# Patient Record
Sex: FEMALE | Race: WHITE | NOT HISPANIC OR LATINO | Employment: OTHER | URBAN - METROPOLITAN AREA
[De-identification: names, ages, dates, MRNs, and addresses within clinical notes are randomized per-mention and may not be internally consistent; named-entity substitution may affect disease eponyms.]

---

## 2017-08-08 ENCOUNTER — OFFICE VISIT (OUTPATIENT)
Dept: INTERNAL MEDICINE | Facility: CLINIC | Age: 72
End: 2017-08-08

## 2017-08-08 VITALS
HEIGHT: 66 IN | SYSTOLIC BLOOD PRESSURE: 169 MMHG | WEIGHT: 154 LBS | DIASTOLIC BLOOD PRESSURE: 76 MMHG | HEART RATE: 85 BPM | BODY MASS INDEX: 24.75 KG/M2 | OXYGEN SATURATION: 97 % | TEMPERATURE: 98.3 F

## 2017-08-08 DIAGNOSIS — K63.5 MULTIPLE POLYPS OF SIGMOID COLON: ICD-10-CM

## 2017-08-08 DIAGNOSIS — Z00.00 MEDICARE ANNUAL WELLNESS VISIT, SUBSEQUENT: Primary | ICD-10-CM

## 2017-08-08 DIAGNOSIS — I10 ESSENTIAL HYPERTENSION: ICD-10-CM

## 2017-08-08 DIAGNOSIS — Z12.31 VISIT FOR SCREENING MAMMOGRAM: ICD-10-CM

## 2017-08-08 PROCEDURE — 99203 OFFICE O/P NEW LOW 30 MIN: CPT | Performed by: INTERNAL MEDICINE

## 2017-08-08 PROCEDURE — G0439 PPPS, SUBSEQ VISIT: HCPCS | Performed by: INTERNAL MEDICINE

## 2017-08-08 RX ORDER — LATANOPROST 50 UG/ML
SOLUTION/ DROPS OPHTHALMIC
Refills: 6 | COMMUNITY
Start: 2017-07-31

## 2017-08-08 RX ORDER — DORZOLAMIDE HYDROCHLORIDE AND TIMOLOL MALEATE 20; 5 MG/ML; MG/ML
SOLUTION/ DROPS OPHTHALMIC
Refills: 6 | COMMUNITY
Start: 2017-07-31

## 2017-08-08 RX ORDER — LOSARTAN POTASSIUM 100 MG/1
TABLET ORAL
Refills: 2 | COMMUNITY
Start: 2017-06-19 | End: 2017-09-19 | Stop reason: SDUPTHER

## 2017-08-08 NOTE — PROGRESS NOTES
QUICK REFERENCE INFORMATION:  The ABCs of the Annual Wellness Visit    Subsequent Medicare Wellness Visit    HEALTH RISK ASSESSMENT    1945    Recent Hospitalizations:  No hospitalization(s) within the last year..        Current Medical Providers:  Patient Care Team:  Tomy Guido MD as PCP - General (Internal Medicine)        Smoking Status:  History   Smoking Status   • Former Smoker   Smokeless Tobacco   • Not on file       Alcohol Consumption:  History   Alcohol Use   • Yes       Depression Screen:   PHQ-9 Depression Screening 8/8/2017   Little interest or pleasure in doing things 0   Feeling down, depressed, or hopeless 0   PHQ-9 Total Score 0       Health Habits and Functional and Cognitive Screening:  No flowsheet data found.           Does the patient have evidence of cognitive impairment? No    Aspirin use counseling: Start ASA 81 mg daily       Recent Lab Results:  CMP:     Lipid Panel:     HbA1c:       Visual Acuity:  No exam data present    Age-appropriate Screening Schedule:  Refer to the list below for future screening recommendations based on patient's age, sex and/or medical conditions. Orders for these recommended tests are listed in the plan section. The patient has been provided with a written plan.    Health Maintenance   Topic Date Due   • TDAP/TD VACCINES (1 - Tdap) [birth year]/29/1964   • ZOSTER VACCINE  08/08/2017   • INFLUENZA VACCINE  09/01/2017   • MAMMOGRAM  04/13/2018   • PNEUMOCOCCAL VACCINES (65+ LOW/MEDIUM RISK) (2 of 2 - PPSV23) 08/08/2018   • COLONOSCOPY  09/09/2026        Subjective   History of Present Illness    Kamilla Hawthorne is a 71 y.o. female who presents for an Subsequent Wellness Visit.    The following portions of the patient's history were reviewed and updated as appropriate: allergies, current medications, past family history, past medical history, past social history, past surgical history and problem list.    No outpatient prescriptions prior to visit.     No  "facility-administered medications prior to visit.        Patient Active Problem List   Diagnosis   • Medicare annual wellness visit, subsequent   • Essential hypertension   • Multiple polyps of sigmoid colon       Advance Care Planning:  has an advance directive - a copy has been provided and is in file    Identification of Risk Factors:  Risk factors include: none.    Review of Systems    Compared to one year ago, the patient feels her physical health is the same.  Compared to one year ago, the patient feels her mental health is the same.    Objective     Physical Exam    Vitals:    08/08/17 1318   BP: 169/76   BP Location: Left arm   Patient Position: Sitting   Cuff Size: Adult   Pulse: 85   Temp: 98.3 °F (36.8 °C)   TempSrc: Tympanic   SpO2: 97%   Weight: 154 lb (69.9 kg)   Height: 66\" (167.6 cm)       Body mass index is 24.86 kg/(m^2).  Discussed the patient's BMI with her. The BMI is in the acceptable range.    Assessment/Plan   Patient Self-Management and Personalized Health Advice  The patient has been provided with information about: none and preventive services including:   · no new recs.    Visit Diagnoses:    ICD-10-CM ICD-9-CM   1. Medicare annual wellness visit, subsequent Z00.00 V70.0   2. Essential hypertension I10 401.9       No orders of the defined types were placed in this encounter.      Outpatient Encounter Prescriptions as of 8/8/2017   Medication Sig Dispense Refill   • dorzolamide-timolol (COSOPT) 22.3-6.8 MG/ML ophthalmic solution INSTILL 1 DROP BY OPHTHALMIC ROUTE 2 TIMES EVERY DAY INTO BOTH EYES  6   • latanoprost (XALATAN) 0.005 % ophthalmic solution INSTILL 1 DROP BY OPHTHALMIC ROUTE EVERY DAY BOTH EYES AT BEDTIME  6   • losartan (COZAAR) 100 MG tablet TAKE 1 TABLET BY MOUTH DAILY  2     No facility-administered encounter medications on file as of 8/8/2017.        Reviewed use of high risk medication in the elderly: yes  Reviewed for potential of harmful drug interactions in the elderly: " yes    Follow Up:  No Follow-up on file.     An After Visit Summary and PPPS with all of these plans were given to the patient.

## 2017-08-08 NOTE — PATIENT INSTRUCTIONS
Medicare Wellness  Personal Prevention Plan of Service     Date of Office Visit:  2017  Encounter Provider:  Tomy Guido MD  Place of Service:  [facility]  Patient Name: Kamilla Hawthorne  :  1945    As part of the Medicare Wellness portion of your visit today, we are providing you with this personalized preventive plan of services (PPPS). This plan is based upon recommendations of the [place] Preventive Services Task Force (USPSTF) and the Advisory Committee on Immunization Practices (ACIP).    This lists the preventive care services that should be considered, and provides dates of when you are due. Items listed as completed are up-to-date and do not require any further intervention.    Health Maintenance   Topic Date Due   • TDAP/TD VACCINES (1 - Tdap) 1964   • ZOSTER VACCINE  2017   • INFLUENZA VACCINE  2017   • MAMMOGRAM  2018   • PNEUMOCOCCAL VACCINES (65+ LOW/MEDIUM RISK) (2 of 2 - PPSV23) 2018   • COLONOSCOPY  2026   • HEPATITIS C SCREENING  Addressed       No orders of the defined types were placed in this encounter.      Return in about 1 year (around 2018) for Annual physical.

## 2017-08-09 LAB
ALBUMIN SERPL-MCNC: 4.2 G/DL (ref 3.5–5.2)
ALBUMIN/GLOB SERPL: 1.4 G/DL
ALP SERPL-CCNC: 69 U/L (ref 39–117)
ALT SERPL-CCNC: 16 U/L (ref 1–33)
APPEARANCE UR: CLEAR
AST SERPL-CCNC: 20 U/L (ref 1–32)
BACTERIA #/AREA URNS HPF: NORMAL /HPF
BASOPHILS # BLD AUTO: 0.01 10*3/MM3 (ref 0–0.2)
BASOPHILS NFR BLD AUTO: 0.2 % (ref 0–1.5)
BILIRUB SERPL-MCNC: 1.1 MG/DL (ref 0.1–1.2)
BILIRUB UR QL STRIP: NEGATIVE
BUN SERPL-MCNC: 16 MG/DL (ref 8–23)
BUN/CREAT SERPL: 27.6 (ref 7–25)
CALCIUM SERPL-MCNC: 9.8 MG/DL (ref 8.6–10.5)
CASTS URNS MICRO: NORMAL
CHLORIDE SERPL-SCNC: 102 MMOL/L (ref 98–107)
CHOLEST SERPL-MCNC: 247 MG/DL (ref 0–200)
CO2 SERPL-SCNC: 28.3 MMOL/L (ref 22–29)
COLOR UR: YELLOW
CREAT SERPL-MCNC: 0.58 MG/DL (ref 0.57–1)
EOSINOPHIL # BLD AUTO: 0.06 10*3/MM3 (ref 0–0.7)
EOSINOPHIL NFR BLD AUTO: 1.2 % (ref 0.3–6.2)
EPI CELLS #/AREA URNS HPF: NORMAL /HPF
ERYTHROCYTE [DISTWIDTH] IN BLOOD BY AUTOMATED COUNT: 14 % (ref 11.7–13)
GLOBULIN SER CALC-MCNC: 3.1 GM/DL
GLUCOSE SERPL-MCNC: 99 MG/DL (ref 65–99)
GLUCOSE UR QL: NEGATIVE
HCT VFR BLD AUTO: 46.3 % (ref 35.6–45.5)
HDLC SERPL-MCNC: 100 MG/DL (ref 40–60)
HGB BLD-MCNC: 15 G/DL (ref 11.9–15.5)
HGB UR QL STRIP: NEGATIVE
IMM GRANULOCYTES # BLD: 0 10*3/MM3 (ref 0–0.03)
IMM GRANULOCYTES NFR BLD: 0 % (ref 0–0.5)
KETONES UR QL STRIP: NEGATIVE
LDLC SERPL CALC-MCNC: 130 MG/DL (ref 0–100)
LDLC/HDLC SERPL: 1.3 {RATIO}
LEUKOCYTE ESTERASE UR QL STRIP: (no result)
LYMPHOCYTES # BLD AUTO: 2.16 10*3/MM3 (ref 0.9–4.8)
LYMPHOCYTES NFR BLD AUTO: 44.4 % (ref 19.6–45.3)
MCH RBC QN AUTO: 33.3 PG (ref 26.9–32)
MCHC RBC AUTO-ENTMCNC: 32.4 G/DL (ref 32.4–36.3)
MCV RBC AUTO: 102.7 FL (ref 80.5–98.2)
MONOCYTES # BLD AUTO: 0.38 10*3/MM3 (ref 0.2–1.2)
MONOCYTES NFR BLD AUTO: 7.8 % (ref 5–12)
NEUTROPHILS # BLD AUTO: 2.25 10*3/MM3 (ref 1.9–8.1)
NEUTROPHILS NFR BLD AUTO: 46.4 % (ref 42.7–76)
NITRITE UR QL STRIP: NEGATIVE
PH UR STRIP: 7 [PH] (ref 5–8)
PLATELET # BLD AUTO: 216 10*3/MM3 (ref 140–500)
POTASSIUM SERPL-SCNC: 4.5 MMOL/L (ref 3.5–5.2)
PROT SERPL-MCNC: 7.3 G/DL (ref 6–8.5)
PROT UR QL STRIP: NEGATIVE
RBC # BLD AUTO: 4.51 10*6/MM3 (ref 3.9–5.2)
RBC #/AREA URNS HPF: NORMAL /HPF
SODIUM SERPL-SCNC: 142 MMOL/L (ref 136–145)
SP GR UR: 1.01 (ref 1–1.03)
T4 FREE SERPL-MCNC: 1 NG/DL (ref 0.93–1.7)
TRIGL SERPL-MCNC: 84 MG/DL (ref 0–150)
TSH SERPL DL<=0.005 MIU/L-ACNC: 1.53 MIU/ML (ref 0.27–4.2)
UROBILINOGEN UR STRIP-MCNC: (no result) MG/DL
VLDLC SERPL CALC-MCNC: 16.8 MG/DL (ref 5–40)
WBC # BLD AUTO: 4.86 10*3/MM3 (ref 4.5–10.7)
WBC #/AREA URNS HPF: NORMAL /HPF

## 2017-08-20 NOTE — PROGRESS NOTES
Subjective   Kamilla Hawthorne is a 71 y.o. female.   She is here today for Medicare annual visit subsequent along with hypertension multiple sigmoid colon polyps visit for screening mammogram and she has no new complaints  History of Present Illness   She is here today for Medicare annual visit subsequent along with hypertension multiple sigmoid colon polyps and visit for screening mammogram and she has no new complaints  The following portions of the patient's history were reviewed and updated as appropriate: allergies, current medications, past family history, past medical history, past social history, past surgical history and problem list.    Review of Systems   All other systems reviewed and are negative.      Objective   Physical Exam   Constitutional: She is oriented to person, place, and time. Vital signs are normal. She appears well-developed and well-nourished. She is active.   HENT:   Head: Normocephalic and atraumatic.   Right Ear: Hearing, tympanic membrane, external ear and ear canal normal.   Left Ear: Hearing, tympanic membrane, external ear and ear canal normal.   Nose: Nose normal.   Mouth/Throat: Uvula is midline, oropharynx is clear and moist and mucous membranes are normal.   Eyes: Conjunctivae, EOM and lids are normal. Pupils are equal, round, and reactive to light. Right eye exhibits no discharge. Left eye exhibits no discharge.   Neck: Trachea normal, normal range of motion, full passive range of motion without pain and phonation normal. Neck supple. Carotid bruit is not present. No edema present. No thyroid mass and no thyromegaly present.   Cardiovascular: Normal rate, regular rhythm, normal heart sounds, intact distal pulses and normal pulses.  Exam reveals no gallop and no friction rub.    No murmur heard.  Pulmonary/Chest: Effort normal and breath sounds normal. No respiratory distress. She has no wheezes. She has no rales. Right breast exhibits no inverted nipple, no mass, no nipple  discharge, no skin change and no tenderness. Left breast exhibits no inverted nipple, no mass, no nipple discharge, no skin change and no tenderness. Breasts are symmetrical. There is no breast swelling.   Abdominal: Soft. Normal appearance, normal aorta and bowel sounds are normal. She exhibits no distension, no abdominal bruit and no mass. There is no hepatosplenomegaly. There is no tenderness. There is no rebound, no guarding and no CVA tenderness. No hernia. Hernia confirmed negative in the right inguinal area and confirmed negative in the left inguinal area.   Genitourinary: No breast tenderness, discharge or bleeding.   Musculoskeletal: Normal range of motion. She exhibits no edema or tenderness.       Vascular Status -  Her exam exhibits right foot vasculature normal. Her exam exhibits no right foot edema. Her exam exhibits left foot vasculature normal. Her exam exhibits no left foot edema.   Skin Integrity  -  Her right foot skin is intact.     Kamilla 's left foot skin is intact. .  Lymphadenopathy:     She has no cervical adenopathy.     She has no axillary adenopathy.        Right: No inguinal and no supraclavicular adenopathy present.        Left: No inguinal and no supraclavicular adenopathy present.   Neurological: She is alert and oriented to person, place, and time. She has normal strength. No cranial nerve deficit or sensory deficit. She exhibits normal muscle tone. She displays a negative Romberg sign. Coordination normal.   Skin: Skin is warm, dry and intact. No cyanosis. Nails show no clubbing.   Psychiatric: She has a normal mood and affect. Her speech is normal and behavior is normal. Judgment and thought content normal. Cognition and memory are normal.   Nursing note and vitals reviewed.      Assessment/Plan   Diagnoses and all orders for this visit:    Medicare annual wellness visit, subsequent  -     Lipid Panel With LDL / HDL Ratio  -     CBC & Differential  -     Comprehensive Metabolic  Panel  -     T4, Free  -     TSH  -     Urinalysis With Microscopic  -     Mammo Screening Bilateral With CAD; Future    Essential hypertension  -     Lipid Panel With LDL / HDL Ratio  -     CBC & Differential  -     Comprehensive Metabolic Panel  -     T4, Free  -     TSH  -     Urinalysis With Microscopic  -     Mammo Screening Bilateral With CAD; Future    Multiple polyps of sigmoid colon  -     Lipid Panel With LDL / HDL Ratio  -     CBC & Differential  -     Comprehensive Metabolic Panel  -     T4, Free  -     TSH  -     Urinalysis With Microscopic  -     Mammo Screening Bilateral With CAD; Future    Visit for screening mammogram  -     Mammo Screening Bilateral With CAD; Future    Other orders  -     Microscopic Examination      Medicare annual wellness visit subsequent follow recommendation  Hypertension well-controlled on current medication normally  Multiple polyps of sigmoid colon noted  This for screening mammogram schedule

## 2017-08-28 ENCOUNTER — HOSPITAL ENCOUNTER (OUTPATIENT)
Dept: MAMMOGRAPHY | Facility: HOSPITAL | Age: 72
Discharge: HOME | End: 2017-08-28
Attending: INTERNAL MEDICINE | Admitting: INTERNAL MEDICINE

## 2017-08-28 DIAGNOSIS — Z00.00 MEDICARE ANNUAL WELLNESS VISIT, SUBSEQUENT: ICD-10-CM

## 2017-08-28 DIAGNOSIS — Z12.31 VISIT FOR SCREENING MAMMOGRAM: ICD-10-CM

## 2017-08-28 DIAGNOSIS — I10 ESSENTIAL HYPERTENSION: ICD-10-CM

## 2017-08-28 DIAGNOSIS — K63.5 MULTIPLE POLYPS OF SIGMOID COLON: ICD-10-CM

## 2017-08-28 PROCEDURE — G0202 SCR MAMMO BI INCL CAD: HCPCS

## 2017-09-07 ENCOUNTER — TELEPHONE (OUTPATIENT)
Dept: INTERNAL MEDICINE | Facility: CLINIC | Age: 72
End: 2017-09-07

## 2017-09-07 ENCOUNTER — TRANSCRIBE ORDERS (OUTPATIENT)
Dept: ADMINISTRATIVE | Facility: HOSPITAL | Age: 72
End: 2017-09-07

## 2017-09-07 DIAGNOSIS — R92.8 ABNORMAL MAMMOGRAM: Primary | ICD-10-CM

## 2017-09-14 ENCOUNTER — HOSPITAL ENCOUNTER (OUTPATIENT)
Dept: MAMMOGRAPHY | Facility: HOSPITAL | Age: 72
Discharge: HOME | End: 2017-09-14
Attending: INTERNAL MEDICINE | Admitting: INTERNAL MEDICINE

## 2017-09-14 DIAGNOSIS — R92.8 ABNORMAL MAMMOGRAM: ICD-10-CM

## 2017-09-14 PROCEDURE — G0206 DX MAMMO INCL CAD UNI: HCPCS

## 2017-09-19 RX ORDER — LOSARTAN POTASSIUM 100 MG/1
TABLET ORAL
Qty: 90 TABLET | Refills: 2 | Status: SHIPPED | OUTPATIENT
Start: 2017-09-19 | End: 2018-08-08 | Stop reason: SDUPTHER

## 2018-08-08 RX ORDER — LOSARTAN POTASSIUM 100 MG/1
100 TABLET ORAL DAILY
Qty: 90 TABLET | Refills: 2 | Status: SHIPPED | OUTPATIENT
Start: 2018-08-08 | End: 2018-09-14

## 2018-09-14 ENCOUNTER — OFFICE VISIT (OUTPATIENT)
Dept: INTERNAL MEDICINE | Facility: CLINIC | Age: 73
End: 2018-09-14

## 2018-09-14 VITALS
OXYGEN SATURATION: 98 % | BODY MASS INDEX: 23.78 KG/M2 | TEMPERATURE: 98.1 F | DIASTOLIC BLOOD PRESSURE: 89 MMHG | WEIGHT: 148 LBS | HEIGHT: 66 IN | SYSTOLIC BLOOD PRESSURE: 159 MMHG | RESPIRATION RATE: 16 BRPM | HEART RATE: 60 BPM

## 2018-09-14 DIAGNOSIS — I10 ESSENTIAL HYPERTENSION: Primary | ICD-10-CM

## 2018-09-14 PROCEDURE — 90732 PPSV23 VACC 2 YRS+ SUBQ/IM: CPT | Performed by: INTERNAL MEDICINE

## 2018-09-14 PROCEDURE — 99212 OFFICE O/P EST SF 10 MIN: CPT | Performed by: INTERNAL MEDICINE

## 2018-09-14 PROCEDURE — G0009 ADMIN PNEUMOCOCCAL VACCINE: HCPCS | Performed by: INTERNAL MEDICINE

## 2018-09-14 RX ORDER — LOSARTAN POTASSIUM AND HYDROCHLOROTHIAZIDE 25; 100 MG/1; MG/1
1 TABLET ORAL DAILY
Qty: 90 TABLET | Refills: 3 | Status: SHIPPED | OUTPATIENT
Start: 2018-09-14 | End: 2019-05-09

## 2018-09-15 LAB
ALBUMIN SERPL-MCNC: 4.7 G/DL (ref 3.5–5.2)
ALBUMIN/GLOB SERPL: 2 G/DL
ALP SERPL-CCNC: 77 U/L (ref 39–117)
ALT SERPL-CCNC: 15 U/L (ref 1–33)
APPEARANCE UR: CLEAR
AST SERPL-CCNC: 13 U/L (ref 1–32)
BACTERIA #/AREA URNS HPF: NORMAL /HPF
BASOPHILS # BLD AUTO: 0.04 10*3/MM3 (ref 0–0.2)
BASOPHILS NFR BLD AUTO: 0.9 % (ref 0–1.5)
BILIRUB SERPL-MCNC: 0.9 MG/DL (ref 0.1–1.2)
BILIRUB UR QL STRIP: NEGATIVE
BUN SERPL-MCNC: 17 MG/DL (ref 8–23)
BUN/CREAT SERPL: 32.1 (ref 7–25)
CALCIUM SERPL-MCNC: 9.4 MG/DL (ref 8.6–10.5)
CASTS URNS MICRO: NORMAL
CHLORIDE SERPL-SCNC: 106 MMOL/L (ref 98–107)
CHOLEST SERPL-MCNC: 225 MG/DL (ref 0–200)
CO2 SERPL-SCNC: 22.8 MMOL/L (ref 22–29)
COLOR UR: YELLOW
CREAT SERPL-MCNC: 0.53 MG/DL (ref 0.57–1)
EOSINOPHIL # BLD AUTO: 0.07 10*3/MM3 (ref 0–0.7)
EOSINOPHIL NFR BLD AUTO: 1.7 % (ref 0.3–6.2)
EPI CELLS #/AREA URNS HPF: NORMAL /HPF
ERYTHROCYTE [DISTWIDTH] IN BLOOD BY AUTOMATED COUNT: 14.1 % (ref 11.7–13)
GLOBULIN SER CALC-MCNC: 2.3 GM/DL
GLUCOSE SERPL-MCNC: 75 MG/DL (ref 65–99)
GLUCOSE UR QL: NEGATIVE
HCT VFR BLD AUTO: 46.5 % (ref 35.6–45.5)
HDLC SERPL-MCNC: 95 MG/DL (ref 40–60)
HGB BLD-MCNC: 15.1 G/DL (ref 11.9–15.5)
HGB UR QL STRIP: NEGATIVE
IMM GRANULOCYTES # BLD: 0 10*3/MM3 (ref 0–0.03)
IMM GRANULOCYTES NFR BLD: 0 % (ref 0–0.5)
KETONES UR QL STRIP: NEGATIVE
LDLC SERPL CALC-MCNC: 118 MG/DL (ref 0–100)
LDLC/HDLC SERPL: 1.25 {RATIO}
LEUKOCYTE ESTERASE UR QL STRIP: NEGATIVE
LYMPHOCYTES # BLD AUTO: 1.74 10*3/MM3 (ref 0.9–4.8)
LYMPHOCYTES NFR BLD AUTO: 41 % (ref 19.6–45.3)
MCH RBC QN AUTO: 32.3 PG (ref 26.9–32)
MCHC RBC AUTO-ENTMCNC: 32.5 G/DL (ref 32.4–36.3)
MCV RBC AUTO: 99.4 FL (ref 80.5–98.2)
MONOCYTES # BLD AUTO: 0.32 10*3/MM3 (ref 0.2–1.2)
MONOCYTES NFR BLD AUTO: 7.5 % (ref 5–12)
NEUTROPHILS # BLD AUTO: 2.07 10*3/MM3 (ref 1.9–8.1)
NEUTROPHILS NFR BLD AUTO: 48.9 % (ref 42.7–76)
NITRITE UR QL STRIP: NEGATIVE
PH UR STRIP: 6.5 [PH] (ref 5–8)
PLATELET # BLD AUTO: 239 10*3/MM3 (ref 140–500)
POTASSIUM SERPL-SCNC: 4.5 MMOL/L (ref 3.5–5.2)
PROT SERPL-MCNC: 7 G/DL (ref 6–8.5)
PROT UR QL STRIP: NEGATIVE
RBC # BLD AUTO: 4.68 10*6/MM3 (ref 3.9–5.2)
RBC #/AREA URNS HPF: NORMAL /HPF
SODIUM SERPL-SCNC: 145 MMOL/L (ref 136–145)
SP GR UR: 1.02 (ref 1–1.03)
T4 FREE SERPL-MCNC: 1.11 NG/DL (ref 0.93–1.7)
TRIGL SERPL-MCNC: 58 MG/DL (ref 0–150)
TSH SERPL DL<=0.005 MIU/L-ACNC: 1.28 MIU/ML (ref 0.27–4.2)
UROBILINOGEN UR STRIP-MCNC: (no result) MG/DL
VLDLC SERPL CALC-MCNC: 11.6 MG/DL (ref 5–40)
WBC # BLD AUTO: 4.24 10*3/MM3 (ref 4.5–10.7)
WBC #/AREA URNS HPF: NORMAL /HPF

## 2018-10-02 NOTE — PROGRESS NOTES
Subjective   Kamilla Hawthorne is a 72 y.o. female.   She is here today for hypertension and she has no complaints  History of Present Illness   She is here today for hypertension which has been fairly stable but we will make changes to medication today  The following portions of the patient's history were reviewed and updated as appropriate: allergies, current medications, past family history, past medical history, past social history, past surgical history and problem list.    Review of Systems   All other systems reviewed and are negative.      Objective   Physical Exam   Constitutional: She is oriented to person, place, and time. She appears well-developed and well-nourished. She is cooperative.   HENT:   Head: Normocephalic and atraumatic.   Right Ear: Hearing, tympanic membrane, external ear and ear canal normal.   Left Ear: Hearing, tympanic membrane, external ear and ear canal normal.   Nose: Nose normal.   Mouth/Throat: Uvula is midline, oropharynx is clear and moist and mucous membranes are normal.   Eyes: Pupils are equal, round, and reactive to light. Conjunctivae, EOM and lids are normal.   Neck: Phonation normal. Neck supple. Carotid bruit is not present.   Cardiovascular: Normal rate, regular rhythm and normal heart sounds.  Exam reveals no gallop and no friction rub.    No murmur heard.  Pulmonary/Chest: Effort normal and breath sounds normal. No respiratory distress.   Abdominal: Soft. Bowel sounds are normal. She exhibits no distension and no mass. There is no hepatosplenomegaly. There is no tenderness. There is no rebound and no guarding. No hernia.   Musculoskeletal: She exhibits no edema.   Neurological: She is alert and oriented to person, place, and time. Coordination and gait normal.   Skin: Skin is warm and dry.   Psychiatric: She has a normal mood and affect. Her speech is normal and behavior is normal. Judgment and thought content normal.   Nursing note and vitals  reviewed.      Assessment/Plan   Diagnoses and all orders for this visit:    Essential hypertension  -     Lipid Panel With LDL / HDL Ratio  -     CBC & Differential  -     Comprehensive Metabolic Panel  -     T4, Free  -     TSH  -     Urinalysis With Microscopic - Urine, Clean Catch    Other orders  -     losartan-hydrochlorothiazide (HYZAAR) 100-25 MG per tablet; Take 1 tablet by mouth Daily.  -     Pneumococcal Polysaccharide Vaccine 23-Valent Greater Than or Equal To 1yo Subcutaneous / IM  -     Microscopic Examination      Hypertension well-controlled on current medication we will check labs based on that as well

## 2018-10-28 ENCOUNTER — PREP FOR SURGERY (OUTPATIENT)
Dept: OTHER | Facility: HOSPITAL | Age: 73
End: 2018-10-28

## 2018-10-28 DIAGNOSIS — K63.5 COLON POLYP: Primary | ICD-10-CM

## 2018-10-28 DIAGNOSIS — Z80.0 FH: COLON CANCER: ICD-10-CM

## 2018-11-09 PROBLEM — Z80.0 FH: COLON CANCER: Status: ACTIVE | Noted: 2018-11-09

## 2018-11-09 PROBLEM — K63.5 COLON POLYP: Status: ACTIVE | Noted: 2018-11-09

## 2018-12-29 ENCOUNTER — OFFICE VISIT (OUTPATIENT)
Dept: RETAIL CLINIC | Facility: CLINIC | Age: 73
End: 2018-12-29

## 2018-12-29 VITALS
TEMPERATURE: 98.5 F | DIASTOLIC BLOOD PRESSURE: 82 MMHG | SYSTOLIC BLOOD PRESSURE: 158 MMHG | HEART RATE: 65 BPM | RESPIRATION RATE: 16 BRPM | OXYGEN SATURATION: 98 %

## 2018-12-29 DIAGNOSIS — H10.022 PINK EYE DISEASE OF LEFT EYE: ICD-10-CM

## 2018-12-29 DIAGNOSIS — H65.01 RIGHT ACUTE SEROUS OTITIS MEDIA, RECURRENCE NOT SPECIFIED: Primary | ICD-10-CM

## 2018-12-29 PROCEDURE — 99213 OFFICE O/P EST LOW 20 MIN: CPT | Performed by: NURSE PRACTITIONER

## 2018-12-29 RX ORDER — CEFDINIR 300 MG/1
300 CAPSULE ORAL 2 TIMES DAILY
Qty: 20 CAPSULE | Refills: 0 | Status: SHIPPED | OUTPATIENT
Start: 2018-12-29 | End: 2019-01-08

## 2018-12-29 NOTE — PROGRESS NOTES
"[facility]    CC:   Chief Complaint   Patient presents with   • URI     HPI   [place] then cold symptoms since then. Now, bilateral ear pain with ears stopped up, cannot hear  50 years ago ear tubes  Taking coridin hbp  Left eye x 1 day with redness, used 's eye drops (recent infection), used grandson's eye drops  No fever/chills  Occasional sore throat  \"Head going to explode\"    Review of Systems: Please see the above history of present illness for pertinent positives and negatives. The remainder of the patient's systems have been reviewed and are negative.     Past Medical History:   Diagnosis Date   • Colon polyp    • Glaucoma    • Hypertension        History reviewed. No pertinent surgical history.    Social History     Tobacco Use   • Smoking status: Former Smoker     Types: Cigarettes   • Smokeless tobacco: Never Used   Substance Use Topics   • Alcohol use: Yes   • Drug use: Not on file     Current Outpatient Medications:   •  dorzolamide-timolol (COSOPT) 22.3-6.8 MG/ML ophthalmic solution, INSTILL 1 DROP BY OPHTHALMIC ROUTE 2 TIMES EVERY DAY INTO BOTH EYES, Disp: , Rfl: 6  •  latanoprost (XALATAN) 0.005 % ophthalmic solution, INSTILL 1 DROP BY OPHTHALMIC ROUTE EVERY DAY BOTH EYES AT BEDTIME, Disp: , Rfl: 6  •  losartan-hydrochlorothiazide (HYZAAR) 100-25 MG per tablet, Take 1 tablet by mouth Daily., Disp: 90 tablet, Rfl: 3    Allergies   Allergen Reactions   • Penicillins      OBJECTIVE:    /82   Pulse 65   Temp 98.5 °F (36.9 °C) (Oral)   Resp 16   LMP  (LMP Unknown)   SpO2 98%    Right eye 20/40  Left eye 20/40  Both eyes 20/30    Lab Results (last 24 hours)     ** No results found for the last 24 hours. **          General Appearance:    Alert, cooperative, no distress, appears stated age   Head:    Normocephalic, without obvious abnormality, atraumatic   Eyes:    PERRL, left conjunctival erythema with purulent drainage/corneas clear, EOM's intact, fundi     " benign, both eyes   Ears:    Right TM bulging erythematous/edematous with possible perforation noted, fluid noted in canal.    Nose:   Nares normal, septum midline, mucosa normal, no drainage     or sinus tenderness   Throat:   Lips, mucosa, and tongue normal; teeth and gums normal  Tonsils without erythema or exudates.   Neck:   Supple, symmetrical, trachea midline, no adenopathy;            Lungs:     Clear to auscultation bilaterally, respirations unlabored   Chest Wall:    No tenderness or deformity    Heart:    Regular rate and rhythm, S1 and S2 normal, no murmur, rub    or gallop                                       ASSESSMENT/PLAN    1. Right acute serous otitis media, recurrence not specified    - cefdinir (OMNICEF) 300 MG capsule; Take 1 capsule by mouth 2 (Two) Times a Day for 10 days.  Dispense: 20 capsule; Refill: 0    2. Pink eye disease of left eye    - tobramycin in sterile water (preservative free) injection-balanced salts ophthalmic solution; Apply 1-2 drops to eye(s) as directed by provider Every 4 (Four) Hours for 7 days.  Dispense: 5 mL; Refill: 0    Kamilla Hawthorne had no medications administered during this visit.      AVS and information sheet given to patient, discussed in detail, questions answered

## 2019-01-03 ENCOUNTER — OFFICE VISIT (OUTPATIENT)
Dept: INTERNAL MEDICINE | Facility: CLINIC | Age: 74
End: 2019-01-03

## 2019-01-03 VITALS
DIASTOLIC BLOOD PRESSURE: 76 MMHG | OXYGEN SATURATION: 98 % | HEIGHT: 66 IN | HEART RATE: 74 BPM | TEMPERATURE: 97.9 F | SYSTOLIC BLOOD PRESSURE: 156 MMHG | WEIGHT: 151 LBS | RESPIRATION RATE: 17 BRPM | BODY MASS INDEX: 24.27 KG/M2

## 2019-01-03 DIAGNOSIS — H10.022 PINK EYE, LEFT: ICD-10-CM

## 2019-01-03 DIAGNOSIS — R05.9 COUGH: ICD-10-CM

## 2019-01-03 DIAGNOSIS — I10 ESSENTIAL HYPERTENSION: ICD-10-CM

## 2019-01-03 DIAGNOSIS — H92.01 RIGHT EAR PAIN: ICD-10-CM

## 2019-01-03 DIAGNOSIS — J06.9 UPPER RESPIRATORY TRACT INFECTION, UNSPECIFIED TYPE: Primary | ICD-10-CM

## 2019-01-03 DIAGNOSIS — H65.111 SUBACUTE ALLERGIC OTITIS MEDIA OF RIGHT EAR, RECURRENCE NOT SPECIFIED: ICD-10-CM

## 2019-01-03 PROBLEM — H66.90 SUBACUTE OTITIS MEDIA: Status: ACTIVE | Noted: 2019-01-03

## 2019-01-03 PROCEDURE — 96373 THER/PROPH/DIAG INJ IA: CPT | Performed by: INTERNAL MEDICINE

## 2019-01-03 PROCEDURE — 99214 OFFICE O/P EST MOD 30 MIN: CPT | Performed by: INTERNAL MEDICINE

## 2019-01-03 RX ORDER — TRIAMCINOLONE ACETONIDE 40 MG/ML
40 INJECTION, SUSPENSION INTRA-ARTICULAR; INTRAMUSCULAR ONCE
Status: COMPLETED | OUTPATIENT
Start: 2019-01-03 | End: 2019-01-03

## 2019-01-03 RX ORDER — METHYLPREDNISOLONE ACETATE 80 MG/ML
80 INJECTION, SUSPENSION INTRA-ARTICULAR; INTRALESIONAL; INTRAMUSCULAR; SOFT TISSUE ONCE
Status: COMPLETED | OUTPATIENT
Start: 2019-01-03 | End: 2019-01-03

## 2019-01-03 RX ORDER — DOXYCYCLINE HYCLATE 100 MG
100 TABLET ORAL 2 TIMES DAILY
Qty: 20 TABLET | Refills: 0 | Status: SHIPPED | OUTPATIENT
Start: 2019-01-03 | End: 2019-11-25

## 2019-01-03 RX ORDER — TOBRAMYCIN 3 MG/ML
SOLUTION/ DROPS OPHTHALMIC
Refills: 8 | COMMUNITY
Start: 2018-12-30 | End: 2019-11-25

## 2019-01-03 RX ADMIN — METHYLPREDNISOLONE ACETATE 80 MG: 80 INJECTION, SUSPENSION INTRA-ARTICULAR; INTRALESIONAL; INTRAMUSCULAR; SOFT TISSUE at 09:47

## 2019-01-03 RX ADMIN — TRIAMCINOLONE ACETONIDE 40 MG: 40 INJECTION, SUSPENSION INTRA-ARTICULAR; INTRAMUSCULAR at 09:48

## 2019-01-20 NOTE — PROGRESS NOTES
Subjective   Kamilla Hawthorne is a 73 y.o. female.   She is here today for upper respiratory tract infection along with right ear pain hypertension pink eye on The left cough and subacute allergic otitis media of the right ear  History of Present Illness   She is here today for upper respiratory tract infection which is not stable and getting worse along with pinkeye on the left cough which is getting worse chronic allergic otitis media of the right ear which is getting worse hypertension which is stable on current medication  The following portions of the patient's history were reviewed and updated as appropriate: allergies, current medications, past family history, past medical history, past social history, past surgical history and problem list.    Review of Systems   Constitutional: Negative for fatigue.   HENT: Positive for congestion and ear pain.    Eyes: Positive for discharge (left eye).   Respiratory: Positive for cough. Negative for shortness of breath.    Cardiovascular: Negative for chest pain.   Genitourinary: Negative for difficulty urinating.   Neurological: Negative for weakness.   All other systems reviewed and are negative.      Objective   Physical Exam   Constitutional: She is oriented to person, place, and time. She appears well-developed and well-nourished. She is cooperative.   HENT:   Head: Normocephalic and atraumatic.   Right Ear: Hearing, external ear and ear canal normal. Tympanic membrane is erythematous.   Left Ear: Hearing, tympanic membrane, external ear and ear canal normal.   Nose: Nose normal.   Mouth/Throat: Uvula is midline, oropharynx is clear and moist and mucous membranes are normal.   Eyes: Conjunctivae, EOM and lids are normal. Pupils are equal, round, and reactive to light. Left eye exhibits discharge.   Neck: Phonation normal. Neck supple. Carotid bruit is not present.   Cardiovascular: Normal rate, regular rhythm and normal heart sounds. Exam reveals no gallop and no  friction rub.   No murmur heard.  Pulmonary/Chest: Effort normal and breath sounds normal. No respiratory distress.   Abdominal: Soft. Bowel sounds are normal. She exhibits no distension and no mass. There is no hepatosplenomegaly. There is no tenderness. There is no rebound and no guarding. No hernia.   Musculoskeletal: She exhibits no edema.   Neurological: She is alert and oriented to person, place, and time. Coordination and gait normal.   Skin: Skin is warm and dry.   Psychiatric: She has a normal mood and affect. Her speech is normal and behavior is normal. Judgment and thought content normal.   Nursing note and vitals reviewed.      Assessment/Plan   Diagnoses and all orders for this visit:    Upper respiratory tract infection, unspecified type  -     methylPREDNISolone acetate (DEPO-medrol) injection 80 mg; Inject 1 mL into the appropriate muscle as directed by prescriber 1 (One) Time.  -     triamcinolone acetonide (KENALOG-40) injection 40 mg; Inject 1 mL into the appropriate muscle as directed by prescriber 1 (One) Time.    Right ear pain    Essential hypertension    Pink eye, left    Cough  -     methylPREDNISolone acetate (DEPO-medrol) injection 80 mg; Inject 1 mL into the appropriate muscle as directed by prescriber 1 (One) Time.  -     triamcinolone acetonide (KENALOG-40) injection 40 mg; Inject 1 mL into the appropriate muscle as directed by prescriber 1 (One) Time.    Subacute allergic otitis media of right ear, recurrence not specified    Other orders  -     doxycycline (VIBRAMYICN) 100 MG tablet; Take 1 tablet by mouth 2 (Two) Times a Day.      Upper respiratory tract infection not stable and we will get steroid Dosepaks injections  Right ear pain we will treat with doxycycline  Hypertension well-controlled on current medication  Cough not stable and we will provide medication for this  Subacute allergic otitis of the right ear provide doxycycline for that  Pinkeye, left we will utilize  doxycycline

## 2019-02-04 ENCOUNTER — ANESTHESIA (OUTPATIENT)
Dept: GASTROENTEROLOGY | Facility: HOSPITAL | Age: 74
End: 2019-02-04

## 2019-02-04 ENCOUNTER — HOSPITAL ENCOUNTER (OUTPATIENT)
Facility: HOSPITAL | Age: 74
Setting detail: HOSPITAL OUTPATIENT SURGERY
Discharge: HOME | End: 2019-02-04
Attending: INTERNAL MEDICINE | Admitting: INTERNAL MEDICINE

## 2019-02-04 ENCOUNTER — ANESTHESIA EVENT (OUTPATIENT)
Dept: GASTROENTEROLOGY | Facility: HOSPITAL | Age: 74
End: 2019-02-04

## 2019-02-04 VITALS
DIASTOLIC BLOOD PRESSURE: 66 MMHG | SYSTOLIC BLOOD PRESSURE: 126 MMHG | OXYGEN SATURATION: 100 % | BODY MASS INDEX: 23.63 KG/M2 | HEIGHT: 66 IN | TEMPERATURE: 97.7 F | HEART RATE: 81 BPM | RESPIRATION RATE: 16 BRPM | WEIGHT: 147 LBS

## 2019-02-04 DIAGNOSIS — Z80.0 FH: COLON CANCER: ICD-10-CM

## 2019-02-04 DIAGNOSIS — K63.5 COLON POLYP: ICD-10-CM

## 2019-02-04 PROCEDURE — 45380 COLONOSCOPY AND BIOPSY: CPT | Performed by: INTERNAL MEDICINE

## 2019-02-04 PROCEDURE — 88305 TISSUE EXAM BY PATHOLOGIST: CPT | Performed by: INTERNAL MEDICINE

## 2019-02-04 PROCEDURE — 25010000002 PROPOFOL 10 MG/ML EMULSION: Performed by: ANESTHESIOLOGY

## 2019-02-04 RX ORDER — SODIUM CHLORIDE 0.9 % (FLUSH) 0.9 %
3 SYRINGE (ML) INJECTION AS NEEDED
Status: DISCONTINUED | OUTPATIENT
Start: 2019-02-04 | End: 2019-02-04 | Stop reason: HOSPADM

## 2019-02-04 RX ORDER — FLUMAZENIL 0.1 MG/ML
0.2 INJECTION INTRAVENOUS AS NEEDED
Status: DISCONTINUED | OUTPATIENT
Start: 2019-02-04 | End: 2019-02-04 | Stop reason: HOSPADM

## 2019-02-04 RX ORDER — LIDOCAINE HYDROCHLORIDE 20 MG/ML
INJECTION, SOLUTION INFILTRATION; PERINEURAL AS NEEDED
Status: DISCONTINUED | OUTPATIENT
Start: 2019-02-04 | End: 2019-02-04 | Stop reason: SURG

## 2019-02-04 RX ORDER — MELATONIN
1000 DAILY
COMMUNITY

## 2019-02-04 RX ORDER — LIDOCAINE HYDROCHLORIDE 10 MG/ML
0.5 INJECTION, SOLUTION INFILTRATION; PERINEURAL ONCE AS NEEDED
Status: DISCONTINUED | OUTPATIENT
Start: 2019-02-04 | End: 2019-02-04 | Stop reason: HOSPADM

## 2019-02-04 RX ORDER — PROMETHAZINE HYDROCHLORIDE 25 MG/1
25 SUPPOSITORY RECTAL ONCE AS NEEDED
Status: DISCONTINUED | OUTPATIENT
Start: 2019-02-04 | End: 2019-02-04 | Stop reason: HOSPADM

## 2019-02-04 RX ORDER — DIPHENHYDRAMINE HCL 25 MG
25 CAPSULE ORAL EVERY 6 HOURS PRN
Status: DISCONTINUED | OUTPATIENT
Start: 2019-02-04 | End: 2019-02-04 | Stop reason: HOSPADM

## 2019-02-04 RX ORDER — EPHEDRINE SULFATE 50 MG/ML
5 INJECTION, SOLUTION INTRAVENOUS ONCE AS NEEDED
Status: DISCONTINUED | OUTPATIENT
Start: 2019-02-04 | End: 2019-02-04 | Stop reason: HOSPADM

## 2019-02-04 RX ORDER — DIPHENHYDRAMINE HCL 25 MG
25 CAPSULE ORAL
Status: DISCONTINUED | OUTPATIENT
Start: 2019-02-04 | End: 2019-02-04 | Stop reason: HOSPADM

## 2019-02-04 RX ORDER — FENTANYL CITRATE 50 UG/ML
50 INJECTION, SOLUTION INTRAMUSCULAR; INTRAVENOUS
Status: DISCONTINUED | OUTPATIENT
Start: 2019-02-04 | End: 2019-02-04 | Stop reason: HOSPADM

## 2019-02-04 RX ORDER — PROMETHAZINE HYDROCHLORIDE 25 MG/ML
12.5 INJECTION, SOLUTION INTRAMUSCULAR; INTRAVENOUS ONCE AS NEEDED
Status: DISCONTINUED | OUTPATIENT
Start: 2019-02-04 | End: 2019-02-04 | Stop reason: HOSPADM

## 2019-02-04 RX ORDER — ACETAMINOPHEN 650 MG/1
650 SUPPOSITORY RECTAL ONCE AS NEEDED
Status: DISCONTINUED | OUTPATIENT
Start: 2019-02-04 | End: 2019-02-04 | Stop reason: HOSPADM

## 2019-02-04 RX ORDER — PROMETHAZINE HYDROCHLORIDE 25 MG/1
25 TABLET ORAL ONCE AS NEEDED
Status: DISCONTINUED | OUTPATIENT
Start: 2019-02-04 | End: 2019-02-04 | Stop reason: HOSPADM

## 2019-02-04 RX ORDER — NALOXONE HCL 0.4 MG/ML
0.2 VIAL (ML) INJECTION AS NEEDED
Status: DISCONTINUED | OUTPATIENT
Start: 2019-02-04 | End: 2019-02-04 | Stop reason: HOSPADM

## 2019-02-04 RX ORDER — HYDROCODONE BITARTRATE AND ACETAMINOPHEN 7.5; 325 MG/1; MG/1
1 TABLET ORAL ONCE AS NEEDED
Status: DISCONTINUED | OUTPATIENT
Start: 2019-02-04 | End: 2019-02-04 | Stop reason: HOSPADM

## 2019-02-04 RX ORDER — SODIUM CHLORIDE, SODIUM LACTATE, POTASSIUM CHLORIDE, CALCIUM CHLORIDE 600; 310; 30; 20 MG/100ML; MG/100ML; MG/100ML; MG/100ML
1000 INJECTION, SOLUTION INTRAVENOUS CONTINUOUS
Status: DISCONTINUED | OUTPATIENT
Start: 2019-02-04 | End: 2019-02-04 | Stop reason: HOSPADM

## 2019-02-04 RX ORDER — ONDANSETRON 2 MG/ML
4 INJECTION INTRAMUSCULAR; INTRAVENOUS ONCE AS NEEDED
Status: DISCONTINUED | OUTPATIENT
Start: 2019-02-04 | End: 2019-02-04 | Stop reason: HOSPADM

## 2019-02-04 RX ORDER — ASCORBIC ACID 500 MG
500 TABLET ORAL DAILY
COMMUNITY

## 2019-02-04 RX ORDER — HYDROMORPHONE HCL 110MG/55ML
0.5 PATIENT CONTROLLED ANALGESIA SYRINGE INTRAVENOUS
Status: DISCONTINUED | OUTPATIENT
Start: 2019-02-04 | End: 2019-02-04 | Stop reason: HOSPADM

## 2019-02-04 RX ORDER — PROPOFOL 10 MG/ML
VIAL (ML) INTRAVENOUS CONTINUOUS PRN
Status: DISCONTINUED | OUTPATIENT
Start: 2019-02-04 | End: 2019-02-04 | Stop reason: SURG

## 2019-02-04 RX ORDER — PROPOFOL 10 MG/ML
VIAL (ML) INTRAVENOUS AS NEEDED
Status: DISCONTINUED | OUTPATIENT
Start: 2019-02-04 | End: 2019-02-04 | Stop reason: SURG

## 2019-02-04 RX ORDER — ACETAMINOPHEN 325 MG/1
650 TABLET ORAL ONCE AS NEEDED
Status: DISCONTINUED | OUTPATIENT
Start: 2019-02-04 | End: 2019-02-04 | Stop reason: HOSPADM

## 2019-02-04 RX ORDER — OXYCODONE AND ACETAMINOPHEN 7.5; 325 MG/1; MG/1
1 TABLET ORAL ONCE AS NEEDED
Status: DISCONTINUED | OUTPATIENT
Start: 2019-02-04 | End: 2019-02-04 | Stop reason: HOSPADM

## 2019-02-04 RX ADMIN — PROPOFOL 100 MG: 10 INJECTION, EMULSION INTRAVENOUS at 13:56

## 2019-02-04 RX ADMIN — PROPOFOL 100 MCG/KG/MIN: 10 INJECTION, EMULSION INTRAVENOUS at 13:56

## 2019-02-04 RX ADMIN — SODIUM CHLORIDE, POTASSIUM CHLORIDE, SODIUM LACTATE AND CALCIUM CHLORIDE 1000 ML: 600; 310; 30; 20 INJECTION, SOLUTION INTRAVENOUS at 13:24

## 2019-02-04 RX ADMIN — LIDOCAINE HYDROCHLORIDE 100 MG: 20 INJECTION, SOLUTION INFILTRATION; PERINEURAL at 13:53

## 2019-02-04 NOTE — ANESTHESIA PREPROCEDURE EVALUATION
Anesthesia Evaluation     Patient summary reviewed and Nursing notes reviewed   history of anesthetic complications:  NPO Solid Status: > 8 hours  NPO Liquid Status: > 2 hours           Airway   Mallampati: II  TM distance: >3 FB  Neck ROM: full  Dental - normal exam     Pulmonary - normal exam   (+) recent URI,   Cardiovascular - normal exam  Exercise tolerance: good (4-7 METS)    (+) hypertension,       Neuro/Psych- negative ROS  GI/Hepatic/Renal/Endo - negative ROS     Musculoskeletal (-) negative ROS    Abdominal  - normal exam    Bowel sounds: normal.   Substance History - negative use     OB/GYN negative ob/gyn ROS         Other                        Anesthesia Plan    ASA 2     MAC     intravenous induction   Anesthetic plan, all risks, benefits, and alternatives have been provided, discussed and informed consent has been obtained with: patient.

## 2019-02-04 NOTE — H&P
"[facility]  Pre Procedure History & Physical    Chief Complaint:   72 yo female with a h/o colon polyps. Her grandfather and nephew had colon cancer. She last had a colonoscopy in [place] about 2 yrs ago.     Subjective     HPI:   73 y.o. female with a h/o colon polyps. Her grandfather and nephew had colon cancer. She last had a colonoscopy in [place] about 2 yrs ago.         Past Medical History:   Past Medical History:   Diagnosis Date   • Colon polyp    • Glaucoma    • Hypertension        Family History:  Family History   Problem Relation Age of Onset   • Emphysema Mother    • Stroke Father        Social History:   reports that she has quit smoking. Her smoking use included cigarettes. she has never used smokeless tobacco. She reports that she drinks alcohol.    Medications:   Medications Prior to Admission   Medication Sig Dispense Refill Last Dose   • cholecalciferol (VITAMIN D3) 1000 units tablet Take 1,000 Units by mouth Daily.   2/2/2019   • dorzolamide-timolol (COSOPT) 22.3-6.8 MG/ML ophthalmic solution INSTILL 1 DROP BY OPHTHALMIC ROUTE 2 TIMES EVERY DAY INTO BOTH EYES  6 2/3/2019 at Unknown time   • latanoprost (XALATAN) 0.005 % ophthalmic solution INSTILL 1 DROP BY OPHTHALMIC ROUTE EVERY DAY BOTH EYES AT BEDTIME  6 2/3/2019 at Unknown time   • losartan-hydrochlorothiazide (HYZAAR) 100-25 MG per tablet Take 1 tablet by mouth Daily. 90 tablet 3 2/4/2019 at Unknown time   • vitamin C (ASCORBIC ACID) 500 MG tablet Take 500 mg by mouth Daily.   2/2/2019   • doxycycline (VIBRAMYICN) 100 MG tablet Take 1 tablet by mouth 2 (Two) Times a Day. 20 tablet 0    • tobramycin 0.3 % solution ophthalmic solution INSTILL 1-2 DROPS EVERY 4 HOURS FOR 7 DAYS  8 Unknown at Unknown time       Allergies:  Penicillins    ROS:    Pertinent items are noted in HPI     Objective     Blood pressure 168/94, pulse 59, temperature 97.7 °F (36.5 °C), temperature source Oral, resp. rate 16, height 167.6 cm (66\"), " weight 66.7 kg (147 lb), SpO2 99 %, not currently breastfeeding.    Physical Exam   Constitutional: Pt is oriented to person, place, and time and well-developed, well-nourished, and in no distress.   HENT:   Mouth/Throat: Oropharynx is clear and moist.   Neck: Normal range of motion. Neck supple.   Cardiovascular: Normal rate, regular rhythm and normal heart sounds.    Pulmonary/Chest: Effort normal and breath sounds normal. No respiratory distress. No  wheezes.   Abdominal: Soft. Bowel sounds are normal.   Skin: Skin is warm and dry.   Psychiatric: Mood, memory, affect and judgment normal.     Assessment/Plan     Diagnosis:  73 y.o. female with a h/o colon polyps. Her grandfather and nephew had colon cancer. She last had a colonoscopy in [place] about 2 yrs ago.     Anticipated Surgical Procedure:  Colonoscopy    The risks, benefits, and alternatives of this procedure have been discussed with the patient or the responsible party- the patient understands and agrees to proceed.

## 2019-02-04 NOTE — ANESTHESIA POSTPROCEDURE EVALUATION
Patient: Kamilla Hawthorne    Procedure Summary     Date:  02/04/19 Room / Location:   [facility]    Anesthesia Start:  1352 Anesthesia Stop:  1425    Procedure:  COLONOSCOPY into cecum and TI with cold biopsy polypectomies (N/A ) Diagnosis:       Colon polyp      FH: colon cancer      (Colon polyp [K63.5])      (FH: colon cancer [Z80.0])    Surgeon:  Suhail Rothman MD Provider:  Olya Hayden MD    Anesthesia Type:  MAC ASA Status:  2          Anesthesia Type: MAC  Last vitals  BP   168/94 (02/04/19 1313)   Temp   36.5 °C (97.7 °F) (02/04/19 1313)   Pulse   59 (02/04/19 1313)   Resp   16 (02/04/19 1313)     SpO2   99 % (02/04/19 1313)     Post Anesthesia Care and Evaluation    Patient location during evaluation: PACU  Patient participation: complete - patient participated  Level of consciousness: sleepy but conscious  Pain management: adequate  Airway patency: patent  Anesthetic complications: No anesthetic complications    Cardiovascular status: acceptable  Respiratory status: acceptable  Hydration status: acceptable

## 2019-02-04 NOTE — DISCHARGE INSTRUCTIONS
Dr. Rothman   [phone]    Colonoscopy, Adult, Care After    This sheet gives you information about how to care for yourself after your procedure. Your doctor may also give you more specific instructions. If you have problems or questions, call your doctor.  Follow these instructions at home:  General instructions    · For the first 24 hours after the procedure:  ? Do not drive or use machinery.  ? Do not sign important documents.  ? Do not drink alcohol.  ? Do your daily activities more slowly than normal.  ? Eat foods that are soft and easy to digest.  ? Rest often.  · Take over-the-counter or prescription medicines only as told by your doctor.  · It is up to you to get the results of your procedure. Ask your doctor, or the department performing the procedure, when your results will be ready.  To help cramping and bloating:  · Try walking around.  · Put heat on your belly (abdomen) as told by your doctor. Use a heat source that your doctor recommends, such as a moist heat pack or a heating pad.  ? Put a towel between your skin and the heat source.  ? Leave the heat on for 20-30 minutes.  ? Remove the heat if your skin turns bright red. This is especially important if you cannot feel pain, heat, or cold. You can get burned.  Eating and drinking  · Drink enough fluid to keep your pee (urine) clear or pale yellow.  · Return to your normal diet as told by your doctor. Avoid heavy or fried foods that are hard to digest.  · Avoid drinking alcohol for as long as told by your doctor.  Contact a doctor if:  · You have blood in your poop (stool) 2-3 days after the procedure.  Get help right away if:  · You have more than a small amount of blood in your poop.  · You see large clumps of tissue (blood clots) in your poop.  · Your belly is swollen.  · You feel sick to your stomach (nauseous).  · You throw up (vomit).  · You have a fever.  · You have belly pain that gets worse, and medicine does not help your pain.  This  information is not intended to replace advice given to you by your health care provider. Make sure you discuss any questions you have with your health care provider.  Document Released: 01/20/2012 Document Revised: 09/11/2017 Document Reviewed: 09/11/2017  [facility] Interactive Patient Education © 2017 [facility] [employer]      Hemorrhoids    Hemorrhoids are swollen veins in and around the rectum or anus. Hemorrhoids can cause pain, itching, or bleeding. Most of the time, they do not cause serious problems. They usually get better with diet changes, lifestyle changes, and other home treatments.  Follow these instructions at home:  Eating and drinking  · Eat foods that have fiber, such as whole grains, beans, nuts, fruits, and vegetables. Ask your doctor about taking products that have added fiber (fiber supplements).  · Drink enough fluid to keep your pee (urine) clear or pale yellow.  For Pain and Swelling  · Take a warm-water bath (sitz bath) for 20 minutes to ease pain. Do this 3-4 times a day.  · If directed, put ice on the painful area. It may be helpful to use ice between your warm baths.  ? Put ice in a plastic bag.  ? Place a towel between your skin and the bag.  ? Leave the ice on for 20 minutes, 2-3 times a day.  General instructions  · Take over-the-counter and prescription medicines only as told by your doctor.  ? Medicated creams and medicines that are inserted into the anus (suppositories) may be used or applied as told.  · Exercise often.  · Go to the bathroom when you have the urge to poop (to have a bowel movement). Do not wait.  · Avoid pushing too hard (straining) when you poop.  · Keep the butt area dry and clean. Use wet toilet paper or moist paper towels.  · Do not sit on the toilet for a long time.  Contact a doctor if:  · You have any of these:  ? Pain and swelling that do not get better with treatment or medicine.  ? Bleeding that will not stop.  ? Trouble pooping or you cannot poop.  ? Pain or  swelling outside the area of the hemorrhoids.  This information is not intended to replace advice given to you by your health care provider. Make sure you discuss any questions you have with your health care provider.  Document Released: 09/26/2009 Document Revised: 05/25/2017 Document Reviewed: 08/31/2016  [employer] Interactive Patient Education © 2018 [employer]      Colon Polyps    Polyps are tissue growths inside the body. Polyps can grow in many places, including the large intestine (colon). A polyp may be a round bump or a mushroom-shaped growth. You could have one polyp or several.  Most colon polyps are noncancerous (benign). However, some colon polyps can become cancerous over time.  What are the causes?  The exact cause of colon polyps is not known.  What increases the risk?  This condition is more likely to develop in people who:  · Have a family history of colon cancer or colon polyps.  · Are older than 50 or older than 45 if they are .  · Have inflammatory bowel disease, such as ulcerative colitis or Crohn disease.  · Are overweight.  · Smoke cigarettes.  · Do not get enough exercise.  · Drink too much alcohol.  · Eat a diet that is:  ? High in fat and red meat.  ? Low in fiber.  · Had childhood cancer that was treated with abdominal radiation.    What are the signs or symptoms?  Most polyps do not cause symptoms. If you have symptoms, they may include:  · Blood coming from your rectum when having a bowel movement.  · Blood in your stool. The stool may look dark red or black.  · A change in bowel habits, such as constipation or diarrhea.    How is this diagnosed?  This condition is diagnosed with a colonoscopy. This is a procedure that uses a lighted, flexible scope to look at the inside of your colon.  How is this treated?  Treatment for this condition involves removing any polyps that are found. Those polyps will then be tested for cancer. If cancer is found, your health care  provider will talk to you about options for colon cancer treatment.  Follow these instructions at home:  Diet  · Eat plenty of fiber, such as fruits, vegetables, and whole grains.  · Eat foods that are high in calcium and vitamin D, such as milk, cheese, yogurt, eggs, liver, fish, and broccoli.  · Limit foods high in fat, red meats, and processed meats, such as hot dogs, sausage, blake, and lunch meats.  · Maintain a healthy weight, or lose weight if recommended by your health care provider.  General instructions  · Do not smoke cigarettes.  · Do not drink alcohol excessively.  · Keep all follow-up visits as told by your health care provider. This is important. This includes keeping regularly scheduled colonoscopies. Talk to your health care provider about when you need a colonoscopy.  · Exercise every day or as told by your health care provider.  Contact a health care provider if:  · You have new or worsening bleeding during a bowel movement.  · You have new or increased blood in your stool.  · You have a change in bowel habits.  · You unexpectedly lose weight.  This information is not intended to replace advice given to you by your health care provider. Make sure you discuss any questions you have with your health care provider.  Document Released: 09/13/2005 Document Revised: 05/25/2017 Document Reviewed: 11/07/2016  [employer] Interactive Patient Education © 2018 [employer]

## 2019-02-05 LAB
CYTO UR: NORMAL
LAB AP CASE REPORT: NORMAL
PATH REPORT.FINAL DX SPEC: NORMAL
PATH REPORT.GROSS SPEC: NORMAL

## 2019-02-18 ENCOUNTER — TELEPHONE (OUTPATIENT)
Dept: GASTROENTEROLOGY | Facility: CLINIC | Age: 74
End: 2019-02-18

## 2019-02-18 NOTE — TELEPHONE ENCOUNTER
----- Message from Suhail Mckee sent at 2/18/2019 10:29 AM EST -----  Regarding: C/S results  Contact: [phone]  Please call with results.

## 2019-02-18 NOTE — TELEPHONE ENCOUNTER
Called pt and advised pt that Dr Lopezine it out of the office until Wed but will send message to him for results .  Pt verb understanding.

## 2019-02-20 ENCOUNTER — TELEPHONE (OUTPATIENT)
Dept: GASTROENTEROLOGY | Facility: CLINIC | Age: 74
End: 2019-02-20

## 2019-02-20 NOTE — TELEPHONE ENCOUNTER
----- Message from Suhail Rothman MD sent at 2/20/2019  7:30 AM EST -----  MT/SA - Tell her that the colon polyps that were removed were not cancerous but many were precancerous. One polyp had some inflammation associated with it. I am not sure why that is (resolving infection in the colon, medication side effect, etc?). I would recommend a repeat colonoscopy in 2 years. If she wants to discuss in more detail then she could f/u with me in the office.  Dr. BIJAN SANDERS. Thx. kjh

## 2019-02-20 NOTE — TELEPHONE ENCOUNTER
Called pt and advised per Dr Rothman that the colon polyp that were removed were not cancerous but many were precancerous .  One polyp had some inflammation associated with it.  He is not sure why that is(resolving infection in the colon , medication side effect?).  He recommends a repeat c/s in 2 yrs and if she wants to discuss in more detail then she can f/u in office. Pt verb understanding and states she does not need a f/u.      C/s placed in recall for 02/04/2021.

## 2019-05-07 ENCOUNTER — TELEPHONE (OUTPATIENT)
Dept: INTERNAL MEDICINE | Facility: CLINIC | Age: 74
End: 2019-05-07

## 2019-05-09 RX ORDER — CANDESARTAN CILEXETIL AND HYDROCHLOROTHIAZIDE 32; 25 MG/1; MG/1
1 TABLET ORAL DAILY
Qty: 90 EACH | Refills: 1 | Status: SHIPPED | OUTPATIENT
Start: 2019-05-09 | End: 2019-08-23

## 2019-08-23 ENCOUNTER — OFFICE VISIT (OUTPATIENT)
Dept: INTERNAL MEDICINE | Facility: CLINIC | Age: 74
End: 2019-08-23

## 2019-08-23 VITALS
BODY MASS INDEX: 24.3 KG/M2 | HEART RATE: 62 BPM | TEMPERATURE: 98.6 F | HEIGHT: 66 IN | RESPIRATION RATE: 16 BRPM | DIASTOLIC BLOOD PRESSURE: 80 MMHG | WEIGHT: 151.2 LBS | OXYGEN SATURATION: 97 % | SYSTOLIC BLOOD PRESSURE: 148 MMHG

## 2019-08-23 DIAGNOSIS — Z85.820 HISTORY OF MELANOMA: ICD-10-CM

## 2019-08-23 DIAGNOSIS — Z12.31 SCREENING MAMMOGRAM, ENCOUNTER FOR: ICD-10-CM

## 2019-08-23 DIAGNOSIS — I10 ESSENTIAL HYPERTENSION: ICD-10-CM

## 2019-08-23 DIAGNOSIS — Z76.89 ENCOUNTER TO ESTABLISH CARE: Primary | ICD-10-CM

## 2019-08-23 DIAGNOSIS — Z00.00 HEALTHCARE MAINTENANCE: ICD-10-CM

## 2019-08-23 PROCEDURE — 99214 OFFICE O/P EST MOD 30 MIN: CPT | Performed by: NURSE PRACTITIONER

## 2019-08-23 RX ORDER — LOSARTAN POTASSIUM AND HYDROCHLOROTHIAZIDE 25; 100 MG/1; MG/1
1 TABLET ORAL DAILY
COMMUNITY
End: 2019-09-10 | Stop reason: SDUPTHER

## 2019-08-23 NOTE — PROGRESS NOTES
Subjective   Kamilla Hawthorne is a 73 y.o. female.   CC:   Patient presents for transfer of care.  This is a 73-year-old female former patient of Dr. Guido.  This patient is new to me.    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  She follows with dermatology, Dr. Lozano.  This was removed in July 2019.  The site is healing well and she has had no further complications with healing.  She will follow-up in September 2019 with dermatology.    She has a history of vitamin D deficiency takes 1000 units of vitamin D3 daily.  She reports excellent compliance and toleration with this medication.    She also has a history of hypertension.  Blood pressure is elevated today at 148/80 but she states that she and her  watch it at home routinely and it is consistently less than 130/80.  She takes candesartan-HCTZ 32-25 mg daily and reports excellent compliance and toleration with this medication.  She denies headache, visual changes, shortness of breath, chest discomfort.    She reports that she is due for screening mammogram and requests that this be ordered today.  She denies development of any other new issues today.         The following portions of the patient's history were reviewed and updated as appropriate: allergies, current medications, past family history, past medical history, past social history, past surgical history and problem list.    Review of Systems   Constitutional: Negative for activity change, chills, fatigue, fever, unexpected weight gain and unexpected weight loss.   HENT: Negative for congestion, hearing loss, postnasal drip, sinus pressure, sneezing, sore throat, swollen glands and tinnitus.    Eyes: Negative for photophobia, pain and visual disturbance.   Respiratory: Negative for cough, chest tightness, shortness of breath and wheezing.    Cardiovascular: Negative for chest pain, palpitations and leg swelling.   Gastrointestinal: Negative for abdominal distention, abdominal  "pain, constipation, diarrhea, nausea and vomiting.   Endocrine: Negative for polydipsia, polyphagia and polyuria.   Genitourinary: Negative for dysuria, frequency, hematuria and urgency.   Neurological: Negative for dizziness, weakness, numbness and headache.   All other systems reviewed and are negative.      Objective    /80 Comment: manual recheck  Pulse 62   Temp 98.6 °F (37 °C) (Oral)   Resp 16   Ht 167.6 cm (66\")   Wt 68.6 kg (151 lb 3.2 oz)   LMP  (LMP Unknown)   SpO2 97%   BMI 24.40 kg/m²     Physical Exam   Constitutional: She is oriented to person, place, and time. She appears well-developed and well-nourished. No distress.   HENT:   Head: Normocephalic and atraumatic.   Right Ear: External ear normal.   Left Ear: External ear normal.   Nose: Nose normal.   Mouth/Throat: Oropharynx is clear and moist.   Eyes: EOM are normal. Pupils are equal, round, and reactive to light.   Neck: Normal range of motion. Neck supple.   No carotid bruits auscultated   Cardiovascular: Normal rate, regular rhythm, normal heart sounds and intact distal pulses. Exam reveals no gallop and no friction rub.   No murmur heard.  Posterior tib and pedal pulses 2+ and equal bilaterally.  No peripheral edema.   Pulmonary/Chest: Effort normal and breath sounds normal. No stridor. No respiratory distress. She has no wheezes. She has no rales. She exhibits no tenderness.   Lungs are CTA bilaterally   Abdominal: Soft. Bowel sounds are normal. She exhibits no distension. There is no tenderness.   Musculoskeletal: Normal range of motion.   Neurological: She is alert and oriented to person, place, and time.   Skin: Skin is warm and dry. Capillary refill takes less than 2 seconds. She is not diaphoretic.   Surgical incision to right posterior calf.  Well approximated, no surrounding erythema or drainage.   Psychiatric: She has a normal mood and affect. Her behavior is normal. Judgment and thought content normal.   Nursing note and " vitals reviewed.    Current outpatient and discharge medications have been reconciled for the patient.  Reviewed by: BELLA Quiñones      Assessment/Plan   Diagnoses and all orders for this visit:    Encounter to establish care    Essential hypertension    History of melanoma    Healthcare maintenance  -     Mammo Screening Bilateral With CAD; Future    Screening mammogram, encounter for  -     Mammo Screening Bilateral With CAD; Future    Other orders  -     Cancel: CBC & Differential  -     Cancel: Comprehensive metabolic panel  -     Cancel: Lipid panel  -     Cancel: Urinalysis With Microscopic - Urine, Clean Catch    -Establish care: Reviewed patient's current medications and active problem list.  She is due for screening mammogram which I have ordered.    -Hypertension: Mildly elevated today at 140/80.  She will continue to monitor routinely at home and let me know if blood pressures rise consistently above 130/80.  Continue candesartan-HCTZ 32-25 mg daily.    -History of melanoma: Following routinely with dermatology, Dr. Blake Vizcarra.  The site looks great.  She will follow-up in September per previously scheduled appointment with dermatology.    -She has not had labs in September 2018.  She does not wish to get them today as her  is waiting for her.  We will see her back in 6 months, and educated her on the importance that we get labs at that time to recheck cholesterol, metabolic panel and blood count.    -She may pursue the Shingrix vaccine at her pharmacy.    -We will see her back in 6 months for routine health maintenance, fasting labs and follow-up on chronic conditions.  Follow-up PRN in the meantime.

## 2019-08-27 ENCOUNTER — TRANSCRIBE ORDERS (OUTPATIENT)
Dept: ADMINISTRATIVE | Facility: HOSPITAL | Age: 74
End: 2019-08-27

## 2019-08-27 DIAGNOSIS — Z12.39 SCREENING BREAST EXAMINATION: Primary | ICD-10-CM

## 2019-09-10 RX ORDER — LOSARTAN POTASSIUM AND HYDROCHLOROTHIAZIDE 25; 100 MG/1; MG/1
TABLET ORAL
Qty: 90 TABLET | Refills: 3 | Status: SHIPPED | OUTPATIENT
Start: 2019-09-10 | End: 2020-08-26 | Stop reason: SDUPTHER

## 2019-09-16 ENCOUNTER — HOSPITAL ENCOUNTER (OUTPATIENT)
Dept: MAMMOGRAPHY | Facility: HOSPITAL | Age: 74
Discharge: HOME | End: 2019-09-16
Admitting: NURSE PRACTITIONER

## 2019-09-16 DIAGNOSIS — Z12.39 SCREENING BREAST EXAMINATION: ICD-10-CM

## 2019-09-16 PROCEDURE — 77063 BREAST TOMOSYNTHESIS BI: CPT

## 2019-09-16 PROCEDURE — 77067 SCR MAMMO BI INCL CAD: CPT

## 2019-09-16 NOTE — PROGRESS NOTES
Please notify patient that her mammogram is showing no evidence of malignancy or significant change in either breast.  We will follow-up with routine mammography.

## 2019-11-25 ENCOUNTER — OFFICE VISIT (OUTPATIENT)
Dept: RETAIL CLINIC | Facility: CLINIC | Age: 74
End: 2019-11-25

## 2019-11-25 VITALS
HEART RATE: 81 BPM | SYSTOLIC BLOOD PRESSURE: 142 MMHG | DIASTOLIC BLOOD PRESSURE: 84 MMHG | TEMPERATURE: 98.1 F | OXYGEN SATURATION: 97 %

## 2019-11-25 DIAGNOSIS — H10.9 CONJUNCTIVITIS OF BOTH EYES, UNSPECIFIED CONJUNCTIVITIS TYPE: Primary | ICD-10-CM

## 2019-11-25 DIAGNOSIS — J40 BRONCHITIS: ICD-10-CM

## 2019-11-25 PROCEDURE — 99213 OFFICE O/P EST LOW 20 MIN: CPT | Performed by: NURSE PRACTITIONER

## 2019-11-25 RX ORDER — GUAIFENESIN 600 MG/1
600 TABLET, EXTENDED RELEASE ORAL 2 TIMES DAILY
Qty: 28 TABLET | Refills: 0 | Status: SHIPPED | OUTPATIENT
Start: 2019-11-25 | End: 2019-12-09

## 2019-11-25 RX ORDER — DEXTROMETHORPHAN HYDROBROMIDE AND PROMETHAZINE HYDROCHLORIDE 15; 6.25 MG/5ML; MG/5ML
5 SYRUP ORAL NIGHTLY PRN
Qty: 118 ML | Refills: 0 | Status: SHIPPED | OUTPATIENT
Start: 2019-11-25 | End: 2020-02-26

## 2019-11-25 RX ORDER — POLYMYXIN B SULFATE AND TRIMETHOPRIM 1; 10000 MG/ML; [USP'U]/ML
SOLUTION OPHTHALMIC
Qty: 1 EACH | Refills: 0 | Status: SHIPPED | OUTPATIENT
Start: 2019-11-25 | End: 2020-02-26

## 2019-11-25 RX ORDER — PREDNISONE 20 MG/1
20 TABLET ORAL 2 TIMES DAILY
Qty: 10 TABLET | Refills: 0 | Status: SHIPPED | OUTPATIENT
Start: 2019-11-25 | End: 2020-02-26

## 2019-11-25 NOTE — PROGRESS NOTES
Subjective:     Kamilla Hawthorne is a 73 y.o.     Conjunctivitis    The current episode started yesterday. Associated symptoms include congestion, rhinorrhea, cough, eye discharge (bilateral) and eye redness (bilateral). Pertinent negatives include no fever, no eye itching, no wheezing and no eye pain. Ear pain: starting to feel plugged. Sore throat: resolved.   Cough   Episode onset: started 6 days ago. The cough is productive of sputum. Associated symptoms include ear congestion, eye redness (bilateral), postnasal drip and rhinorrhea. Pertinent negatives include no fever, nasal congestion, shortness of breath or wheezing. Ear pain: starting to feel plugged. Sore throat: resolved. Treatments tried: airborne, coricidin D, claritin, cough medication. The treatment provided no relief.         The following portions of the patient's history were reviewed and updated as appropriate: allergies, current medications, past family history, past medical history, past social history, past surgical history and problem list.      Review of Systems   Constitutional: Negative for fever.   HENT: Positive for congestion, postnasal drip and rhinorrhea. Negative for sinus pressure and sinus pain. Ear pain: starting to feel plugged. Sore throat: resolved.    Eyes: Positive for discharge (bilateral) and redness (bilateral). Negative for pain and itching.   Respiratory: Positive for cough. Negative for shortness of breath and wheezing.    Cardiovascular:        Hx:hypertension         Objective:      Physical Exam   Constitutional: She appears well-developed and well-nourished.   HENT:   Head: Normocephalic and atraumatic.   Right Ear: Tympanic membrane and ear canal normal.   Left Ear: Tympanic membrane and ear canal normal.   Mouth/Throat: No oropharyngeal exudate or posterior oropharyngeal erythema.   Post nasal drainage noted   Eyes: Right eye exhibits exudate. Left eye exhibits exudate. Right conjunctiva is injected. Left conjunctiva  is injected.   Right eye uncorrected 20/40  Left eye uncorrected 20/70   Cardiovascular: Normal rate, regular rhythm, S1 normal, S2 normal and normal heart sounds.   Pulmonary/Chest: She has rhonchi in the right upper field and the left upper field.   Intermittent cough at visit   Vitals reviewed.          Diagnoses and all orders for this visit:    Conjunctivitis of both eyes, unspecified conjunctivitis type    Bronchitis    Other orders  -     trimethoprim-polymyxin b (POLYTRIM) 27158-3.1 UNIT/ML-% ophthalmic solution; 1 drop in affected eye every two hours while awake for conjunctivitis  -     predniSONE (DELTASONE) 20 MG tablet; Take 1 tablet by mouth 2 (Two) Times a Day.  -     guaiFENesin (MUCINEX) 600 MG 12 hr tablet; Take 1 tablet by mouth 2 (Two) Times a Day for 14 days.  -     promethazine-dextromethorphan (PROMETHAZINE-DM) 6.25-15 MG/5ML syrup; Take 5 mL by mouth At Night As Needed for Cough.

## 2019-11-25 NOTE — PATIENT INSTRUCTIONS
Bacterial Conjunctivitis, Adult  Bacterial conjunctivitis is an infection of the clear membrane that covers the white part of your eye and the inner surface of your eyelid (conjunctiva). When the blood vessels in your conjunctiva become inflamed, your eye becomes red or pink, and it will probably feel itchy. Bacterial conjunctivitis spreads very easily from person to person (is contagious). It also spreads easily from one eye to the other eye.  What are the causes?  This condition is caused by bacteria. You may get the infection if you come into close contact with:  · A person who is infected with the bacteria.  · Items that are contaminated with the bacteria, such as a face towel, contact lens solution, or eye makeup.  What increases the risk?  You are more likely to develop this condition if you:  · Are exposed to other people who have the infection.  · Wear contact lenses.  · Have a sinus infection.  · Have had a recent eye injury or surgery.  · Have a weak body defense system (immune system).  · Have a medical condition that causes dry eyes.  What are the signs or symptoms?  Symptoms of this condition include:  · Thick, yellowish discharge from the eye. This may turn into a crust on the eyelid overnight and cause your eyelids to stick together.  · Tearing or watery eyes.  · Itchy eyes.  · Burning feeling in your eyes.  · Eye redness.  · Swollen eyelids.  · Blurred vision.  How is this diagnosed?  This condition is diagnosed based on your symptoms and medical history. Your health care provider may also take a sample of discharge from your eye to find the cause of your infection. This is rarely done.  How is this treated?  This condition may be treated with:  · Antibiotic eye drops or ointment to clear the infection more quickly and prevent the spread of infection to others.  · Oral antibiotic medicines to treat infections that do not respond to drops or ointments or that last longer than 10 days.  · Cool, wet  cloths (cool compresses) placed on the eyes.  · Artificial tears applied 2-6 times a day.  Follow these instructions at home:  Medicines  · Take or apply your antibiotic medicine as told by your health care provider. Do not stop taking or applying the antibiotic even if you start to feel better.  · Take or apply over-the-counter and prescription medicines only as told by your health care provider.  · Be very careful to avoid touching the edge of your eyelid with the eye-drop bottle or the ointment tube when you apply medicines to the affected eye. This will keep you from spreading the infection to your other eye or to other people.  Managing discomfort  · Gently wipe away any drainage from your eye with a warm, wet washcloth or a cotton ball.  · Apply a clean, cool compress to your eye for 10-20 minutes, 3-4 times a day.  General instructions  · Do not wear contact lenses until the inflammation is gone and your health care provider says it is safe to wear them again. Ask your health care provider how to sterilize or replace your contact lenses before you use them again. Wear glasses until you can resume wearing contact lenses.  · Avoid wearing eye makeup until the inflammation is gone. Throw away any old eye cosmetics that may be contaminated.  · Change or wash your pillowcase every day.  · Do not share towels or washcloths. This may spread the infection.  · Wash your hands often with soap and water. Use paper towels to dry your hands.  · Avoid touching or rubbing your eyes.  · Do not drive or use heavy machinery if your vision is blurred.  Contact a health care provider if:  · You have a fever.  · Your symptoms do not get better after 10 days.  Get help right away if you have:  · A fever and your symptoms suddenly get worse.  · Severe pain when you move your eye.  · Facial pain, redness, or swelling.  · Sudden loss of vision.  Summary  · Bacterial conjunctivitis is an infection of the clear membrane that covers the  white part of your eye and the inner surface of your eyelid (conjunctiva).  · Bacterial conjunctivitis spreads very easily from person to person (is contagious).  · Wash your hands often with soap and water. Use paper towels to dry your hands.  · Take or apply your antibiotic medicine as told by your health care provider. Do not stop taking or applying the antibiotic even if you start to feel better.  · Contact a health care provider if you have a fever or your symptoms do not get better after 10 days.  This information is not intended to replace advice given to you by your health care provider. Make sure you discuss any questions you have with your health care provider.  Document Released: 12/18/2006 Document Revised: 07/24/2019 Document Reviewed: 07/24/2019  [employer] Interactive Patient Education © 2019 [employer]    Acute Bronchitis, Adult    Acute bronchitis is sudden (acute) swelling of the air tubes (bronchi) in the lungs. Acute bronchitis causes these tubes to fill with mucus, which can make it hard to breathe. It can also cause coughing or wheezing.  In adults, acute bronchitis usually goes away within 2 weeks. A cough caused by bronchitis may last up to 3 weeks. Smoking, allergies, and asthma can make the condition worse. Repeated episodes of bronchitis may cause further lung problems, such as chronic obstructive pulmonary disease (COPD).  What are the causes?  This condition can be caused by germs and by substances that irritate the lungs, including:  · Cold and flu viruses. This condition is most often caused by the same virus that causes a cold.  · Bacteria.  · Exposure to tobacco smoke, dust, fumes, and air pollution.  What increases the risk?  This condition is more likely to develop in people who:  · Have close contact with someone with acute bronchitis.  · Are exposed to lung irritants, such as tobacco smoke, dust, fumes, and vapors.  · Have a weak immune system.  · Have a respiratory condition  such as asthma.  What are the signs or symptoms?  Symptoms of this condition include:  · A cough.  · Coughing up clear, yellow, or green mucus.  · Wheezing.  · Chest congestion.  · Shortness of breath.  · A fever.  · Body aches.  · Chills.  · A sore throat.  How is this diagnosed?  This condition is usually diagnosed with a physical exam. During the exam, your health care provider may order tests, such as chest X-rays, to rule out other conditions. He or she may also:  · Test a sample of your mucus for bacterial infection.  · Check the level of oxygen in your blood. This is done to check for pneumonia.  · Do a chest X-ray or lung function testing to rule out pneumonia and other conditions.  · Perform blood tests.  Your health care provider will also ask about your symptoms and medical history.  How is this treated?  Most cases of acute bronchitis clear up over time without treatment. Your health care provider may recommend:  · Drinking more fluids. Drinking more makes your mucus thinner, which may make it easier to breathe.  · Taking a medicine for a fever or cough.  · Taking an antibiotic medicine.  · Using an inhaler to help improve shortness of breath and to control a cough.  · Using a cool mist vaporizer or humidifier to make it easier to breathe.  Follow these instructions at home:  Medicines  · Take over-the-counter and prescription medicines only as told by your health care provider.  · If you were prescribed an antibiotic, take it as told by your health care provider. Do not stop taking the antibiotic even if you start to feel better.  General instructions    · Get plenty of rest.  · Drink enough fluids to keep your urine pale yellow.  · Avoid smoking and secondhand smoke. Exposure to cigarette smoke or irritating chemicals will make bronchitis worse. If you smoke and you need help quitting, ask your health care provider. Quitting smoking will help your lungs heal faster.  · Use an inhaler, cool mist  vaporizer, or humidifier as told by your health care provider.  · Keep all follow-up visits as told by your health care provider. This is important.  How is this prevented?  To lower your risk of getting this condition again:  · Wash your hands often with soap and water. If soap and water are not available, use hand .  · Avoid contact with people who have cold symptoms.  · Try not to touch your hands to your mouth, nose, or eyes.  · Make sure to get the flu shot every year.  Contact a health care provider if:  · Your symptoms do not improve in 2 weeks of treatment.  Get help right away if:  · You cough up blood.  · You have chest pain.  · You have severe shortness of breath.  · You become dehydrated.  · You faint or keep feeling like you are going to faint.  · You keep vomiting.  · You have a severe headache.  · Your fever or chills gets worse.  This information is not intended to replace advice given to you by your health care provider. Make sure you discuss any questions you have with your health care provider.  Document Released: 01/25/2006 Document Revised: 08/01/2018 Document Reviewed: 06/07/2017  [employer] Interactive Patient Education © 2019 [employer]

## 2020-02-06 ENCOUNTER — OFFICE VISIT (OUTPATIENT)
Dept: RETAIL CLINIC | Facility: CLINIC | Age: 75
End: 2020-02-06

## 2020-02-06 VITALS
RESPIRATION RATE: 16 BRPM | SYSTOLIC BLOOD PRESSURE: 119 MMHG | OXYGEN SATURATION: 95 % | DIASTOLIC BLOOD PRESSURE: 80 MMHG | TEMPERATURE: 97.8 F | HEART RATE: 75 BPM

## 2020-02-06 DIAGNOSIS — J11.1 INFLUENZA: Primary | ICD-10-CM

## 2020-02-06 DIAGNOSIS — H66.001 NON-RECURRENT ACUTE SUPPURATIVE OTITIS MEDIA OF RIGHT EAR WITHOUT SPONTANEOUS RUPTURE OF TYMPANIC MEMBRANE: ICD-10-CM

## 2020-02-06 PROCEDURE — 99213 OFFICE O/P EST LOW 20 MIN: CPT | Performed by: NURSE PRACTITIONER

## 2020-02-06 RX ORDER — OSELTAMIVIR PHOSPHATE 75 MG/1
75 CAPSULE ORAL 2 TIMES DAILY
Qty: 10 CAPSULE | Refills: 0 | Status: SHIPPED | OUTPATIENT
Start: 2020-02-06 | End: 2020-02-11

## 2020-02-06 RX ORDER — CEFDINIR 300 MG/1
300 CAPSULE ORAL 2 TIMES DAILY
Qty: 20 CAPSULE | Refills: 0 | Status: SHIPPED | OUTPATIENT
Start: 2020-02-06 | End: 2020-02-16

## 2020-02-06 NOTE — PROGRESS NOTES
Subjective   Kamilla Hawthorne is a 74 y.o. female.     Influenza   This is a new problem. The current episode started yesterday. The problem occurs constantly. The problem has been unchanged. Associated symptoms include chills, congestion, coughing, a fever, headaches, myalgias and a sore throat. Pertinent negatives include no nausea or vomiting. Treatments tried: coricidian  The treatment provided no relief.        The following portions of the patient's history were reviewed and updated as appropriate: allergies, current medications, past family history, past medical history, past social history, past surgical history and problem list.    Review of Systems   Constitutional: Positive for chills and fever.   HENT: Positive for congestion and sore throat.    Eyes: Negative.    Respiratory: Positive for cough.    Cardiovascular: Negative.    Gastrointestinal: Negative.  Negative for nausea and vomiting.   Endocrine: Negative.    Genitourinary: Negative.    Musculoskeletal: Positive for myalgias.   Skin: Negative.    Allergic/Immunologic: Negative.    Hematological: Negative.    Psychiatric/Behavioral: Negative.        Objective   Physical Exam   Constitutional: She is oriented to person, place, and time. Vital signs are normal. She appears well-developed and well-nourished.   HENT:   Head: Normocephalic and atraumatic.   Right Ear: Hearing, external ear and ear canal normal. Tympanic membrane is erythematous.   Left Ear: Hearing, tympanic membrane, external ear and ear canal normal.   Nose: Nose normal. Right sinus exhibits no maxillary sinus tenderness and no frontal sinus tenderness. Left sinus exhibits no maxillary sinus tenderness and no frontal sinus tenderness.   Mouth/Throat: Uvula is midline, oropharynx is clear and moist and mucous membranes are normal. No tonsillar exudate.   Eyes: Pupils are equal, round, and reactive to light. Conjunctivae and lids are normal.   Neck: Trachea normal and normal range of  motion. Neck supple.   Cardiovascular: Normal rate, regular rhythm, S1 normal, S2 normal and normal heart sounds.   Pulmonary/Chest: Effort normal and breath sounds normal. No respiratory distress.   Abdominal: Soft. Normal appearance and bowel sounds are normal. There is no tenderness.   Musculoskeletal: Normal range of motion.   Lymphadenopathy:     She has no cervical adenopathy.   Neurological: She is alert and oriented to person, place, and time. She has normal strength.   Skin: Skin is warm, dry and intact. Turgor is normal. No rash noted.   Psychiatric: She has a normal mood and affect. Her speech is normal and behavior is normal.   Vitals reviewed.        Assessment/Plan   Kamilla was seen today for flu symptoms.    Diagnoses and all orders for this visit:    Influenza    Non-recurrent acute suppurative otitis media of right ear without spontaneous rupture of tympanic membrane    Other orders  -     oseltamivir (TAMIFLU) 75 MG capsule; Take 1 capsule by mouth 2 (Two) Times a Day for 5 days.  -     cefdinir (OMNICEF) 300 MG capsule; Take 1 capsule by mouth 2 (Two) Times a Day for 10 days.

## 2020-02-26 ENCOUNTER — OFFICE VISIT (OUTPATIENT)
Dept: INTERNAL MEDICINE | Facility: CLINIC | Age: 75
End: 2020-02-26

## 2020-02-26 VITALS
HEIGHT: 66 IN | SYSTOLIC BLOOD PRESSURE: 136 MMHG | DIASTOLIC BLOOD PRESSURE: 79 MMHG | BODY MASS INDEX: 24.59 KG/M2 | HEART RATE: 59 BPM | RESPIRATION RATE: 16 BRPM | WEIGHT: 153 LBS | TEMPERATURE: 97.7 F | OXYGEN SATURATION: 98 %

## 2020-02-26 DIAGNOSIS — Z00.00 HEALTHCARE MAINTENANCE: ICD-10-CM

## 2020-02-26 DIAGNOSIS — Z13.6 ENCOUNTER FOR LIPID SCREENING FOR CARDIOVASCULAR DISEASE: ICD-10-CM

## 2020-02-26 DIAGNOSIS — Z13.220 ENCOUNTER FOR LIPID SCREENING FOR CARDIOVASCULAR DISEASE: ICD-10-CM

## 2020-02-26 DIAGNOSIS — I10 ESSENTIAL HYPERTENSION: Primary | ICD-10-CM

## 2020-02-26 DIAGNOSIS — H65.93 FLUID LEVEL BEHIND TYMPANIC MEMBRANE OF BOTH EARS: ICD-10-CM

## 2020-02-26 PROCEDURE — 99214 OFFICE O/P EST MOD 30 MIN: CPT | Performed by: NURSE PRACTITIONER

## 2020-02-26 RX ORDER — FLUTICASONE PROPIONATE 50 MCG
2 SPRAY, SUSPENSION (ML) NASAL DAILY
Qty: 15.8 ML | Refills: 0 | Status: SHIPPED | OUTPATIENT
Start: 2020-02-26 | End: 2020-08-26

## 2020-02-27 ENCOUNTER — TELEPHONE (OUTPATIENT)
Dept: INTERNAL MEDICINE | Facility: CLINIC | Age: 75
End: 2020-02-27

## 2020-02-27 LAB
ALBUMIN SERPL-MCNC: 4.3 G/DL (ref 3.5–5.2)
ALBUMIN/GLOB SERPL: 1.5 G/DL
ALP SERPL-CCNC: 76 U/L (ref 39–117)
ALT SERPL-CCNC: 16 U/L (ref 1–33)
APPEARANCE UR: CLEAR
AST SERPL-CCNC: 15 U/L (ref 1–32)
BACTERIA #/AREA URNS HPF: NORMAL /HPF
BASOPHILS # BLD AUTO: 0.04 10*3/MM3 (ref 0–0.2)
BASOPHILS NFR BLD AUTO: 0.8 % (ref 0–1.5)
BILIRUB SERPL-MCNC: 1 MG/DL (ref 0.2–1.2)
BILIRUB UR QL STRIP: NEGATIVE
BUN SERPL-MCNC: 18 MG/DL (ref 8–23)
BUN/CREAT SERPL: 30 (ref 7–25)
CALCIUM SERPL-MCNC: 9.3 MG/DL (ref 8.6–10.5)
CHLORIDE SERPL-SCNC: 101 MMOL/L (ref 98–107)
CHOLEST SERPL-MCNC: 252 MG/DL (ref 0–200)
CO2 SERPL-SCNC: 26.5 MMOL/L (ref 22–29)
COLOR UR: YELLOW
CREAT SERPL-MCNC: 0.6 MG/DL (ref 0.57–1)
EOSINOPHIL # BLD AUTO: 0.07 10*3/MM3 (ref 0–0.4)
EOSINOPHIL NFR BLD AUTO: 1.3 % (ref 0.3–6.2)
EPI CELLS #/AREA URNS HPF: NORMAL /HPF (ref 0–10)
ERYTHROCYTE [DISTWIDTH] IN BLOOD BY AUTOMATED COUNT: 12.8 % (ref 12.3–15.4)
GLOBULIN SER CALC-MCNC: 2.8 GM/DL
GLUCOSE SERPL-MCNC: 90 MG/DL (ref 65–99)
GLUCOSE UR QL: NEGATIVE
HCT VFR BLD AUTO: 42.8 % (ref 34–46.6)
HDLC SERPL-MCNC: 88 MG/DL (ref 40–60)
HGB BLD-MCNC: 14.6 G/DL (ref 12–15.9)
HGB UR QL STRIP: NEGATIVE
IMM GRANULOCYTES # BLD AUTO: 0.01 10*3/MM3 (ref 0–0.05)
IMM GRANULOCYTES NFR BLD AUTO: 0.2 % (ref 0–0.5)
KETONES UR QL STRIP: NEGATIVE
LDLC SERPL CALC-MCNC: 148 MG/DL (ref 0–100)
LEUKOCYTE ESTERASE UR QL STRIP: NEGATIVE
LYMPHOCYTES # BLD AUTO: 2.01 10*3/MM3 (ref 0.7–3.1)
LYMPHOCYTES NFR BLD AUTO: 38.3 % (ref 19.6–45.3)
MCH RBC QN AUTO: 32.2 PG (ref 26.6–33)
MCHC RBC AUTO-ENTMCNC: 34.1 G/DL (ref 31.5–35.7)
MCV RBC AUTO: 94.5 FL (ref 79–97)
MICRO URNS: NORMAL
MICRO URNS: NORMAL
MONOCYTES # BLD AUTO: 0.4 10*3/MM3 (ref 0.1–0.9)
MONOCYTES NFR BLD AUTO: 7.6 % (ref 5–12)
NEUTROPHILS # BLD AUTO: 2.72 10*3/MM3 (ref 1.7–7)
NEUTROPHILS NFR BLD AUTO: 51.8 % (ref 42.7–76)
NITRITE UR QL STRIP: NEGATIVE
NRBC BLD AUTO-RTO: 0.2 /100 WBC (ref 0–0.2)
PH UR STRIP: 7.5 [PH] (ref 5–7.5)
PLATELET # BLD AUTO: 241 10*3/MM3 (ref 140–450)
POTASSIUM SERPL-SCNC: 4.2 MMOL/L (ref 3.5–5.2)
PROT SERPL-MCNC: 7.1 G/DL (ref 6–8.5)
PROT UR QL STRIP: NEGATIVE
RBC # BLD AUTO: 4.53 10*6/MM3 (ref 3.77–5.28)
RBC #/AREA URNS HPF: NORMAL /HPF (ref 0–2)
SODIUM SERPL-SCNC: 141 MMOL/L (ref 136–145)
SP GR UR: 1.01 (ref 1–1.03)
TRIGL SERPL-MCNC: 80 MG/DL (ref 0–150)
URINALYSIS REFLEX: NORMAL
UROBILINOGEN UR STRIP-MCNC: 0.2 MG/DL (ref 0.2–1)
VLDLC SERPL CALC-MCNC: 16 MG/DL
WBC # BLD AUTO: 5.25 10*3/MM3 (ref 3.4–10.8)
WBC #/AREA URNS HPF: NORMAL /HPF (ref 0–5)

## 2020-02-27 RX ORDER — ATORVASTATIN CALCIUM 10 MG/1
10 TABLET, FILM COATED ORAL DAILY
Qty: 30 TABLET | Refills: 1 | Status: SHIPPED | OUTPATIENT
Start: 2020-02-27 | End: 2020-03-23

## 2020-02-27 NOTE — PROGRESS NOTES
Please notify patient that blood count looks perfect with no anemia.  Metabolic panel looks stable as well.  Total cholesterol and LDL cholesterol have both risen since last check.  I would recommend watching dietary intake of cholesterol and starting atorvastatin 10 mg daily.  Please send Rx.  Urine looks clear.  Please let me know of any questions or concerns.

## 2020-02-27 NOTE — TELEPHONE ENCOUNTER
----- Message from BELLA Quiñones sent at 2/27/2020  8:01 AM EST -----  Please notify patient that blood count looks perfect with no anemia.  Metabolic panel looks stable as well.  Total cholesterol and LDL cholesterol have both risen since last check.  I would recommend watching dietary intake of cholesterol and startin  g atorvastatin 10 mg daily.  Please send Rx.  Urine looks clear.  Please let me know of any questions or concerns.

## 2020-03-23 RX ORDER — ATORVASTATIN CALCIUM 10 MG/1
TABLET, FILM COATED ORAL
Qty: 30 TABLET | Refills: 1 | Status: SHIPPED | OUTPATIENT
Start: 2020-03-23 | End: 2020-04-17

## 2020-04-17 RX ORDER — ATORVASTATIN CALCIUM 10 MG/1
TABLET, FILM COATED ORAL
Qty: 30 TABLET | Refills: 1 | Status: SHIPPED | OUTPATIENT
Start: 2020-04-17 | End: 2020-05-14

## 2020-05-14 RX ORDER — ATORVASTATIN CALCIUM 10 MG/1
TABLET, FILM COATED ORAL
Qty: 30 TABLET | Refills: 4 | Status: SHIPPED | OUTPATIENT
Start: 2020-05-14 | End: 2020-06-15

## 2020-06-15 RX ORDER — ATORVASTATIN CALCIUM 10 MG/1
TABLET, FILM COATED ORAL
Qty: 30 TABLET | Refills: 1 | Status: SHIPPED | OUTPATIENT
Start: 2020-06-15 | End: 2020-06-15

## 2020-06-15 RX ORDER — ATORVASTATIN CALCIUM 10 MG/1
TABLET, FILM COATED ORAL
Qty: 90 TABLET | Refills: 1 | Status: SHIPPED | OUTPATIENT
Start: 2020-06-15 | End: 2020-08-26 | Stop reason: SDUPTHER

## 2020-08-26 ENCOUNTER — OFFICE VISIT (OUTPATIENT)
Dept: INTERNAL MEDICINE | Facility: CLINIC | Age: 75
End: 2020-08-26

## 2020-08-26 VITALS
DIASTOLIC BLOOD PRESSURE: 82 MMHG | TEMPERATURE: 98.1 F | RESPIRATION RATE: 16 BRPM | BODY MASS INDEX: 23.91 KG/M2 | SYSTOLIC BLOOD PRESSURE: 134 MMHG | WEIGHT: 148.8 LBS | OXYGEN SATURATION: 98 % | HEART RATE: 61 BPM | HEIGHT: 66 IN

## 2020-08-26 DIAGNOSIS — Z12.11 ENCOUNTER FOR SCREENING COLONOSCOPY: ICD-10-CM

## 2020-08-26 DIAGNOSIS — Z00.00 MEDICARE ANNUAL WELLNESS VISIT, SUBSEQUENT: Primary | ICD-10-CM

## 2020-08-26 DIAGNOSIS — E78.2 MIXED HYPERLIPIDEMIA: ICD-10-CM

## 2020-08-26 DIAGNOSIS — Z85.820 HISTORY OF MELANOMA: ICD-10-CM

## 2020-08-26 DIAGNOSIS — Z86.010 HISTORY OF COLON POLYPS: ICD-10-CM

## 2020-08-26 DIAGNOSIS — I10 ESSENTIAL HYPERTENSION: ICD-10-CM

## 2020-08-26 DIAGNOSIS — Z00.00 HEALTHCARE MAINTENANCE: ICD-10-CM

## 2020-08-26 PROCEDURE — G0439 PPPS, SUBSEQ VISIT: HCPCS | Performed by: NURSE PRACTITIONER

## 2020-08-26 RX ORDER — ATORVASTATIN CALCIUM 10 MG/1
10 TABLET, FILM COATED ORAL DAILY
Qty: 90 TABLET | Refills: 1 | Status: SHIPPED | OUTPATIENT
Start: 2020-08-26 | End: 2021-03-01

## 2020-08-26 RX ORDER — LOSARTAN POTASSIUM AND HYDROCHLOROTHIAZIDE 25; 100 MG/1; MG/1
1 TABLET ORAL DAILY
Qty: 90 TABLET | Refills: 1 | Status: SHIPPED | OUTPATIENT
Start: 2020-08-26 | End: 2020-12-07

## 2020-08-26 NOTE — PROGRESS NOTES
The ABCs of the Annual Wellness Visit  Subsequent Medicare Wellness Visit    Chief Complaint   Patient presents with   • Medicare Wellness-subsequent     Pt presents here today for a medicare wellness visit.       Subjective   History of Present Illness:  Kamilla Hawthorne is a 74 y.o. female who presents for a Subsequent Medicare Wellness Visit.    HEALTH RISK ASSESSMENT    Recent Hospitalizations:  No hospitalization(s) within the last year.    Current Medical Providers:  Patient Care Team:  Roula Bishop APRN as PCP - General (Nurse Practitioner)  Ishmael Lozano Jr., MD (Dermatology)    Smoking Status:  Social History     Tobacco Use   Smoking Status Former Smoker   • Types: Cigarettes   Smokeless Tobacco Never Used       Alcohol Consumption:  Social History     Substance and Sexual Activity   Alcohol Use Yes       Depression Screen:   PHQ-2/PHQ-9 Depression Screening 8/26/2020   Little interest or pleasure in doing things 0   Feeling down, depressed, or hopeless 0   Total Score 0       Fall Risk Screen:  JUSTEN Fall Risk Assessment was completed, and patient is at LOW risk for falls.Assessment completed on:8/26/2020    Health Habits and Functional and Cognitive Screening:  Functional & Cognitive Status 8/26/2020   Do you have difficulty preparing food and eating? No   Do you have difficulty bathing yourself, getting dressed or grooming yourself? No   Do you have difficulty using the toilet? No   Do you have difficulty moving around from place to place? No   Do you have trouble with steps or getting out of a bed or a chair? No   Current Diet Well Balanced Diet   Dental Exam Up to date   Eye Exam Up to date   Exercise (times per week) 3 times per week   Current Exercise Activities Include Walking   Do you need help using the phone?  No   Are you deaf or do you have serious difficulty hearing?  No   Do you need help with transportation? No   Do you need help shopping? No   Do you need help preparing meals?  No    Do you need help with housework?  No   Do you need help with laundry? No   Do you need help taking your medications? No   Do you need help managing money? No   Do you ever drive or ride in a car without wearing a seat belt? No   Have you felt unusual stress, anger or loneliness in the last month? No   Who do you live with? Spouse   If you need help, do you have trouble finding someone available to you? No   Have you been bothered in the last four weeks by sexual problems? No   Do you have difficulty concentrating, remembering or making decisions? No         Does the patient have evidence of cognitive impairment? No    Asprin use counseling:Does not need ASA (and currently is not on it)    Age-appropriate Screening Schedule:  Refer to the list below for future screening recommendations based on patient's age, sex and/or medical conditions. Orders for these recommended tests are listed in the plan section. The patient has been provided with a written plan.    Health Maintenance   Topic Date Due   • TDAP/TD VACCINES (1 - Tdap) 12/29/1956   • INFLUENZA VACCINE  08/01/2020   • COLONOSCOPY  02/04/2021   • LIPID PANEL  02/26/2021   • MAMMOGRAM  09/16/2021   • ZOSTER VACCINE  Completed          The following portions of the patient's history were reviewed and updated as appropriate: allergies, current medications, past family history, past medical history, past social history, past surgical history and problem list.    Outpatient Medications Prior to Visit   Medication Sig Dispense Refill   • atorvastatin (LIPITOR) 10 MG tablet TAKE 1 TABLET BY MOUTH EVERY DAY 90 tablet 1   • cholecalciferol (VITAMIN D3) 1000 units tablet Take 1,000 Units by mouth Daily.     • dorzolamide-timolol (COSOPT) 22.3-6.8 MG/ML ophthalmic solution INSTILL 1 DROP BY OPHTHALMIC ROUTE 2 TIMES EVERY DAY INTO BOTH EYES  6   • latanoprost (XALATAN) 0.005 % ophthalmic solution INSTILL 1 DROP BY OPHTHALMIC ROUTE EVERY DAY BOTH EYES AT BEDTIME  6   •  losartan-hydrochlorothiazide (HYZAAR) 100-25 MG per tablet TAKE 1 TABLET BY MOUTH EVERY DAY 90 tablet 3   • vitamin C (ASCORBIC ACID) 500 MG tablet Take 500 mg by mouth Daily.     • fluticasone (FLONASE) 50 MCG/ACT nasal spray 2 sprays into the nostril(s) as directed by provider Daily. 15.8 mL 0     No facility-administered medications prior to visit.        Patient Active Problem List   Diagnosis   • Medicare annual wellness visit, subsequent   • Essential hypertension   • Multiple polyps of sigmoid colon   • Visit for screening mammogram   • Colon polyp   • FH: colon cancer   • Upper respiratory infection   • Right ear pain   • Cough   • Pink eye, left   • Subacute otitis media   • History of melanoma       Advanced Care Planning:  ACP discussion was held with the patient during this visit. Patient has an advance directive (not in EMR), copy requested.    Review of Systems   Constitutional: Negative for activity change, chills, fatigue, fever and unexpected weight change.   HENT: Negative for congestion, hearing loss, postnasal drip, sinus pressure, sinus pain, sneezing, sore throat and tinnitus.    Eyes: Negative for photophobia, pain and visual disturbance.   Respiratory: Negative for cough, chest tightness, shortness of breath and wheezing.    Cardiovascular: Negative for chest pain, palpitations and leg swelling.   Gastrointestinal: Negative for abdominal distention, abdominal pain, constipation, diarrhea, nausea and vomiting.   Endocrine: Negative for polydipsia, polyphagia and polyuria.   Genitourinary: Negative for dysuria, frequency, hematuria and urgency.   Neurological: Negative for dizziness, weakness, numbness and headaches.   All other systems reviewed and are negative.      Compared to one year ago, the patient feels her physical health is the same.  Compared to one year ago, the patient feels her mental health is the same.    Reviewed chart for potential of high risk medication in the elderly:  "yes  Reviewed chart for potential of harmful drug interactions in the elderly:yes    Objective         Vitals:    08/26/20 1258   BP: 134/82   BP Location: Right arm   Patient Position: Sitting   Cuff Size: Adult   Pulse: 61   Resp: 16   Temp: 98.1 °F (36.7 °C)   TempSrc: Oral   SpO2: 98%   Weight: 67.5 kg (148 lb 12.8 oz)   Height: 167.6 cm (66\")       Body mass index is 24.02 kg/m².  Discussed the patient's BMI with her. The BMI is above average; no BMI management plan is appropriate..    Physical Exam   Constitutional: She is oriented to person, place, and time. She appears well-developed and well-nourished. No distress.   HENT:   Head: Normocephalic and atraumatic.   Eyes: Pupils are equal, round, and reactive to light. EOM are normal.   Neck: Normal range of motion. Neck supple. No JVD present. No tracheal deviation present. No thyromegaly present.   No carotid bruits auscultated   Cardiovascular: Normal rate, regular rhythm, normal heart sounds and intact distal pulses. Exam reveals no gallop and no friction rub.   No murmur heard.  No peripheral edema.  Posterior tib pulses 2+ equal bilaterally.   Pulmonary/Chest: Effort normal and breath sounds normal. No stridor. No respiratory distress. She has no wheezes. She has no rales. She exhibits no tenderness.   Lungs CTA bilaterally   Abdominal: Soft. Bowel sounds are normal. She exhibits no distension. There is no tenderness.   Musculoskeletal: Normal range of motion.   Lymphadenopathy:     She has no cervical adenopathy.   Neurological: She is alert and oriented to person, place, and time.   Skin: Skin is warm and dry. Capillary refill takes less than 2 seconds. She is not diaphoretic.   Psychiatric: She has a normal mood and affect. Her behavior is normal. Judgment and thought content normal.   Nursing note and vitals reviewed.            Assessment/Plan   Medicare Risks and Personalized Health Plan  CMS Preventative Services Quick Reference  Advance Directive " Discussion  Breast Cancer/Mammogram Screening  Chronic Pain   Depression/Dysphoria  Fall Risk  Osteoprorosis Risk  Prostate Cancer Screening     The above risks/problems have been discussed with the patient.  Pertinent information has been shared with the patient in the After Visit Summary.  Follow up plans and orders are seen below in the Assessment/Plan Section.    Diagnoses and all orders for this visit:    1. Medicare annual wellness visit, subsequent (Primary)    2. Essential hypertension  -     CBC No Differential  -     Comprehensive metabolic panel  -     TSH Rfx On Abnormal To Free T4  -     losartan-hydrochlorothiazide (HYZAAR) 100-25 MG per tablet; Take 1 tablet by mouth Daily.  Dispense: 90 tablet; Refill: 1    3. History of colon polyps  -     Ambulatory Referral For Screening Colonoscopy    4. History of melanoma    5. Mixed hyperlipidemia  -     Lipid panel  -     atorvastatin (LIPITOR) 10 MG tablet; Take 1 tablet by mouth Daily.  Dispense: 90 tablet; Refill: 1    6. Healthcare maintenance  -     Hepatitis C antibody    7. Encounter for screening colonoscopy  -     Ambulatory Referral For Screening Colonoscopy      Follow Up:  Return in about 6 months (around 2/26/2021).     An After Visit Summary and PPPS were given to the patient.     -Annual Medicare wellness visit, subsequent performed.  Discussed healthcare maintenance.  One-time hep C screen today.    -Hypertension: Well controlled today at 134/82.  Endorsed routine home blood pressure monitoring 3-4 times weekly for goal of less than 130/80.  Continue losartan-HCTZ 100-25 mg daily.    -History of colon polyps: She will be due for screening colonoscopy with Dr. Rothman in February 2021 and I ordered referral for the colonoscopy.  History of multiple colon polyps.    -History of melanoma: Routine skin checks with Dr. Lozano, dermatology.  She checks her skin periodically and has seen no changes.    -Hyperlipidemia: Continue atorvastatin 10 mg  daily.  Lipid panel today we will adjust therapy if needed based on labs.    We will contact patient with her lab results and any further recommendations for follow-up PRN and I will see her back in 6 months for routine health maintenance/follow-up on chronic conditions.

## 2020-08-26 NOTE — PATIENT INSTRUCTIONS
Medicare Wellness  Personal Prevention Plan of Service     Date of Office Visit:  2020  Encounter Provider:  BELLA Quiñones  Place of Service:  [facility]  Patient Name: Kamilla Hawthorne  :  1945    As part of the Medicare Wellness portion of your visit today, we are providing you with this personalized preventive plan of services (PPPS). This plan is based upon recommendations of the [place] Preventive Services Task Force (USPSTF) and the Advisory Committee on Immunization Practices (ACIP).    This lists the preventive care services that should be considered, and provides dates of when you are due. Items listed as completed are up-to-date and do not require any further intervention.    Health Maintenance   Topic Date Due   • TDAP/TD VACCINES (1 - Tdap) 1956   • INFLUENZA VACCINE  2020   • COLONOSCOPY  2021   • LIPID PANEL  2021   • MEDICARE ANNUAL WELLNESS  2021   • MAMMOGRAM  2021   • Pneumococcal Vaccine Once at 65 Years Old  Completed   • ZOSTER VACCINE  Completed   • HEPATITIS C SCREENING  Addressed       Orders Placed This Encounter   Procedures   • CBC No Differential   • Comprehensive metabolic panel   • Lipid panel   • TSH Rfx On Abnormal To Free T4   • Hepatitis C antibody   • Ambulatory Referral For Screening Colonoscopy     Referral Priority:   Routine     Referral Type:   Diagnostic Medical     Referral Reason:   Specialty Services Required     Referred to Provider:   Suhail Rothman MD     Number of Visits Requested:   1       No follow-ups on file.

## 2020-08-27 LAB
ALBUMIN SERPL-MCNC: 4.7 G/DL (ref 3.5–5.2)
ALBUMIN/GLOB SERPL: 2.2 G/DL
ALP SERPL-CCNC: 74 U/L (ref 39–117)
ALT SERPL-CCNC: 22 U/L (ref 1–33)
AST SERPL-CCNC: 22 U/L (ref 1–32)
BILIRUB SERPL-MCNC: 1.2 MG/DL (ref 0–1.2)
BUN SERPL-MCNC: 15 MG/DL (ref 8–23)
BUN/CREAT SERPL: 22.1 (ref 7–25)
CALCIUM SERPL-MCNC: 9.2 MG/DL (ref 8.6–10.5)
CHLORIDE SERPL-SCNC: 101 MMOL/L (ref 98–107)
CHOLEST SERPL-MCNC: 169 MG/DL (ref 0–200)
CO2 SERPL-SCNC: 28.9 MMOL/L (ref 22–29)
CREAT SERPL-MCNC: 0.68 MG/DL (ref 0.57–1)
ERYTHROCYTE [DISTWIDTH] IN BLOOD BY AUTOMATED COUNT: 13.1 % (ref 12.3–15.4)
GLOBULIN SER CALC-MCNC: 2.1 GM/DL
GLUCOSE SERPL-MCNC: 83 MG/DL (ref 65–99)
HCT VFR BLD AUTO: 45.3 % (ref 34–46.6)
HCV AB S/CO SERPL IA: <0.1 S/CO RATIO (ref 0–0.9)
HDLC SERPL-MCNC: 83 MG/DL (ref 40–60)
HGB BLD-MCNC: 14.8 G/DL (ref 12–15.9)
LDLC SERPL CALC-MCNC: 75 MG/DL (ref 0–100)
MCH RBC QN AUTO: 31.2 PG (ref 26.6–33)
MCHC RBC AUTO-ENTMCNC: 32.7 G/DL (ref 31.5–35.7)
MCV RBC AUTO: 95.6 FL (ref 79–97)
PLATELET # BLD AUTO: 264 10*3/MM3 (ref 140–450)
POTASSIUM SERPL-SCNC: 4 MMOL/L (ref 3.5–5.2)
PROT SERPL-MCNC: 6.8 G/DL (ref 6–8.5)
RBC # BLD AUTO: 4.74 10*6/MM3 (ref 3.77–5.28)
SODIUM SERPL-SCNC: 141 MMOL/L (ref 136–145)
TRIGL SERPL-MCNC: 55 MG/DL (ref 0–150)
TSH SERPL DL<=0.005 MIU/L-ACNC: 1.62 UIU/ML (ref 0.27–4.2)
VLDLC SERPL CALC-MCNC: 11 MG/DL
WBC # BLD AUTO: 5.83 10*3/MM3 (ref 3.4–10.8)

## 2020-08-27 NOTE — PROGRESS NOTES
Please notify patient that blood count is stable with no anemia.  Metabolic panel looks perfect including kidney, liver function and electrolytes.  Cholesterol panel looks fantastic, greatly improved since last check and I would continue with current atorvastatin dosage.  Thyroid looks normal.

## 2020-11-10 DIAGNOSIS — H65.93 FLUID LEVEL BEHIND TYMPANIC MEMBRANE OF BOTH EARS: ICD-10-CM

## 2020-11-11 RX ORDER — FLUTICASONE PROPIONATE 50 MCG
SPRAY, SUSPENSION (ML) NASAL
Qty: 16 ML | Refills: 2 | Status: SHIPPED | OUTPATIENT
Start: 2020-11-11 | End: 2020-11-17

## 2020-11-17 ENCOUNTER — TELEPHONE (OUTPATIENT)
Dept: GASTROENTEROLOGY | Facility: CLINIC | Age: 75
End: 2020-11-17

## 2020-11-17 RX ORDER — LANOLIN ALCOHOL/MO/W.PET/CERES
3 CREAM (GRAM) TOPICAL NIGHTLY
COMMUNITY
End: 2023-03-09

## 2020-11-17 NOTE — TELEPHONE ENCOUNTER
Received OA paperwork through the mail.  Scanned into media and forwarded to Dr Rothman to place order.

## 2020-11-20 ENCOUNTER — PREP FOR SURGERY (OUTPATIENT)
Dept: OTHER | Facility: HOSPITAL | Age: 75
End: 2020-11-20

## 2020-11-20 DIAGNOSIS — K63.5 COLON POLYPS: Primary | ICD-10-CM

## 2020-11-20 DIAGNOSIS — Z80.0 FAMILY HISTORY OF COLON CANCER: ICD-10-CM

## 2020-11-30 ENCOUNTER — OFFICE VISIT (OUTPATIENT)
Dept: INTERNAL MEDICINE | Facility: CLINIC | Age: 75
End: 2020-11-30

## 2020-11-30 VITALS
TEMPERATURE: 97.8 F | RESPIRATION RATE: 15 BRPM | OXYGEN SATURATION: 97 % | BODY MASS INDEX: 24.11 KG/M2 | WEIGHT: 150 LBS | HEIGHT: 66 IN | SYSTOLIC BLOOD PRESSURE: 140 MMHG | HEART RATE: 67 BPM | DIASTOLIC BLOOD PRESSURE: 82 MMHG

## 2020-11-30 DIAGNOSIS — E78.2 MIXED HYPERLIPIDEMIA: ICD-10-CM

## 2020-11-30 DIAGNOSIS — I95.9 TRANSIENT HYPOTENSION: Primary | ICD-10-CM

## 2020-11-30 PROCEDURE — 99213 OFFICE O/P EST LOW 20 MIN: CPT | Performed by: NURSE PRACTITIONER

## 2020-11-30 NOTE — PROGRESS NOTES
"Subjective   Kamilla Hawthorne is a 74 y.o. female.   Chief Complaint   Patient presents with   • Hypotension     Pt presents here today for a follow up with low blood pressure.       Patient presents for evaluation of hypotensive episode. This is a 74 YOF. Last night she was standing at her stove cooking when she began to feel lightheaded and had to be lowered to the ground by her . Her daughter came over and took her BP three times with readings of 79/58, 82/59 and 87/59. Did not lose consciousness.  told her that when he helped her up, her \"neck seemed a little sweaty.\" She drank some gatorade and gradually began to feel better. She denied chest pain or SOA preceding, during or after the episode. She held her losartan-HCTZ this morning (normally she takes this daily, 100-25 mg). States that leading up to the incident she had not had much to eat or drink that day and had just finished a glass of red wine. She has not had any further symptoms today and BP is now 140/82. She denies development of any other new issues today.       The following portions of the patient's history were reviewed and updated as appropriate: allergies, current medications, past family history, past medical history, past social history, past surgical history and problem list.    Review of Systems   Constitutional: Negative for activity change, chills, fatigue, fever, unexpected weight gain and unexpected weight loss.   HENT: Negative for congestion, hearing loss, postnasal drip, sinus pressure, sneezing, sore throat, swollen glands and tinnitus.    Eyes: Negative for photophobia, pain and visual disturbance.   Respiratory: Negative for cough, chest tightness, shortness of breath and wheezing.    Cardiovascular: Negative for chest pain, palpitations and leg swelling.   Gastrointestinal: Negative for abdominal distention, abdominal pain, constipation, diarrhea, nausea and vomiting.   Endocrine: Negative for polydipsia, polyphagia " "and polyuria.   Genitourinary: Negative for dysuria, frequency, hematuria and urgency.   Neurological: Negative for dizziness, weakness, numbness and headache.   All other systems reviewed and are negative.      Objective    /82 (BP Location: Left arm, Patient Position: Sitting, Cuff Size: Adult)   Pulse 67   Temp 97.8 °F (36.6 °C)   Resp 15   Ht 167.6 cm (66\")   Wt 68 kg (150 lb)   LMP  (LMP Unknown)   SpO2 97%   BMI 24.21 kg/m²     Physical Exam  Vitals signs and nursing note reviewed.   Constitutional:       General: She is not in acute distress.     Appearance: Normal appearance. She is well-developed. She is not ill-appearing, toxic-appearing or diaphoretic.   HENT:      Head: Normocephalic and atraumatic.   Eyes:      Pupils: Pupils are equal, round, and reactive to light.   Neck:      Musculoskeletal: Normal range of motion and neck supple. No neck rigidity or muscular tenderness.      Vascular: No carotid bruit.   Cardiovascular:      Rate and Rhythm: Normal rate and regular rhythm.      Pulses: Normal pulses.      Heart sounds: Normal heart sounds.      Comments: No peripheral edema  Pulmonary:      Effort: Pulmonary effort is normal. No respiratory distress.      Breath sounds: Normal breath sounds. No stridor. No wheezing, rhonchi or rales.   Chest:      Chest wall: No tenderness.   Abdominal:      General: Bowel sounds are normal. There is no distension.      Palpations: Abdomen is soft. There is no mass.      Tenderness: There is no abdominal tenderness. There is no right CVA tenderness, left CVA tenderness, guarding or rebound.      Hernia: No hernia is present.   Musculoskeletal: Normal range of motion.   Lymphadenopathy:      Cervical: No cervical adenopathy.   Skin:     General: Skin is warm and dry.      Capillary Refill: Capillary refill takes less than 2 seconds.   Neurological:      General: No focal deficit present.      Mental Status: She is alert and oriented to person, place, " and time. Mental status is at baseline.      Comments: , pushes, pulls equal to bilateral upper and lower extremities.  No focal neurologic deficit.   Psychiatric:         Mood and Affect: Mood normal.         Behavior: Behavior normal.         Thought Content: Thought content normal.         Judgment: Judgment normal.       Current outpatient and discharge medications have been reconciled for the patient.  Reviewed by: BELLA Quiñones      Assessment/Plan   Diagnoses and all orders for this visit:    1. Transient hypotension (Primary)  -     CBC No Differential  -     Basic Metabolic Panel  -     TSH Rfx On Abnormal To Free T4    2. Mixed hyperlipidemia  -     TSH Rfx On Abnormal To Free T4    -Transient hypotensive episode likely related to hypovolemia, worsened by vasodilation from the red wine. She is encouraged to increase water intake/pedialyte and hold off on losartan-HCTZ.     -She will log BID blood pressures over the next week while holding off on losartan-HCTZ.     -One week follow up on home Bps.    -Home BP cuff was checked against manual reading here and is accurate.     -Follow up PRN in the meantime.

## 2020-11-30 NOTE — PATIENT INSTRUCTIONS
Please check your blood pressure at home twice daily (once in the morning and once in the evening) and record readings in a log. Please hold off on taking the losartan-hydrochlorothiazide for the next one week and ensure you are drinking plenty of water throughout the day.

## 2020-12-01 LAB
BUN SERPL-MCNC: 19 MG/DL (ref 8–23)
BUN/CREAT SERPL: 27.5 (ref 7–25)
CALCIUM SERPL-MCNC: 9.1 MG/DL (ref 8.6–10.5)
CHLORIDE SERPL-SCNC: 97 MMOL/L (ref 98–107)
CO2 SERPL-SCNC: 29.2 MMOL/L (ref 22–29)
CREAT SERPL-MCNC: 0.69 MG/DL (ref 0.57–1)
ERYTHROCYTE [DISTWIDTH] IN BLOOD BY AUTOMATED COUNT: 12.6 % (ref 12.3–15.4)
GLUCOSE SERPL-MCNC: 83 MG/DL (ref 65–99)
HCT VFR BLD AUTO: 40.2 % (ref 34–46.6)
HGB BLD-MCNC: 13.6 G/DL (ref 12–15.9)
MCH RBC QN AUTO: 31.3 PG (ref 26.6–33)
MCHC RBC AUTO-ENTMCNC: 33.8 G/DL (ref 31.5–35.7)
MCV RBC AUTO: 92.6 FL (ref 79–97)
PLATELET # BLD AUTO: 256 10*3/MM3 (ref 140–450)
POTASSIUM SERPL-SCNC: 3.9 MMOL/L (ref 3.5–5.2)
RBC # BLD AUTO: 4.34 10*6/MM3 (ref 3.77–5.28)
SODIUM SERPL-SCNC: 136 MMOL/L (ref 136–145)
TSH SERPL DL<=0.005 MIU/L-ACNC: 1.48 UIU/ML (ref 0.27–4.2)
WBC # BLD AUTO: 6.64 10*3/MM3 (ref 3.4–10.8)

## 2020-12-02 PROBLEM — Z80.0 FAMILY HISTORY OF COLON CANCER: Status: ACTIVE | Noted: 2020-12-02

## 2020-12-02 PROBLEM — K63.5 COLON POLYPS: Status: ACTIVE | Noted: 2020-12-02

## 2020-12-04 ENCOUNTER — TRANSCRIBE ORDERS (OUTPATIENT)
Dept: ADMINISTRATIVE | Facility: HOSPITAL | Age: 75
End: 2020-12-04

## 2020-12-04 DIAGNOSIS — Z12.31 VISIT FOR SCREENING MAMMOGRAM: Primary | ICD-10-CM

## 2020-12-07 ENCOUNTER — TELEPHONE (OUTPATIENT)
Dept: INTERNAL MEDICINE | Facility: CLINIC | Age: 75
End: 2020-12-07

## 2020-12-07 ENCOUNTER — OFFICE VISIT (OUTPATIENT)
Dept: INTERNAL MEDICINE | Facility: CLINIC | Age: 75
End: 2020-12-07

## 2020-12-07 VITALS — DIASTOLIC BLOOD PRESSURE: 70 MMHG | SYSTOLIC BLOOD PRESSURE: 130 MMHG

## 2020-12-07 DIAGNOSIS — I10 ESSENTIAL HYPERTENSION: Primary | ICD-10-CM

## 2020-12-07 PROCEDURE — 99442 PR PHYS/QHP TELEPHONE EVALUATION 11-20 MIN: CPT | Performed by: NURSE PRACTITIONER

## 2020-12-07 NOTE — PROGRESS NOTES
Subjective   Kamilla Hawthorne is a 74 y.o. female.   Chief Complaint   Patient presents with   • Hypertension     1 week f/u     You have chosen to receive care through a telephone visit. Do you consent to use a telephone visit for your medical care today? Yes    Patient presents via telephone for 1 week follow-up on blood pressure.  This is a 74-year-old female.  I saw her in the office last week on 11/30/2020.  She had had a recent near syncopal episode in which her blood pressure had dropped.  After HPI it was determined that this was likely multifactorial from dehydration/low volume and wine that she had been drinking.  She had been having some periodic low blood pressures afterward as well.  I discontinued her losartan-hydrochlorothiazide tentatively and asked her to hydrate fully throughout the day with water.  She presents today with her blood pressure averages from the past week after being off of the losartan-hydrochlorothiazide.  She has had no further near syncopal episodes and states that she is feeling very well.  Her blood pressure, off of the losartan-hydrochlorothiazide has averaged 130s over 70s.  No headaches, visual changes, shortness of breath or chest discomfort.  States that she is feeling very well.  Denies development of any other new issues today.       The following portions of the patient's history were reviewed and updated as appropriate: allergies, current medications, past family history, past medical history, past social history, past surgical history and problem list.    Review of Systems   Constitutional: Negative for activity change, chills, fatigue, fever, unexpected weight gain and unexpected weight loss.   HENT: Negative for congestion, hearing loss, postnasal drip, sinus pressure, sneezing, sore throat, swollen glands and tinnitus.    Eyes: Negative for photophobia, pain and visual disturbance.   Respiratory: Negative for cough, chest tightness, shortness of breath and wheezing.     Cardiovascular: Negative for chest pain, palpitations and leg swelling.   Gastrointestinal: Negative for abdominal distention, abdominal pain, constipation, diarrhea, nausea and vomiting.   Endocrine: Negative for polydipsia, polyphagia and polyuria.   Genitourinary: Negative for dysuria, frequency, hematuria and urgency.   Neurological: Negative for dizziness, weakness, numbness and headache.   All other systems reviewed and are negative.      Objective    Vitals:    12/07/20 1117   BP: 130/70   Home monitor    Physical Exam  Neurological:      Mental Status: She is oriented to person, place, and time.       Current outpatient and discharge medications have been reconciled for the patient.  Reviewed by: BELLA Quiñones      Assessment/Plan   Diagnoses and all orders for this visit:    1. Essential hypertension (Primary)      -Hypertension: No further near syncopal episodes.  Continue without the losartan-hydrochlorothiazide 100-25 mg.  She will continue to monitor daily blood pressure readings.  If averages go above 130/80 she will notify me and if needed, we can get the losartan mg daily added back at a lower dose without the hydrochlorothiazide.    -Follow-up as needed and I will see her back in February 2021 per previously scheduled appointment.    This visit was conducted via phone therefore no physical exam was performed.    This visit has been rescheduled as a phone visit to comply with patient safety concerns in accordance with CDC recommendations. Total time of discussion was 15 minutes.

## 2020-12-07 NOTE — TELEPHONE ENCOUNTER
----- Message from BELLA Quiñones sent at 12/7/2020 11:14 AM EST -----  Regarding: pharmacy  I have permanently discontinued this patient's losartan-hydrochlorothiazide due to some low readings she has been having.  Can you please let her pharmacy know that I discontinued this so that they do not continue with automatic refill requests?  Thank you so much

## 2020-12-29 ENCOUNTER — TELEPHONE (OUTPATIENT)
Dept: INTERNAL MEDICINE | Facility: CLINIC | Age: 75
End: 2020-12-29

## 2020-12-29 DIAGNOSIS — I10 ESSENTIAL HYPERTENSION: Primary | ICD-10-CM

## 2020-12-29 RX ORDER — LOSARTAN POTASSIUM 50 MG/1
50 TABLET ORAL DAILY
Qty: 90 TABLET | Refills: 1 | Status: SHIPPED | OUTPATIENT
Start: 2020-12-29 | End: 2021-06-28

## 2020-12-29 NOTE — TELEPHONE ENCOUNTER
Patient called stating Roula BLANCO advised her to hold her BP medication and continue to monitor her blood pressure. Patient states on average BP is     AM: 152/87  /80    Blood pressure is always higher in the morning. Patient has appointment scheduled for Feb 2021, should she be sooner or continue to monitor BP and report readings or start back on medication. Please advise       Patient contact [phone]

## 2020-12-29 NOTE — TELEPHONE ENCOUNTER
Let's add back just part of the losartan, 50 mg daily (previously was taking losartan 100 mg daily with hydrochlorothiazide combination).  I sent in losartan 50 mg tablets to her pharmacy.  Please take these once daily in the morning.  Please continue to monitor her a.m. and p.m. blood pressure readings.  Can we make her a 2-week telephone visit for follow-up on this addition?

## 2021-01-13 ENCOUNTER — OFFICE VISIT (OUTPATIENT)
Dept: INTERNAL MEDICINE | Facility: CLINIC | Age: 76
End: 2021-01-13

## 2021-01-13 DIAGNOSIS — I10 ESSENTIAL HYPERTENSION: Primary | ICD-10-CM

## 2021-01-13 PROCEDURE — 99442 PR PHYS/QHP TELEPHONE EVALUATION 11-20 MIN: CPT | Performed by: NURSE PRACTITIONER

## 2021-01-13 NOTE — ASSESSMENT & PLAN NOTE
Blood pressures have improved with the addition of losartan 50 mg daily.  They were a bit labile at times.  We will have her continue losartan 50 mg daily.  If she sees systolic readings less than 110 or diastolic readings less than 65 she will take 1/2 tablet for 25 mg daily instead.  She will continue monitoring blood pressures.  Continue low-sodium diet.

## 2021-01-13 NOTE — PROGRESS NOTES
Chief Complaint  Hypertension    Subjective          You have chosen to receive care through a telephone visit. Do you consent to use a telephone visit for your medical care today? Yes    Kamilla Hawthorne presents to [facility] for   History of Present Illness     Patient presents for evaluation of hypertension.  This is a 75-year-old female who presents via telephone for her visit.  On 12/29/2020 she called the office complaining of high blood pressures in the morning, systolic readings into the 150s.  We had previously taken her off of losartan-hydrochlorothiazide 100-25 mg daily due to hypotension.  At that time I added back 50 mg of losartan daily and she has been keeping track of blood pressures 3 times per day.  She presents today via telephone for follow-up on these readings.  Her morning blood pressure readings are averaging in the 130s over 80s now.  Midday readings are averaging in the 120s over 70s and evening readings are averaging in the 130s over 70s.  No headaches, visual changes, shortness of breath or chest discomfort.  Over the past 3 to 4 days she has had a couple of instances of systolic blood pressures in the 100s to 1 teens over 60s.  She becomes slightly lightheaded when this happens.  She wonders whether, on these occasions that she should take less of the medication.  Denies development of any other new issues today.    Objective   Vital Signs:   There were no vitals taken for this visit.    Physical Exam  Neurological:      Mental Status: She is oriented to person, place, and time.        Result Review :   The following data was reviewed by: BELLA Quiñones on 01/13/2021:  Common labs    Common Labsle 2/26/20 2/26/20 2/26/20 8/26/20 8/26/20 8/26/20 11/30/20 11/30/20    1200 1200 1200 1358 1358 1358 0430 0430   Glucose  90   83   83   BUN  18   15   19   Creatinine  0.60   0.68   0.69   eGFR Non  Am  98   85   83   eGFR  Am  118   103   101    Sodium  141   141   136   Potassium  4.2   4.0   3.9   Chloride  101   101   97 (A)   Calcium  9.3   9.2   9.1   Total Protein  7.1   6.8      Albumin  4.30   4.70      Total Bilirubin  1.0   1.2      Alkaline Phosphatase  76   74      AST (SGOT)  15   22      ALT (SGPT)  16   22      WBC 5.25   5.83   6.64    Hemoglobin 14.6   14.8   13.6    Hematocrit 42.8   45.3   40.2    Platelets 241   264   256    Total Cholesterol   252 (A)   169     Triglycerides   80   55     HDL Cholesterol   88 (A)   83 (A)     LDL Cholesterol    148 (A)   75     (A) Abnormal value       Comments are available for some flowsheets but are not being displayed.           Current outpatient and discharge medications have been reconciled for the patient.  Reviewed by: BELLA Quiñones           Assessment and Plan    Problem List Items Addressed This Visit        Cardiac and Vasculature    Essential hypertension - Primary (Chronic)    Current Assessment & Plan     Blood pressures have improved with the addition of losartan 50 mg daily.  They were a bit labile at times.  We will have her continue losartan 50 mg daily.  If she sees systolic readings less than 110 or diastolic readings less than 65 she will take 1/2 tablet for 25 mg daily instead.  She will continue monitoring blood pressures.  Continue low-sodium diet.             Follow-up as needed if symptoms persist or worsen and I will see her back at her next scheduled office visit next month.    This visit was conducted via phone therefore no physical exam was performed.    This visit has been rescheduled as a phone visit to comply with patient safety concerns in accordance with CDC recommendations. Total time of discussion was 16 minutes.      Follow Up   No follow-ups on file.  Patient was given instructions and counseling regarding her condition or for health maintenance advice. Please see specific information pulled into the AVS if appropriate.

## 2021-01-27 ENCOUNTER — IMMUNIZATION (OUTPATIENT)
Dept: VACCINE CLINIC | Facility: HOSPITAL | Age: 76
End: 2021-01-27

## 2021-01-27 ENCOUNTER — TRANSCRIBE ORDERS (OUTPATIENT)
Dept: LAB | Facility: HOSPITAL | Age: 76
End: 2021-01-27

## 2021-01-27 DIAGNOSIS — Z01.818 OTHER SPECIFIED PRE-OPERATIVE EXAMINATION: Primary | ICD-10-CM

## 2021-01-27 PROCEDURE — 91300 HC SARSCOV02 VAC 30MCG/0.3ML IM: CPT | Performed by: INTERNAL MEDICINE

## 2021-01-27 PROCEDURE — 0001A: CPT | Performed by: INTERNAL MEDICINE

## 2021-02-06 ENCOUNTER — LAB (OUTPATIENT)
Dept: LAB | Facility: HOSPITAL | Age: 76
End: 2021-02-06

## 2021-02-06 DIAGNOSIS — Z01.818 OTHER SPECIFIED PRE-OPERATIVE EXAMINATION: ICD-10-CM

## 2021-02-06 PROCEDURE — U0004 COV-19 TEST NON-CDC HGH THRU: HCPCS

## 2021-02-06 PROCEDURE — C9803 HOPD COVID-19 SPEC COLLECT: HCPCS

## 2021-02-08 LAB — SARS-COV-2 RNA RESP QL NAA+PROBE: NOT DETECTED

## 2021-02-09 ENCOUNTER — ANESTHESIA EVENT (OUTPATIENT)
Dept: GASTROENTEROLOGY | Facility: HOSPITAL | Age: 76
End: 2021-02-09

## 2021-02-09 ENCOUNTER — ANESTHESIA (OUTPATIENT)
Dept: GASTROENTEROLOGY | Facility: HOSPITAL | Age: 76
End: 2021-02-09

## 2021-02-09 ENCOUNTER — HOSPITAL ENCOUNTER (OUTPATIENT)
Facility: HOSPITAL | Age: 76
Setting detail: HOSPITAL OUTPATIENT SURGERY
Discharge: HOME | End: 2021-02-09
Attending: INTERNAL MEDICINE | Admitting: INTERNAL MEDICINE

## 2021-02-09 VITALS
HEIGHT: 66 IN | DIASTOLIC BLOOD PRESSURE: 78 MMHG | WEIGHT: 140 LBS | TEMPERATURE: 97.6 F | HEART RATE: 59 BPM | SYSTOLIC BLOOD PRESSURE: 153 MMHG | BODY MASS INDEX: 22.5 KG/M2 | OXYGEN SATURATION: 97 % | RESPIRATION RATE: 16 BRPM

## 2021-02-09 DIAGNOSIS — K63.5 COLON POLYPS: ICD-10-CM

## 2021-02-09 DIAGNOSIS — Z80.0 FAMILY HISTORY OF COLON CANCER: ICD-10-CM

## 2021-02-09 PROCEDURE — 88305 TISSUE EXAM BY PATHOLOGIST: CPT | Performed by: INTERNAL MEDICINE

## 2021-02-09 PROCEDURE — 25010000002 PROPOFOL 10 MG/ML EMULSION: Performed by: ANESTHESIOLOGY

## 2021-02-09 PROCEDURE — 45380 COLONOSCOPY AND BIOPSY: CPT | Performed by: INTERNAL MEDICINE

## 2021-02-09 RX ORDER — PROPOFOL 10 MG/ML
VIAL (ML) INTRAVENOUS AS NEEDED
Status: DISCONTINUED | OUTPATIENT
Start: 2021-02-09 | End: 2021-02-09 | Stop reason: SURG

## 2021-02-09 RX ORDER — LIDOCAINE HYDROCHLORIDE 20 MG/ML
INJECTION, SOLUTION INFILTRATION; PERINEURAL AS NEEDED
Status: DISCONTINUED | OUTPATIENT
Start: 2021-02-09 | End: 2021-02-09 | Stop reason: SURG

## 2021-02-09 RX ORDER — SODIUM CHLORIDE, SODIUM LACTATE, POTASSIUM CHLORIDE, CALCIUM CHLORIDE 600; 310; 30; 20 MG/100ML; MG/100ML; MG/100ML; MG/100ML
1000 INJECTION, SOLUTION INTRAVENOUS CONTINUOUS
Status: DISCONTINUED | OUTPATIENT
Start: 2021-02-09 | End: 2021-02-09 | Stop reason: HOSPADM

## 2021-02-09 RX ORDER — SODIUM CHLORIDE 0.9 % (FLUSH) 0.9 %
10 SYRINGE (ML) INJECTION AS NEEDED
Status: DISCONTINUED | OUTPATIENT
Start: 2021-02-09 | End: 2021-02-09 | Stop reason: HOSPADM

## 2021-02-09 RX ADMIN — SODIUM CHLORIDE, POTASSIUM CHLORIDE, SODIUM LACTATE AND CALCIUM CHLORIDE 1000 ML: 600; 310; 30; 20 INJECTION, SOLUTION INTRAVENOUS at 08:08

## 2021-02-09 RX ADMIN — LIDOCAINE HYDROCHLORIDE 75 MG: 20 INJECTION, SOLUTION INFILTRATION; PERINEURAL at 08:45

## 2021-02-09 RX ADMIN — PROPOFOL 300 MG: 10 INJECTION, EMULSION INTRAVENOUS at 08:45

## 2021-02-09 NOTE — ANESTHESIA POSTPROCEDURE EVALUATION
Patient: Kamilla Hawthorne    Procedure Summary     Date: 02/09/21 Room / [facility]:  [facility]    Anesthesia Start: 0843 Anesthesia Stop: 0914    Procedure: COLONOSCOPY TO CECUM/TI WITH COLD BX POLYPECTOMIES (N/A ) Diagnosis:       Colon polyps      Family history of colon cancer      (Colon polyps [K63.5])      (Family history of colon cancer [Z80.0])    Surgeon: Suhail Rothman MD Provider: Estuardo Summers MD    Anesthesia Type: MAC ASA Status: 2          Anesthesia Type: MAC    Vitals  Vitals Value Taken Time   /78 02/09/21 0934   Temp     Pulse 59 02/09/21 0934   Resp 16 02/09/21 0934   SpO2 97 % 02/09/21 0934           Post Anesthesia Care and Evaluation    Patient location during evaluation: PHASE II  Patient participation: complete - patient participated  Level of consciousness: awake and alert  Pain management: adequate  Airway patency: patent  Anesthetic complications: No anesthetic complications  PONV Status: none  Cardiovascular status: acceptable and hemodynamically stable  Respiratory status: acceptable  Hydration status: acceptable

## 2021-02-09 NOTE — ANESTHESIA PREPROCEDURE EVALUATION
Anesthesia Evaluation     Patient summary reviewed and Nursing notes reviewed   NPO Solid Status: > 8 hours  NPO Liquid Status: > 4 hours           Airway   Mallampati: II  TM distance: >3 FB  Neck ROM: full  no difficulty expected  Dental - normal exam     Pulmonary - normal exam   (+) recent URI,   Cardiovascular - normal exam    (+) hypertension,       Neuro/Psych  GI/Hepatic/Renal/Endo      Musculoskeletal     Abdominal  - normal exam   Substance History      OB/GYN          Other      history of cancer remission                    Anesthesia Plan    ASA 2     MAC       Anesthetic plan, all risks, benefits, and alternatives have been provided, discussed and informed consent has been obtained with: patient.

## 2021-02-09 NOTE — H&P
[facility]  Pre Procedure History & Physical    Chief Complaint:   Time for my colonoscopy    Subjective     HPI:   75 y.o. female.  He has a history of colon polyps.  She also has a family history of colorectal cancer in grandfather and nephew had colon cancer.  Her last colonoscopy was in 2 of 2019.    Past Medical History:   Past Medical History:   Diagnosis Date   • Colon polyp    • Family history of colon cancer    • Glaucoma    • Hypertension    • Melanoma (CMS/HCC)        Family History:  Family History   Problem Relation Age of Onset   • Emphysema Mother    • Stroke Father    • Malig Hyperthermia Neg Hx        Social History:   reports that she has quit smoking. Her smoking use included cigarettes. She has never used smokeless tobacco. She reports current alcohol use. She reports that she does not use drugs.    Medications:   Medications Prior to Admission   Medication Sig Dispense Refill Last Dose   • atorvastatin (LIPITOR) 10 MG tablet Take 1 tablet by mouth Daily. 90 tablet 1 Past Week at Unknown time   • cholecalciferol (VITAMIN D3) 1000 units tablet Take 1,000 Units by mouth Daily.   Past Week at Unknown time   • dorzolamide-timolol (COSOPT) 22.3-6.8 MG/ML ophthalmic solution INSTILL 1 DROP BY OPHTHALMIC ROUTE 2 TIMES EVERY DAY INTO BOTH EYES  6 2/9/2021 at Unknown time   • latanoprost (XALATAN) 0.005 % ophthalmic solution INSTILL 1 DROP BY OPHTHALMIC ROUTE EVERY DAY BOTH EYES AT BEDTIME  6 2/8/2021 at Unknown time   • losartan (COZAAR) 50 MG tablet Take 1 tablet by mouth Daily. 90 tablet 1 2/9/2021 at Unknown time   • melatonin 3 MG tablet Take 3 mg by mouth Every Night.   Past Week at Unknown time   • vitamin C (ASCORBIC ACID) 500 MG tablet Take 500 mg by mouth Daily.   Past Week at Unknown time       Allergies:  Penicillins    ROS:    Pertinent items are noted in HPI     Objective     Blood pressure 168/81, pulse 59, temperature 97.6 °F (36.4 °C), temperature source Oral,  "resp. rate 16, height 167.6 cm (66\"), weight 63.5 kg (140 lb), SpO2 100 %, not currently breastfeeding.    Physical Exam   Constitutional: Pt is oriented to person, place, and time and well-developed, well-nourished, and in no distress.   HENT:   Mouth/Throat: Oropharynx is clear and moist.   Neck: Normal range of motion. Neck supple.   Cardiovascular: Normal rate, regular rhythm and normal heart sounds.    Pulmonary/Chest: Effort normal and breath sounds normal. No respiratory distress. No  wheezes.   Abdominal: Soft. Bowel sounds are normal.   Skin: Skin is warm and dry.   Psychiatric: Mood, memory, affect and judgment normal.     Assessment/Plan     Diagnosis:  75 y.o. female.  He has a history of colon polyps.  She also has a family history of colorectal cancer in grandfather and nephew had colon cancer.  Her last colonoscopy was in 2 of 2019.    Anticipated Surgical Procedure:  Colonoscopy    The risks, benefits, and alternatives of this procedure have been discussed with the patient or the responsible party- the patient understands and agrees to proceed.    Suhail Rothman M.D.  "

## 2021-02-14 NOTE — PROGRESS NOTES
02/14/21  Tell her that the colon polyps that were removed were not cancerous and not precancerous, which is good.  I would recommend that in 5 years she should at least think about having another colonoscopy.  In 5 years she will be 80 years of age and she may decide that she does not want a colonoscopy at that age, which is her choice.  Please fax a copy of this report to her PCP.  Sonal. [email or web address]

## 2021-02-16 ENCOUNTER — TELEPHONE (OUTPATIENT)
Dept: GASTROENTEROLOGY | Facility: CLINIC | Age: 76
End: 2021-02-16

## 2021-02-16 NOTE — TELEPHONE ENCOUNTER
Called pt and advised of Dr Rothman's note. Verb understanding.     C/splaced in recall and hm for 02/09/2026.    Results sent to [facility] NP thru [facility].

## 2021-02-16 NOTE — TELEPHONE ENCOUNTER
----- Message from Suhail Rothman MD sent at 2/14/2021  4:57 PM EST -----  02/14/21  Tell her that the colon polyps that were removed were not cancerous and not precancerous, which is good.  I would recommend that in 5 years she should at least think about having another colonoscopy.  In 5 years she will be 80 years of age and she may decide that she does not want a colonoscopy at that age, which is her choice.  Please fax a copy of this report to her PCP.  Shirley reaves

## 2021-02-17 ENCOUNTER — IMMUNIZATION (OUTPATIENT)
Dept: VACCINE CLINIC | Facility: HOSPITAL | Age: 76
End: 2021-02-17

## 2021-02-17 PROCEDURE — 0002A: CPT | Performed by: INTERNAL MEDICINE

## 2021-02-17 PROCEDURE — 91300 HC SARSCOV02 VAC 30MCG/0.3ML IM: CPT | Performed by: INTERNAL MEDICINE

## 2021-02-26 ENCOUNTER — OFFICE VISIT (OUTPATIENT)
Dept: INTERNAL MEDICINE | Facility: CLINIC | Age: 76
End: 2021-02-26

## 2021-02-26 VITALS
WEIGHT: 141 LBS | HEIGHT: 66 IN | TEMPERATURE: 97.8 F | HEART RATE: 59 BPM | DIASTOLIC BLOOD PRESSURE: 80 MMHG | BODY MASS INDEX: 22.66 KG/M2 | SYSTOLIC BLOOD PRESSURE: 152 MMHG | OXYGEN SATURATION: 99 %

## 2021-02-26 DIAGNOSIS — K63.5 POLYP OF COLON, UNSPECIFIED PART OF COLON, UNSPECIFIED TYPE: ICD-10-CM

## 2021-02-26 DIAGNOSIS — I10 ESSENTIAL HYPERTENSION: Primary | Chronic | ICD-10-CM

## 2021-02-26 DIAGNOSIS — E78.2 MIXED HYPERLIPIDEMIA: ICD-10-CM

## 2021-02-26 DIAGNOSIS — Z00.00 HEALTHCARE MAINTENANCE: ICD-10-CM

## 2021-02-26 PROBLEM — R05.9 COUGH: Status: RESOLVED | Noted: 2019-01-03 | Resolved: 2021-02-26

## 2021-02-26 PROBLEM — J06.9 UPPER RESPIRATORY INFECTION: Status: RESOLVED | Noted: 2019-01-03 | Resolved: 2021-02-26

## 2021-02-26 PROCEDURE — 99214 OFFICE O/P EST MOD 30 MIN: CPT | Performed by: NURSE PRACTITIONER

## 2021-02-26 NOTE — PROGRESS NOTES
"Chief Complaint  Hypertension    Subjective          Kamilla Hawthorne presents to [facility]  Patient presents for 6-month follow-up.  This is a 75-year-old female.    She has hypertension treated with losartan 50 mg daily.  Blood pressure in the office today is a bit high at 152/80 but she brings a log of her home blood pressures which are averaging in the 130s over 70s consistently.  No headaches, visual changes, shortness of breath or chest discomfort.    She has hyperlipidemia treated with atorvastatin 10 mg daily reporting good compliance with this medication.    She has a history of colon polyps, most recent colonoscopy February 2021 with  showing polyps, none of which were cancerous or precancerous.  It was recommended that she repeat the colonoscopy in 5 years if she wishes to proceed with it at that time as she will be age 80.    She states that she is feeling very well overall.  She has had both doses of COVID-19 vaccination.    Denies development of any other new issues today.       Review of Systems   All other systems reviewed and are negative.    Objective   Vital Signs:   /80   Pulse 59   Temp 97.8 °F (36.6 °C)   Ht 167.6 cm (66\")   Wt 64 kg (141 lb)   SpO2 99%   BMI 22.76 kg/m²     Physical Exam  Vitals signs and nursing note reviewed.   Constitutional:       General: She is not in acute distress.     Appearance: Normal appearance. She is well-developed. She is not ill-appearing, toxic-appearing or diaphoretic.   HENT:      Head: Normocephalic and atraumatic.      Right Ear: Tympanic membrane, ear canal and external ear normal.      Left Ear: Tympanic membrane, ear canal and external ear normal.   Eyes:      Pupils: Pupils are equal, round, and reactive to light.   Neck:      Musculoskeletal: Normal range of motion and neck supple. No neck rigidity or muscular tenderness.      Vascular: No carotid bruit.   Cardiovascular:      Rate and Rhythm: Normal " rate and regular rhythm.      Pulses: Normal pulses.      Heart sounds: Normal heart sounds.   Pulmonary:      Effort: Pulmonary effort is normal. No respiratory distress.      Breath sounds: Normal breath sounds. No stridor. No wheezing, rhonchi or rales.   Chest:      Chest wall: No tenderness.   Abdominal:      General: Bowel sounds are normal. There is no distension.      Palpations: Abdomen is soft. There is no mass.      Tenderness: There is no abdominal tenderness. There is no right CVA tenderness, left CVA tenderness, guarding or rebound.      Hernia: No hernia is present.   Musculoskeletal: Normal range of motion.   Lymphadenopathy:      Cervical: No cervical adenopathy.   Skin:     General: Skin is warm and dry.      Capillary Refill: Capillary refill takes less than 2 seconds.   Neurological:      General: No focal deficit present.      Mental Status: She is alert and oriented to person, place, and time. Mental status is at baseline.   Psychiatric:         Mood and Affect: Mood normal.         Behavior: Behavior normal.         Thought Content: Thought content normal.         Judgment: Judgment normal.        Result Review :   The following data was reviewed by: BELLA Quiñones on 02/26/2021:  Common labs    Common Labsle 8/26/20 8/26/20 8/26/20 11/30/20 11/30/20    1358 1358 1358 0430 0430   Glucose  83   83   BUN  15   19   Creatinine  0.68   0.69   eGFR Non  Am  85   83   eGFR African Am  103   101   Sodium  141   136   Potassium  4.0   3.9   Chloride  101   97 (A)   Calcium  9.2   9.1   Total Protein  6.8      Albumin  4.70      Total Bilirubin  1.2      Alkaline Phosphatase  74      AST (SGOT)  22      ALT (SGPT)  22      WBC 5.83   6.64    Hemoglobin 14.8   13.6    Hematocrit 45.3   40.2    Platelets 264   256    Total Cholesterol   169     Triglycerides   55     HDL Cholesterol   83 (A)     LDL Cholesterol    75     (A) Abnormal value       Comments are available for some flowsheets but  are not being displayed.           Data reviewed: Colonoscopy, February 2021, Dr. Rothman notes and recommendations/pathology          Assessment and Plan    Diagnoses and all orders for this visit:    1. Essential hypertension (Primary)  Assessment & Plan:  Well-controlled with losartan 50 mg daily, home readings are reviewed.  Continue routine home monitoring for goal of less than 140/80.    Orders:  -     CBC No Differential  -     Comprehensive metabolic panel  -     TSH Rfx On Abnormal To Free T4    2. Mixed hyperlipidemia  Assessment & Plan:  Continue atorvastatin 10 mg daily.  Lipid panel today and we will adjust therapy if needed.    Orders:  -     Lipid panel    3. Healthcare maintenance  Assessment & Plan:  Up-to-date on vaccinations.    Mammogram scheduled for next month.      4. Polyp of colon, unspecified part of colon, unspecified type  Assessment & Plan:  Reviewed colonoscopy from February 2021.  She is going to consider a repeat in 5 years per Dr Rothman's recommendation.    We will contact patient with lab results and any further recommendations.  Follow-up as needed and I will see her back in 6 months for a Medicare wellness visit.    Follow Up   Return in about 6 months (around 8/26/2021) for Medicare Wellness.  Patient was given instructions and counseling regarding her condition or for health maintenance advice. Please see specific information pulled into the AVS if appropriate.

## 2021-02-26 NOTE — ASSESSMENT & PLAN NOTE
Reviewed colonoscopy from February 2021.  She is going to consider a repeat in 5 years per Dr Rothman's recommendation.

## 2021-02-26 NOTE — ASSESSMENT & PLAN NOTE
Well-controlled with losartan 50 mg daily, home readings are reviewed.  Continue routine home monitoring for goal of less than 140/80.

## 2021-02-27 LAB
ALBUMIN SERPL-MCNC: 4.4 G/DL (ref 3.7–4.7)
ALBUMIN/GLOB SERPL: 1.5 {RATIO} (ref 1.2–2.2)
ALP SERPL-CCNC: 96 IU/L (ref 39–117)
ALT SERPL-CCNC: 25 IU/L (ref 0–32)
AST SERPL-CCNC: 24 IU/L (ref 0–40)
BILIRUB SERPL-MCNC: 1.3 MG/DL (ref 0–1.2)
BUN SERPL-MCNC: 13 MG/DL (ref 8–27)
BUN/CREAT SERPL: 22 (ref 12–28)
CALCIUM SERPL-MCNC: 9.5 MG/DL (ref 8.7–10.3)
CHLORIDE SERPL-SCNC: 106 MMOL/L (ref 96–106)
CHOLEST SERPL-MCNC: 161 MG/DL (ref 100–199)
CO2 SERPL-SCNC: 25 MMOL/L (ref 20–29)
CREAT SERPL-MCNC: 0.59 MG/DL (ref 0.57–1)
ERYTHROCYTE [DISTWIDTH] IN BLOOD BY AUTOMATED COUNT: 12.7 % (ref 11.7–15.4)
GLOBULIN SER CALC-MCNC: 2.9 G/DL (ref 1.5–4.5)
GLUCOSE SERPL-MCNC: 78 MG/DL (ref 65–99)
HCT VFR BLD AUTO: 42.9 % (ref 34–46.6)
HDLC SERPL-MCNC: 77 MG/DL
HGB BLD-MCNC: 13.8 G/DL (ref 11.1–15.9)
LDLC SERPL CALC-MCNC: 73 MG/DL (ref 0–99)
MCH RBC QN AUTO: 29.4 PG (ref 26.6–33)
MCHC RBC AUTO-ENTMCNC: 32.2 G/DL (ref 31.5–35.7)
MCV RBC AUTO: 92 FL (ref 79–97)
PLATELET # BLD AUTO: 256 X10E3/UL (ref 150–450)
POTASSIUM SERPL-SCNC: 4.4 MMOL/L (ref 3.5–5.2)
PROT SERPL-MCNC: 7.3 G/DL (ref 6–8.5)
RBC # BLD AUTO: 4.69 X10E6/UL (ref 3.77–5.28)
SODIUM SERPL-SCNC: 144 MMOL/L (ref 134–144)
TRIGL SERPL-MCNC: 55 MG/DL (ref 0–149)
TSH SERPL DL<=0.005 MIU/L-ACNC: 1.65 UIU/ML (ref 0.45–4.5)
VLDLC SERPL CALC-MCNC: 11 MG/DL (ref 5–40)
WBC # BLD AUTO: 5.4 X10E3/UL (ref 3.4–10.8)

## 2021-03-01 ENCOUNTER — TELEPHONE (OUTPATIENT)
Dept: INTERNAL MEDICINE | Facility: CLINIC | Age: 76
End: 2021-03-01

## 2021-03-01 DIAGNOSIS — E78.2 MIXED HYPERLIPIDEMIA: ICD-10-CM

## 2021-03-01 RX ORDER — ATORVASTATIN CALCIUM 10 MG/1
TABLET, FILM COATED ORAL
Qty: 90 TABLET | Refills: 1 | Status: SHIPPED | OUTPATIENT
Start: 2021-03-01 | End: 2021-08-30

## 2021-03-01 NOTE — TELEPHONE ENCOUNTER
Pt returned our call and she was given her lab results per BELLA Guzmán. She asked that her labs be mailed out to her. I told her that they would be.

## 2021-03-01 NOTE — TELEPHONE ENCOUNTER
----- Message from BELLA Guzmán sent at 3/1/2021 10:35 AM EST -----  Please call patient-blood count is stable, no anemia.  Metabolic panel looks stable including kidney function, liver enzymes and electrolytes.  Cholesterol panel looks good.  Thyroid numbers are normal.

## 2021-03-18 ENCOUNTER — APPOINTMENT (OUTPATIENT)
Dept: MAMMOGRAPHY | Facility: HOSPITAL | Age: 76
End: 2021-03-18

## 2021-03-31 ENCOUNTER — APPOINTMENT (OUTPATIENT)
Dept: MAMMOGRAPHY | Facility: HOSPITAL | Age: 76
End: 2021-03-31

## 2021-04-09 ENCOUNTER — HOSPITAL ENCOUNTER (OUTPATIENT)
Dept: MAMMOGRAPHY | Facility: HOSPITAL | Age: 76
Discharge: HOME | End: 2021-04-09
Admitting: NURSE PRACTITIONER

## 2021-04-09 DIAGNOSIS — Z12.31 VISIT FOR SCREENING MAMMOGRAM: ICD-10-CM

## 2021-04-09 PROCEDURE — 77063 BREAST TOMOSYNTHESIS BI: CPT

## 2021-04-09 PROCEDURE — 77067 SCR MAMMO BI INCL CAD: CPT

## 2021-04-12 NOTE — PROGRESS NOTES
Please call pt- mammogram is stable, no evidence of malignancy or change in either breast. Routine follow up screening mammogram recommended in one year.

## 2021-06-27 DIAGNOSIS — I10 ESSENTIAL HYPERTENSION: ICD-10-CM

## 2021-06-28 RX ORDER — LOSARTAN POTASSIUM 50 MG/1
TABLET ORAL
Qty: 90 TABLET | Refills: 1 | Status: SHIPPED | OUTPATIENT
Start: 2021-06-28 | End: 2021-10-21 | Stop reason: DRUGHIGH

## 2021-08-29 DIAGNOSIS — E78.2 MIXED HYPERLIPIDEMIA: ICD-10-CM

## 2021-08-30 RX ORDER — ATORVASTATIN CALCIUM 10 MG/1
TABLET, FILM COATED ORAL
Qty: 90 TABLET | Refills: 1 | Status: SHIPPED | OUTPATIENT
Start: 2021-08-30 | End: 2022-05-05

## 2021-09-09 ENCOUNTER — OFFICE VISIT (OUTPATIENT)
Dept: INTERNAL MEDICINE | Facility: CLINIC | Age: 76
End: 2021-09-09

## 2021-09-09 VITALS
BODY MASS INDEX: 24.91 KG/M2 | HEIGHT: 66 IN | HEART RATE: 62 BPM | OXYGEN SATURATION: 98 % | WEIGHT: 155 LBS | SYSTOLIC BLOOD PRESSURE: 130 MMHG | DIASTOLIC BLOOD PRESSURE: 72 MMHG | TEMPERATURE: 96.9 F | RESPIRATION RATE: 16 BRPM

## 2021-09-09 DIAGNOSIS — Z00.00 ENCOUNTER FOR PREVENTIVE HEALTH EXAMINATION: Primary | ICD-10-CM

## 2021-09-09 DIAGNOSIS — E78.2 MIXED HYPERLIPIDEMIA: Chronic | ICD-10-CM

## 2021-09-09 DIAGNOSIS — R55 SYNCOPE, UNSPECIFIED SYNCOPE TYPE: ICD-10-CM

## 2021-09-09 DIAGNOSIS — I10 ESSENTIAL HYPERTENSION: Chronic | ICD-10-CM

## 2021-09-09 PROBLEM — Z86.0101 HISTORY OF ADENOMATOUS POLYP OF COLON: Status: ACTIVE | Noted: 2020-12-02

## 2021-09-09 PROBLEM — Z86.010 HISTORY OF ADENOMATOUS POLYP OF COLON: Status: ACTIVE | Noted: 2020-12-02

## 2021-09-09 PROBLEM — H92.01 RIGHT EAR PAIN: Status: RESOLVED | Noted: 2019-01-03 | Resolved: 2021-09-09

## 2021-09-09 PROBLEM — H10.022 PINK EYE, LEFT: Status: RESOLVED | Noted: 2019-01-03 | Resolved: 2021-09-09

## 2021-09-09 PROBLEM — H66.90 SUBACUTE OTITIS MEDIA: Status: RESOLVED | Noted: 2019-01-03 | Resolved: 2021-09-09

## 2021-09-09 PROCEDURE — 99213 OFFICE O/P EST LOW 20 MIN: CPT | Performed by: INTERNAL MEDICINE

## 2021-09-09 PROCEDURE — 99397 PER PM REEVAL EST PAT 65+ YR: CPT | Performed by: INTERNAL MEDICINE

## 2021-09-09 PROCEDURE — 1160F RVW MEDS BY RX/DR IN RCRD: CPT | Performed by: INTERNAL MEDICINE

## 2021-09-09 PROCEDURE — G0439 PPPS, SUBSEQ VISIT: HCPCS | Performed by: INTERNAL MEDICINE

## 2021-09-09 PROCEDURE — 93000 ELECTROCARDIOGRAM COMPLETE: CPT | Performed by: INTERNAL MEDICINE

## 2021-09-09 PROCEDURE — 1170F FXNL STATUS ASSESSED: CPT | Performed by: INTERNAL MEDICINE

## 2021-09-09 NOTE — PROGRESS NOTES
Subjective   Kamilla Hawthorne is a 75 y.o. female.     Chief Complaint   Patient presents with   • New Patient     AWV   • Medicare Wellness-subsequent         In today for initial visit, transfer of care, and annual preventive exam.  Sleep is good.  Gets 8 hours per night.  Exercises 7 days/week.  Energy is good.  Diet is well-balanced.  She has had 3 syncopal episodes or near syncopal episodes in the past 2 years.  The most recent was August 2021.  The first spell was after having some wine when she was upset in 2020.  The last 2 spells seem to occur after she had skipped lunch and was running around.  One occurred at [employer].  She got hot and sweaty and had to sit down.  Buckled to her knees.  Hard to say if there was full loss of consciousness.  That was the only time she actually lost posture.       The following portions of the patient's history were reviewed and updated as appropriate: allergies, current medications, past social history and problem list.    Outpatient Medications Marked as Taking for the 9/9/21 encounter (Office Visit) with Ishmael Medellin MD   Medication Sig Dispense Refill   • atorvastatin (LIPITOR) 10 MG tablet TAKE 1 TABLET BY MOUTH EVERY DAY 90 tablet 1   • cholecalciferol (VITAMIN D3) 1000 units tablet Take 1,000 Units by mouth Daily.     • dorzolamide-timolol (COSOPT) 22.3-6.8 MG/ML ophthalmic solution INSTILL 1 DROP BY OPHTHALMIC ROUTE 2 TIMES EVERY DAY INTO BOTH EYES  6   • latanoprost (XALATAN) 0.005 % ophthalmic solution INSTILL 1 DROP BY OPHTHALMIC ROUTE EVERY DAY BOTH EYES AT BEDTIME  6   • losartan (COZAAR) 50 MG tablet TAKE 1 TABLET BY MOUTH EVERY DAY 90 tablet 1   • melatonin 3 MG tablet Take 3 mg by mouth Every Night.     • vitamin C (ASCORBIC ACID) 500 MG tablet Take 500 mg by mouth Daily.         Review of Systems   Constitutional: Negative for appetite change, chills, diaphoresis, fatigue, fever and unexpected weight change.   HENT: Positive for hearing loss.     Respiratory: Negative for cough, chest tightness, shortness of breath and wheezing.    Cardiovascular: Negative for chest pain, palpitations and leg swelling.   Gastrointestinal: Positive for constipation. Negative for abdominal pain, anal bleeding, blood in stool, diarrhea, nausea, rectal pain and vomiting.   Genitourinary: Negative for difficulty urinating, dysuria, flank pain, frequency, hematuria and urgency.   Musculoskeletal: Negative for arthralgias, back pain and myalgias.   Allergic/Immunologic: Positive for environmental allergies.   Neurological: Negative for dizziness, syncope, speech difficulty, weakness, light-headedness, numbness and headaches.   Hematological: Does not bruise/bleed easily.   Psychiatric/Behavioral: Negative for agitation, confusion, dysphoric mood and sleep disturbance. The patient is not nervous/anxious.        Objective   Vitals:    09/09/21 1035   BP: 130/72   Pulse: 62   Resp: 16   Temp: 96.9 °F (36.1 °C)   SpO2: 98%      Wt Readings from Last 3 Encounters:   09/09/21 70.3 kg (155 lb)   02/26/21 64 kg (141 lb)   02/09/21 63.5 kg (140 lb)    Body mass index is 25.02 kg/m².      Physical Exam  Vitals and nursing note reviewed.   Constitutional:       Appearance: Normal appearance. She is well-developed.   HENT:      Right Ear: External ear normal.      Left Ear: External ear normal.      Nose: Nose normal.   Eyes:      Conjunctiva/sclera: Conjunctivae normal.      Pupils: Pupils are equal, round, and reactive to light.   Neck:      Thyroid: No thyromegaly.      Vascular: No JVD.   Cardiovascular:      Rate and Rhythm: Normal rate and regular rhythm.      Heart sounds: Normal heart sounds. No murmur heard.   No gallop.    Pulmonary:      Effort: Pulmonary effort is normal. No respiratory distress.      Breath sounds: Normal breath sounds. No wheezing or rales.   Abdominal:      General: Bowel sounds are normal. There is no distension.      Palpations: Abdomen is soft. There is no  mass.      Tenderness: There is no abdominal tenderness. There is no guarding.      Hernia: No hernia is present.   Musculoskeletal:         General: Normal range of motion.      Cervical back: Normal range of motion and neck supple.   Lymphadenopathy:      Cervical: No cervical adenopathy.   Skin:     General: Skin is warm and dry.   Neurological:      General: No focal deficit present.      Mental Status: She is alert and oriented to person, place, and time.      Cranial Nerves: No cranial nerve deficit.      Coordination: Coordination normal.      Deep Tendon Reflexes: Reflexes normal.   Psychiatric:         Mood and Affect: Mood normal.         Behavior: Behavior normal.         Thought Content: Thought content normal.         Judgment: Judgment normal.         ECG 12 Lead    Date/Time: 9/9/2021 12:43 PM  Performed by: Ishmael Medellin MD  Authorized by: Ishmael Medellin MD   Comparison: compared with previous ECG   Similar to previous ECG  Rhythm: sinus bradycardia  Rate: bradycardic  Conduction: conduction normal  ST Segments: ST segments normal  T Waves: T waves normal  QRS axis: normal  Other: no other findings    Clinical impression: normal ECG  Comments: Sinus bradycardia.  Heart rate is 57.  There is borderline left atrial enlargement.  Otherwise normal EKG.  There is no prior EKG available for comparison.                Problems Addressed this Visit        Cardiac and Vasculature    Essential hypertension (Chronic)    Mixed hyperlipidemia (Chronic)      Other Visit Diagnoses     Encounter for preventive health examination    -  Primary      Diagnoses       Codes Comments    Encounter for preventive health examination    -  Primary ICD-10-CM: Z00.00  ICD-9-CM: V70.0     Essential hypertension     ICD-10-CM: I10  ICD-9-CM: 401.9     Mixed hyperlipidemia     ICD-10-CM: E78.2  ICD-9-CM: 272.2         Assessment/Plan   In today for initial visit, transfer of care, and annual preventive exam.  She has a history  of hypertension and hyperlipidemia.  Family history of colon cancer and personal history of adenomatous colon polyps.  Distant history of malignant melanoma.  She relates 3 near syncopal episodes.  The last one occurred 1 month ago and she might of actually fallen to her knees from a chair.  Had to sit down because she felt hot and sweaty and lightheaded at [place].  She seems to think all of these spells occurred because she did miss lunch and was hungry.  That is certainly hard to say.  They sound like pretty typical vasovagal episodes.  Later that day she checked her blood pressure and it was running 80/50 range through the next few hours and then gradually returned to normal by 10 PM.  She held her blood pressure medicine the next day.  Clearly it sounds like she had a spell of hypotension the etiology of which is not clear.  She has had no prior evaluation of these.  We will plan to check a TTE and a 24-hour Holter.  It does not appear that dehydration was an issue.  I suppose she might of accidentally overtaken her medicine.  We will check for orthostasis today.  She had comprehensive lab work in February 2021 including a CBC, CMP, lipid profile, TSH which were all normal.  We will check an EKG today.  Also a CBC, CMP and lipid profile given her recent syncopal episode.  We will plan to monitor at 6-month intervals.  Spent an additional 20 minutes with patient evaluating her syncopal episodes.    Prevention counseling was performed today. The counseling performed was routine health maintenance topics.    The above information was reviewed again today 09/09/21.  It continues to be accurate as reflected above and is unchanged.  History, physical and review of systems all reviewed and are unchanged.  Medications were reviewed today and continue the current dosing.    PPE today includes face mask and eye shield.               Dragon disclaimer:   Much of this encounter note is an electronic  transcription/translation of spoken language to printed text. The electronic translation of spoken language may permit erroneous, or at times, nonsensical words or phrases to be inadvertently transcribed; Although I have reviewed the note for such errors, some may still exist.

## 2021-09-09 NOTE — PROGRESS NOTES
The ABCs of the Annual Wellness Visit  Subsequent Medicare Wellness Visit    Chief Complaint   Patient presents with   • New Patient     AWV   • Medicare Wellness-subsequent      Subjective    History of Present Illness:  Kamilla Hawthorne is a 75 y.o. female who presents for a Subsequent Medicare Wellness Visit.    The following portions of the patient's history were reviewed and   updated as appropriate: allergies, current medications, past family history, past medical history, past social history, past surgical history and problem list.     Compared to one year ago, the patient feels her physical   health is the same.    Compared to one year ago, the patient feels her mental   health is the same.    Recent Hospitalizations:  This patient has had a [facility] admission record on file within the last 365 days.    Current Medical Providers:  Patient Care Team:  Ishmael Medellin MD as PCP - General (Internal Medicine)  Ishmael Lozano Jr., MD (Dermatology)  Suhail Rothman MD as Consulting Physician (Gastroenterology)    Outpatient Medications Prior to Visit   Medication Sig Dispense Refill   • atorvastatin (LIPITOR) 10 MG tablet TAKE 1 TABLET BY MOUTH EVERY DAY 90 tablet 1   • cholecalciferol (VITAMIN D3) 1000 units tablet Take 1,000 Units by mouth Daily.     • dorzolamide-timolol (COSOPT) 22.3-6.8 MG/ML ophthalmic solution INSTILL 1 DROP BY OPHTHALMIC ROUTE 2 TIMES EVERY DAY INTO BOTH EYES  6   • latanoprost (XALATAN) 0.005 % ophthalmic solution INSTILL 1 DROP BY OPHTHALMIC ROUTE EVERY DAY BOTH EYES AT BEDTIME  6   • losartan (COZAAR) 50 MG tablet TAKE 1 TABLET BY MOUTH EVERY DAY 90 tablet 1   • melatonin 3 MG tablet Take 3 mg by mouth Every Night.     • vitamin C (ASCORBIC ACID) 500 MG tablet Take 500 mg by mouth Daily.       No facility-administered medications prior to visit.       No opioid medication identified on active medication list. I have reviewed chart for other potential  high risk medication/s and  "harmful drug interactions in the elderly.          Aspirin is not on active medication list.  Aspirin use is not indicated based on review of current medical condition/s. Risk of harm outweighs potential benefits.  .    Patient Active Problem List   Diagnosis   • Medicare annual wellness visit, subsequent   • Essential hypertension   • Multiple polyps of sigmoid colon   • Healthcare maintenance   • Colon polyp   • FH: colon cancer   • Right ear pain   • Pink eye, left   • Subacute otitis media   • History of melanoma   • Colon polyps   • Family history of colon cancer   • Mixed hyperlipidemia     Advance Care Planning   Advance Directive is not on file.  ACP discussion was held with the patient during this visit. Patient has an advance directive (not in EMR), copy requested.          Objective       Vitals:    09/09/21 1035   Height: 167.6 cm (66\")     BMI Readings from Last 1 Encounters:   09/09/21 22.76 kg/m²   BMI is within normal parameters. No follow-up required.  Body mass index is 22.76 kg/m².  BMI has not been calculated during today's encounter.     Does the patient have evidence of cognitive impairment? Yes    Physical Exam            HEALTH RISK ASSESSMENT    Smoking Status:  Social History     Tobacco Use   Smoking Status Former Smoker   • Types: Cigarettes   Smokeless Tobacco Never Used     Alcohol Consumption:  Social History     Substance and Sexual Activity   Alcohol Use Yes    Comment: SOCIAL     Fall Risk Screen:    STEADI Fall Risk Assessment has not been completed.    Depression Screening:  PHQ-2/PHQ-9 Depression Screening 9/9/2021   Little interest or pleasure in doing things 0   Feeling down, depressed, or hopeless 0   Trouble falling or staying asleep, or sleeping too much 0   Feeling tired or having little energy 0   Poor appetite or overeating 0   Feeling bad about yourself - or that you are a failure or have let yourself or your family down 0   Trouble concentrating on things, such as " reading the newspaper or watching television 0   Moving or speaking so slowly that other people could have noticed. Or the opposite - being so fidgety or restless that you have been moving around a lot more than usual 0   Thoughts that you would be better off dead, or of hurting yourself in some way 0   Total Score 0       Health Habits and Functional and Cognitive Screening:  Functional & Cognitive Status 9/9/2021   Do you have difficulty preparing food and eating? No   Do you have difficulty bathing yourself, getting dressed or grooming yourself? No   Do you have difficulty using the toilet? No   Do you have difficulty moving around from place to place? No   Do you have trouble with steps or getting out of a bed or a chair? No   Current Diet Well Balanced Diet   Dental Exam Up to date   Eye Exam Up to date   Exercise (times per week) 7 times per week   Current Exercises Include Walking   Current Exercise Activities Include -   Do you need help using the phone?  No   Are you deaf or do you have serious difficulty hearing?  No   Do you need help with transportation? No   Do you need help shopping? No   Do you need help preparing meals?  No   Do you need help with housework?  No   Do you need help with laundry? No   Do you need help taking your medications? No   Do you need help managing money? No   Do you ever drive or ride in a car without wearing a seat belt? No   Have you felt unusual stress, anger or loneliness in the last month? No   Who do you live with? Spouse   If you need help, do you have trouble finding someone available to you? No   Have you been bothered in the last four weeks by sexual problems? No   Do you have difficulty concentrating, remembering or making decisions? -       Age-appropriate Screening Schedule:  Refer to the list below for future screening recommendations based on patient's age, sex and/or medical conditions. Orders for these recommended tests are listed in the plan section. The  patient has been provided with a written plan.    Health Maintenance   Topic Date Due   • TDAP/TD VACCINES (1 - Tdap) Never done   • DXA SCAN  08/11/2018   • INFLUENZA VACCINE  10/01/2021   • LIPID PANEL  02/26/2022   • MAMMOGRAM  04/09/2023   • ZOSTER VACCINE  Completed              Assessment/Plan     CMS Preventative Services Quick Reference  Risk Factors Identified During Encounter  Fall Risk-High or Moderate  Glaucoma or Family History of Glaucoma  Hearing Problem  The above risks/problems have been discussed with the patient.  Follow up actions/plans if indicated are seen below in the Assessment/Plan Section.  Pertinent information has been shared with the patient in the After Visit Summary.    There are no diagnoses linked to this encounter.    Follow Up:   No follow-ups on file.     An After Visit Summary and PPPS were given to the patient.    I spent 15 minutes caring for Kamilla on this date of service. This time includes time spent by me in the following activities:preparing for the visit

## 2021-09-10 LAB
ALBUMIN SERPL-MCNC: 4.7 G/DL (ref 3.5–5.2)
ALBUMIN/GLOB SERPL: 2.2 G/DL
ALP SERPL-CCNC: 89 U/L (ref 39–117)
ALT SERPL-CCNC: 20 U/L (ref 1–33)
AST SERPL-CCNC: 24 U/L (ref 1–32)
BASOPHILS # BLD AUTO: 0.05 10*3/MM3 (ref 0–0.2)
BASOPHILS NFR BLD AUTO: 0.7 % (ref 0–1.5)
BILIRUB SERPL-MCNC: 1.3 MG/DL (ref 0–1.2)
BUN SERPL-MCNC: 16 MG/DL (ref 8–23)
BUN/CREAT SERPL: 26.2 (ref 7–25)
CALCIUM SERPL-MCNC: 9.5 MG/DL (ref 8.6–10.5)
CHLORIDE SERPL-SCNC: 104 MMOL/L (ref 98–107)
CHOLEST SERPL-MCNC: 160 MG/DL (ref 0–200)
CHOLEST/HDLC SERPL: 1.98 {RATIO}
CO2 SERPL-SCNC: 27.5 MMOL/L (ref 22–29)
CREAT SERPL-MCNC: 0.61 MG/DL (ref 0.57–1)
EOSINOPHIL # BLD AUTO: 0.1 10*3/MM3 (ref 0–0.4)
EOSINOPHIL NFR BLD AUTO: 1.3 % (ref 0.3–6.2)
ERYTHROCYTE [DISTWIDTH] IN BLOOD BY AUTOMATED COUNT: 12.8 % (ref 12.3–15.4)
GLOBULIN SER CALC-MCNC: 2.1 GM/DL
GLUCOSE SERPL-MCNC: 84 MG/DL (ref 65–99)
HCT VFR BLD AUTO: 44.9 % (ref 34–46.6)
HDLC SERPL-MCNC: 81 MG/DL (ref 40–60)
HGB BLD-MCNC: 14.6 G/DL (ref 12–15.9)
IMM GRANULOCYTES # BLD AUTO: 0.04 10*3/MM3 (ref 0–0.05)
IMM GRANULOCYTES NFR BLD AUTO: 0.5 % (ref 0–0.5)
LDLC SERPL CALC-MCNC: 68 MG/DL (ref 0–100)
LYMPHOCYTES # BLD AUTO: 1.79 10*3/MM3 (ref 0.7–3.1)
LYMPHOCYTES NFR BLD AUTO: 23.6 % (ref 19.6–45.3)
MCH RBC QN AUTO: 30.5 PG (ref 26.6–33)
MCHC RBC AUTO-ENTMCNC: 32.5 G/DL (ref 31.5–35.7)
MCV RBC AUTO: 93.7 FL (ref 79–97)
MONOCYTES # BLD AUTO: 0.5 10*3/MM3 (ref 0.1–0.9)
MONOCYTES NFR BLD AUTO: 6.6 % (ref 5–12)
NEUTROPHILS # BLD AUTO: 5.09 10*3/MM3 (ref 1.7–7)
NEUTROPHILS NFR BLD AUTO: 67.3 % (ref 42.7–76)
NRBC BLD AUTO-RTO: 0.1 /100 WBC (ref 0–0.2)
PLATELET # BLD AUTO: 230 10*3/MM3 (ref 140–450)
POTASSIUM SERPL-SCNC: 4.7 MMOL/L (ref 3.5–5.2)
PROT SERPL-MCNC: 6.8 G/DL (ref 6–8.5)
RBC # BLD AUTO: 4.79 10*6/MM3 (ref 3.77–5.28)
SODIUM SERPL-SCNC: 144 MMOL/L (ref 136–145)
TRIGL SERPL-MCNC: 54 MG/DL (ref 0–150)
VLDLC SERPL CALC-MCNC: 11 MG/DL (ref 5–40)
WBC # BLD AUTO: 7.57 10*3/MM3 (ref 3.4–10.8)

## 2021-10-01 ENCOUNTER — IMMUNIZATION (OUTPATIENT)
Dept: VACCINE CLINIC | Facility: HOSPITAL | Age: 76
End: 2021-10-01

## 2021-10-01 ENCOUNTER — APPOINTMENT (OUTPATIENT)
Dept: VACCINE CLINIC | Facility: HOSPITAL | Age: 76
End: 2021-10-01

## 2021-10-01 PROCEDURE — 0003A: CPT | Performed by: INTERNAL MEDICINE

## 2021-10-01 PROCEDURE — 91300 HC SARSCOV02 VAC 30MCG/0.3ML IM: CPT | Performed by: INTERNAL MEDICINE

## 2021-10-01 PROCEDURE — 0004A ADM SARSCOV2 30MCG/0.3ML BOOSTER: CPT | Performed by: INTERNAL MEDICINE

## 2021-10-20 ENCOUNTER — PATIENT ROUNDING (BHMG ONLY) (OUTPATIENT)
Dept: INTERNAL MEDICINE | Facility: CLINIC | Age: 76
End: 2021-10-20

## 2021-10-20 NOTE — PROGRESS NOTES
October 20, 2021    Hello, may I speak with Kamilla Hawthorne?    My name is Erika      I am  with [facility]  [facility]  [address] [address] [phone].    Before we get started may I verify your date of birth? 1945    I am calling to officially welcome you to our practice and ask about your recent visit. Is this a good time to talk? yes    Tell me about your visit with us. What things went well?  Dr. Medellin was highly recommended to patient from family. Appreciated Thank you cards that we sent after visit.     We're always looking for ways to make our patients' experiences even better. Do you have recommendations on ways we may improve?  no    Overall were you satisfied with your first visit to our practice? yes       I appreciate you taking the time to speak with me today. Is there anything else I can do for you? No- blood pressure stable      Thank you, and have a great day.

## 2021-10-21 ENCOUNTER — TELEPHONE (OUTPATIENT)
Dept: INTERNAL MEDICINE | Facility: CLINIC | Age: 76
End: 2021-10-21

## 2021-10-21 RX ORDER — LOSARTAN POTASSIUM 25 MG/1
25 TABLET ORAL DAILY
COMMUNITY

## 2021-10-21 NOTE — TELEPHONE ENCOUNTER
That is very unusual for low Paul all by itself.  Nonetheless skip 1 dose and then cut dose in half.  Push fluids and hydration.

## 2021-10-21 NOTE — TELEPHONE ENCOUNTER
Pt reports low bp episode today 87/56 initially with dizziness. Dizziness has cleared & retake bp is 102/68. Patient is taking losartan 50mg daily. Please advise.

## 2021-10-21 NOTE — TELEPHONE ENCOUNTER
Pt informed & verbalized understanding. She will call us back if symptoms persist or has issues balancing bp readings.

## 2021-10-22 ENCOUNTER — TELEPHONE (OUTPATIENT)
Dept: INTERNAL MEDICINE | Facility: CLINIC | Age: 76
End: 2021-10-22

## 2021-10-22 NOTE — TELEPHONE ENCOUNTER
Per Dr. Medellin's instructions, she is to take half losartan after skipping one day of losartan. Need to call patient to let her know.

## 2021-10-22 NOTE — TELEPHONE ENCOUNTER
Spoke to patient & she clarified that she did not take her losartan today & got a reading of 134/82. Advised that she check it again tomorrow & if it stays los 130's/80's to stay off of it until she comes in to see Dr. Medellin. If it starts going up then start losartan 25mg. Pt verbalized understanding.

## 2021-10-22 NOTE — TELEPHONE ENCOUNTER
PATIENT WAS CALLING TO TELL OFFICE THAT SHE DID NOT TAKE A PILL YESTERDAY AND HER BLOOD PRESSURE IS /82    CALL: [phone]

## 2021-10-27 ENCOUNTER — OFFICE VISIT (OUTPATIENT)
Dept: INTERNAL MEDICINE | Facility: CLINIC | Age: 76
End: 2021-10-27

## 2021-10-27 VITALS
BODY MASS INDEX: 25.39 KG/M2 | DIASTOLIC BLOOD PRESSURE: 74 MMHG | OXYGEN SATURATION: 97 % | RESPIRATION RATE: 16 BRPM | SYSTOLIC BLOOD PRESSURE: 122 MMHG | HEIGHT: 66 IN | HEART RATE: 66 BPM | TEMPERATURE: 97.1 F | WEIGHT: 158 LBS

## 2021-10-27 DIAGNOSIS — Z23 NEED FOR VACCINATION: ICD-10-CM

## 2021-10-27 DIAGNOSIS — I10 ESSENTIAL HYPERTENSION: Primary | Chronic | ICD-10-CM

## 2021-10-27 PROCEDURE — 99213 OFFICE O/P EST LOW 20 MIN: CPT | Performed by: INTERNAL MEDICINE

## 2021-10-27 PROCEDURE — 90662 IIV NO PRSV INCREASED AG IM: CPT | Performed by: INTERNAL MEDICINE

## 2021-10-27 PROCEDURE — G0008 ADMIN INFLUENZA VIRUS VAC: HCPCS | Performed by: INTERNAL MEDICINE

## 2021-10-27 NOTE — PROGRESS NOTES
Subjective   Kamilla Hawthorne is a 75 y.o. female.     Chief Complaint   Patient presents with   • Hypertension     Low Readings         In today with issues with orthostatic hypotension.  This goes back about 1 year.  6 days ago she had lightheadedness and had to sit down the one morning.  She has not taken her medicine since and blood pressures are running fairly normal at home.  No more lightheaded spells.       The following portions of the patient's history were reviewed and updated as appropriate: allergies, current medications, past social history and problem list.    Outpatient Medications Marked as Taking for the 10/27/21 encounter (Office Visit) with Ishmael Medellin MD   Medication Sig Dispense Refill   • atorvastatin (LIPITOR) 10 MG tablet TAKE 1 TABLET BY MOUTH EVERY DAY 90 tablet 1   • cholecalciferol (VITAMIN D3) 1000 units tablet Take 1,000 Units by mouth Daily.     • dorzolamide-timolol (COSOPT) 22.3-6.8 MG/ML ophthalmic solution INSTILL 1 DROP BY OPHTHALMIC ROUTE 2 TIMES EVERY DAY INTO BOTH EYES  6   • latanoprost (XALATAN) 0.005 % ophthalmic solution INSTILL 1 DROP BY OPHTHALMIC ROUTE EVERY DAY BOTH EYES AT BEDTIME  6   • melatonin 3 MG tablet Take 3 mg by mouth Every Night.     • vitamin C (ASCORBIC ACID) 500 MG tablet Take 500 mg by mouth Daily.         Review of Systems   Neurological: Positive for weakness and light-headedness.       Objective   Vitals:    10/27/21 1054   BP: 122/74   Pulse: 66   Resp: 16   Temp: 97.1 °F (36.2 °C)   SpO2: 97%      Wt Readings from Last 3 Encounters:   10/27/21 71.7 kg (158 lb)   09/09/21 70.3 kg (155 lb)   02/26/21 64 kg (141 lb)    Body mass index is 25.5 kg/m².      Physical Exam  Constitutional:       Appearance: Normal appearance.   Cardiovascular:      Rate and Rhythm: Regular rhythm.      Heart sounds: No murmur heard.  No gallop.    Pulmonary:      Effort: Pulmonary effort is normal. No respiratory distress.      Breath sounds: No wheezing or rales.    Neurological:      Mental Status: She is alert.           Problems Addressed this Visit        Cardiac and Vasculature    Essential hypertension - Primary (Chronic)      Other Visit Diagnoses     Need for vaccination          Diagnoses       Codes Comments    Essential hypertension    -  Primary ICD-10-CM: I10  ICD-9-CM: 401.9     Need for vaccination     ICD-10-CM: Z23  ICD-9-CM: V05.9         Assessment/Plan   In with a spell of orthostatic hypotension 6 days ago.  She stopped her losartan 25 mg tablet at that point and has had no more spells since then.  Blood pressures have been running normal.  I discussed with patient and family the issues with orthostasis today.  We will leave off the losartan at this point in time.  Blood pressure may go up however.  She might be a good candidate for concurrent treatment with midodrine.  For now she will simply monitor her blood pressure at home.    The above information was reviewed again today 10/27/21.  It continues to be accurate as reflected above and is unchanged.  History, physical and review of systems all reviewed and are unchanged.  Medications were reviewed today and continue the current dosing.    PPE today includes face mask and eye shield.         Dragon disclaimer:   Much of this encounter note is an electronic transcription/translation of spoken language to printed text. The electronic translation of spoken language may permit erroneous, or at times, nonsensical words or phrases to be inadvertently transcribed; Although I have reviewed the note for such errors, some may still exist.

## 2021-10-27 NOTE — PATIENT INSTRUCTIONS
Influenza (Flu) Vaccine (Inactivated or Recombinant): What You Need to Know  1. Why get vaccinated?  Influenza vaccine can prevent influenza (flu).  Flu is a contagious disease that spreads around the [place] every year, usually between October and May. Anyone can get the flu, but it is more dangerous for some people. Infants and young children, people 65 years of age and older, pregnant women, and people with certain health conditions or a weakened immune system are at greatest risk of flu complications.  Pneumonia, bronchitis, sinus infections and ear infections are examples of flu-related complications. If you have a medical condition, such as heart disease, cancer or diabetes, flu can make it worse.  Flu can cause fever and chills, sore throat, muscle aches, fatigue, cough, headache, and runny or stuffy nose. Some people may have vomiting and diarrhea, though this is more common in children than adults.  Each year thousands of people in the [place] die from flu, and many more are hospitalized. Flu vaccine prevents millions of illnesses and flu-related visits to the doctor each year.  2. Influenza vaccine  CDC recommends everyone 6 months of age and older get vaccinated every flu season. Children 6 months through 8 years of age may need 2 doses during a single flu season. Everyone else needs only 1 dose each flu season.  It takes about 2 weeks for protection to develop after vaccination.  There are many flu viruses, and they are always changing. Each year a new flu vaccine is made to protect against three or four viruses that are likely to cause disease in the upcoming flu season. Even when the vaccine doesn't exactly match these viruses, it may still provide some protection.  Influenza vaccine does not cause flu.  Influenza vaccine may be given at the same time as other vaccines.  3. Talk with your health care provider  Tell your vaccine provider if the person getting the vaccine:  · Has had an  allergic reaction after a previous dose of influenza vaccine, or has any severe, life-threatening allergies.  · Has ever had Guillain-Barré Syndrome (also called GBS).  In some cases, your health care provider may decide to postpone influenza vaccination to a future visit.  People with minor illnesses, such as a cold, may be vaccinated. People who are moderately or severely ill should usually wait until they recover before getting influenza vaccine.  Your health care provider can give you more information.  4. Risks of a vaccine reaction  · Soreness, redness, and swelling where shot is given, fever, muscle aches, and headache can happen after influenza vaccine.  · There may be a very small increased risk of Guillain-Barré Syndrome (GBS) after inactivated influenza vaccine (the flu shot).  Young children who get the flu shot along with pneumococcal vaccine (PCV13), and/or DTaP vaccine at the same time might be slightly more likely to have a seizure caused by fever. Tell your health care provider if a child who is getting flu vaccine has ever had a seizure.  People sometimes faint after medical procedures, including vaccination. Tell your provider if you feel dizzy or have vision changes or ringing in the ears.  As with any medicine, there is a very remote chance of a vaccine causing a severe allergic reaction, other serious injury, or death.  5. What if there is a serious problem?  An allergic reaction could occur after the vaccinated person leaves the clinic. If you see signs of a severe allergic reaction (hives, swelling of the face and throat, difficulty breathing, a fast heartbeat, dizziness, or weakness), call 9-1-1 and get the person to the nearest hospital.  For other signs that concern you, call your health care provider.  Adverse reactions should be reported to the Vaccine Adverse Event Reporting System (VAERS). Your health care provider will usually file this report, or you can do it yourself. Visit the  VAERS website at www.vaers.[facility].gov or call 1-612.964.5131. VAERS is only for reporting reactions, and VAERS staff do not give medical advice.  6. The National Vaccine Injury Compensation Program  The National Vaccine Injury Compensation Program (VICP) is a federal program that was created to compensate people who may have been injured by certain vaccines. Visit the VICP website at www.[facility].gov/vaccinecompensation or call 1-396.769.5830 to learn about the program and about filing a claim. There is a time limit to file a claim for compensation.  7. How can I learn more?  · Ask your healthcare provider.  · Call your local or state health department.  · Contact the Centers for Disease Control and Prevention (CDC):  ? Call 1-561.673.2363 (1-209-FOI-INFO) or  ? Visit CDC's www.cdc.gov/flu  Vaccine Information Statement (Interim) Inactivated Influenza Vaccine (8/15/2019)  This information is not intended to replace advice given to you by your health care provider. Make sure you discuss any questions you have with your health care provider.  Document Revised: 12/09/2020 Document Reviewed: 12/09/2020  Elsevier Patient Education © 2021 [employer].

## 2021-12-29 DIAGNOSIS — I10 ESSENTIAL HYPERTENSION: ICD-10-CM

## 2021-12-29 RX ORDER — LOSARTAN POTASSIUM 50 MG/1
TABLET ORAL
Qty: 90 TABLET | Refills: 1 | OUTPATIENT
Start: 2021-12-29

## 2022-01-28 DIAGNOSIS — I10 ESSENTIAL HYPERTENSION: ICD-10-CM

## 2022-01-28 RX ORDER — LOSARTAN POTASSIUM 50 MG/1
TABLET ORAL
Qty: 90 TABLET | Refills: 1 | OUTPATIENT
Start: 2022-01-28

## 2022-03-09 ENCOUNTER — OFFICE VISIT (OUTPATIENT)
Dept: INTERNAL MEDICINE | Facility: CLINIC | Age: 77
End: 2022-03-09

## 2022-03-09 VITALS
HEART RATE: 73 BPM | BODY MASS INDEX: 25.17 KG/M2 | WEIGHT: 156.6 LBS | OXYGEN SATURATION: 98 % | DIASTOLIC BLOOD PRESSURE: 93 MMHG | HEIGHT: 66 IN | RESPIRATION RATE: 16 BRPM | TEMPERATURE: 97.8 F | SYSTOLIC BLOOD PRESSURE: 127 MMHG

## 2022-03-09 DIAGNOSIS — I10 ESSENTIAL HYPERTENSION: Primary | Chronic | ICD-10-CM

## 2022-03-09 DIAGNOSIS — E78.2 MIXED HYPERLIPIDEMIA: Chronic | ICD-10-CM

## 2022-03-09 PROCEDURE — 99213 OFFICE O/P EST LOW 20 MIN: CPT | Performed by: INTERNAL MEDICINE

## 2022-03-09 NOTE — PROGRESS NOTES
Subjective   Kamilla Hawthorne is a 76 y.o. female.     Chief Complaint   Patient presents with   • Hypertension         In today for recheck of hypertension.  She had high blood pressure for about 6 months on a tiny dose of losartan.  Had some problems with hypotension and stopped it.  Blood pressures have been acceptable since then.  Generally in the 120-135/80 range.  She was also taking melatonin at the same time so is hard to say which caused her issues.  Also in for hyperlipidemia.       The following portions of the patient's history were reviewed and updated as appropriate: allergies, current medications, past social history and problem list.    Outpatient Medications Marked as Taking for the 3/9/22 encounter (Office Visit) with Ishmael Medellin MD   Medication Sig Dispense Refill   • atorvastatin (LIPITOR) 10 MG tablet TAKE 1 TABLET BY MOUTH EVERY DAY 90 tablet 1   • cholecalciferol (VITAMIN D3) 1000 units tablet Take 1,000 Units by mouth Daily.     • dorzolamide-timolol (COSOPT) 22.3-6.8 MG/ML ophthalmic solution INSTILL 1 DROP BY OPHTHALMIC ROUTE 2 TIMES EVERY DAY INTO BOTH EYES  6   • latanoprost (XALATAN) 0.005 % ophthalmic solution INSTILL 1 DROP BY OPHTHALMIC ROUTE EVERY DAY BOTH EYES AT BEDTIME  6   • vitamin C (ASCORBIC ACID) 500 MG tablet Take 500 mg by mouth Daily.         Review of Systems   Respiratory: Negative for cough, shortness of breath and wheezing.    Cardiovascular: Negative for chest pain, palpitations and leg swelling.   Gastrointestinal: Negative for abdominal pain, constipation, diarrhea and nausea.       Objective   Vitals:    03/09/22 1100   BP: 127/93   Pulse: 73   Resp: 16   Temp: 97.8 °F (36.6 °C)   SpO2: 98%      Wt Readings from Last 3 Encounters:   03/09/22 71 kg (156 lb 9.6 oz)   10/27/21 71.7 kg (158 lb)   09/09/21 70.3 kg (155 lb)    Body mass index is 25.29 kg/m².      Physical Exam  Constitutional:       Appearance: Normal appearance. She is well-developed.   Neck:       Thyroid: No thyromegaly.   Cardiovascular:      Rate and Rhythm: Normal rate and regular rhythm.      Heart sounds: Normal heart sounds. No murmur heard.    No gallop.   Pulmonary:      Effort: Pulmonary effort is normal. No respiratory distress.      Breath sounds: Normal breath sounds. No wheezing or rales.   Neurological:      Mental Status: She is alert.           Problems Addressed this Visit        Cardiac and Vasculature    Essential hypertension - Primary (Chronic)    Mixed hyperlipidemia (Chronic)      Diagnoses       Codes Comments    Essential hypertension    -  Primary ICD-10-CM: I10  ICD-9-CM: 401.9     Mixed hyperlipidemia     ICD-10-CM: E78.2  ICD-9-CM: 272.2         Assessment/Plan   In today for recheck of hypertension hyperlipidemia.  Right now blood per she seems to be decent off of treatment and we will simply observe it for now.  Gets annual lab work in September 2022.  She had a normal bone scan in 2016 and hence not need to repeat until 2031.  We will plan annual preventive exam September 2022.  She remains on atorvastatin 10 mg daily for lipids and that is reviewed today.    The above information was reviewed again today 03/09/22.  It continues to be accurate as reflected above and is unchanged.  History, physical and review of systems all reviewed and are unchanged.  Medications were reviewed today and continue the current dosing.    PPE today includes face mask and eye shield.               Dragon disclaimer:   Much of this encounter note is an electronic transcription/translation of spoken language to printed text. The electronic translation of spoken language may permit erroneous, or at times, nonsensical words or phrases to be inadvertently transcribed; Although I have reviewed the note for such errors, some may still exist.

## 2022-03-17 ENCOUNTER — TRANSCRIBE ORDERS (OUTPATIENT)
Dept: ADMINISTRATIVE | Facility: HOSPITAL | Age: 77
End: 2022-03-17

## 2022-03-17 DIAGNOSIS — Z12.31 VISIT FOR SCREENING MAMMOGRAM: Primary | ICD-10-CM

## 2022-05-05 DIAGNOSIS — E78.2 MIXED HYPERLIPIDEMIA: ICD-10-CM

## 2022-05-05 RX ORDER — ATORVASTATIN CALCIUM 10 MG/1
TABLET, FILM COATED ORAL
Qty: 90 TABLET | Refills: 1 | Status: SHIPPED | OUTPATIENT
Start: 2022-05-05 | End: 2023-03-09 | Stop reason: SDUPTHER

## 2022-06-03 ENCOUNTER — APPOINTMENT (OUTPATIENT)
Dept: MAMMOGRAPHY | Facility: HOSPITAL | Age: 77
End: 2022-06-03

## 2022-06-07 ENCOUNTER — HOSPITAL ENCOUNTER (OUTPATIENT)
Dept: MAMMOGRAPHY | Facility: HOSPITAL | Age: 77
Discharge: HOME | End: 2022-06-07
Admitting: INTERNAL MEDICINE

## 2022-06-07 DIAGNOSIS — Z12.31 VISIT FOR SCREENING MAMMOGRAM: ICD-10-CM

## 2022-06-07 PROCEDURE — 77067 SCR MAMMO BI INCL CAD: CPT

## 2022-06-07 PROCEDURE — 77063 BREAST TOMOSYNTHESIS BI: CPT

## 2022-09-13 ENCOUNTER — OFFICE VISIT (OUTPATIENT)
Dept: INTERNAL MEDICINE | Facility: CLINIC | Age: 77
End: 2022-09-13

## 2022-09-13 VITALS
TEMPERATURE: 97.7 F | BODY MASS INDEX: 25.39 KG/M2 | SYSTOLIC BLOOD PRESSURE: 120 MMHG | HEIGHT: 66 IN | DIASTOLIC BLOOD PRESSURE: 70 MMHG | RESPIRATION RATE: 16 BRPM | WEIGHT: 158 LBS | HEART RATE: 64 BPM | OXYGEN SATURATION: 99 %

## 2022-09-13 DIAGNOSIS — Z00.00 ENCOUNTER FOR PREVENTIVE HEALTH EXAMINATION: Primary | ICD-10-CM

## 2022-09-13 DIAGNOSIS — Z00.00 ENCOUNTER FOR ANNUAL WELLNESS EXAM IN MEDICARE PATIENT: ICD-10-CM

## 2022-09-13 DIAGNOSIS — E78.2 MIXED HYPERLIPIDEMIA: Chronic | ICD-10-CM

## 2022-09-13 DIAGNOSIS — I10 ESSENTIAL HYPERTENSION: Chronic | ICD-10-CM

## 2022-09-13 PROCEDURE — G0439 PPPS, SUBSEQ VISIT: HCPCS | Performed by: INTERNAL MEDICINE

## 2022-09-13 PROCEDURE — 99397 PER PM REEVAL EST PAT 65+ YR: CPT | Performed by: INTERNAL MEDICINE

## 2022-09-13 PROCEDURE — 1170F FXNL STATUS ASSESSED: CPT | Performed by: INTERNAL MEDICINE

## 2022-09-13 PROCEDURE — 1160F RVW MEDS BY RX/DR IN RCRD: CPT | Performed by: INTERNAL MEDICINE

## 2022-09-13 NOTE — PROGRESS NOTES
Subjective   Kamilla Hawthorne is a 76 y.o. female.     Chief Complaint   Patient presents with   • Annual Exam   • Medicare Wellness-subsequent   • Hyperlipidemia   • Hypertension         In today for annual preventive exam.  Sleep is good.  Gets 8-9 hours per night.  Exercises 3-4 days/week.  Energy is good.  Diet is well-balanced.  She has had 3 syncopal episodes or near syncopal episodes in the past 2 years.  The most recent was August 2021.  The first spell was after having some wine when she was upset in 2020.  The last 2 spells seem to occur after she had skipped lunch and was running around.  One occurred at [employer].  She got hot and sweaty and had to sit down.  Buckled to her knees.  Hard to say if there was full loss of consciousness.  That was the only time she actually lost posture.  No spells in the past year.    Hyperlipidemia  This is a chronic problem. The current episode started more than 1 year ago. Pertinent negatives include no chest pain, myalgias or shortness of breath.   Hypertension  This is a chronic problem. The current episode started more than 1 year ago. Pertinent negatives include no chest pain, headaches, palpitations or shortness of breath.        The following portions of the patient's history were reviewed and updated as appropriate: allergies, current medications, past social history and problem list.    Outpatient Medications Marked as Taking for the 9/13/22 encounter (Office Visit) with Ishmael Medellin MD   Medication Sig Dispense Refill   • atorvastatin (LIPITOR) 10 MG tablet TAKE 1 TABLET BY MOUTH EVERY DAY 90 tablet 1   • cholecalciferol (VITAMIN D3) 1000 units tablet Take 1,000 Units by mouth Daily.     • dorzolamide-timolol (COSOPT) 22.3-6.8 MG/ML ophthalmic solution INSTILL 1 DROP BY OPHTHALMIC ROUTE 2 TIMES EVERY DAY INTO BOTH EYES  6   • latanoprost (XALATAN) 0.005 % ophthalmic solution INSTILL 1 DROP BY OPHTHALMIC ROUTE EVERY DAY BOTH EYES AT BEDTIME  6   •  losartan (COZAAR) 25 MG tablet Take 25 mg by mouth Daily.     • melatonin 3 MG tablet Take 3 mg by mouth Every Night.     • vitamin C (ASCORBIC ACID) 500 MG tablet Take 500 mg by mouth Daily.         Review of Systems   Constitutional: Negative for appetite change, chills, diaphoresis, fatigue, fever and unexpected weight change.   HENT: Positive for hearing loss.    Respiratory: Negative for cough, chest tightness, shortness of breath and wheezing.    Cardiovascular: Negative for chest pain, palpitations and leg swelling.   Gastrointestinal: Positive for constipation. Negative for abdominal pain, anal bleeding, blood in stool, diarrhea, nausea, rectal pain and vomiting.   Endocrine: Negative for cold intolerance, heat intolerance and polyuria.   Genitourinary: Negative for difficulty urinating, dysuria, flank pain, frequency, hematuria and urgency.   Musculoskeletal: Negative for arthralgias, back pain and myalgias.   Allergic/Immunologic: Positive for environmental allergies.   Neurological: Negative for dizziness, syncope, speech difficulty, weakness, light-headedness, numbness and headaches.   Hematological: Bruises/bleeds easily.   Psychiatric/Behavioral: Negative for agitation, confusion, dysphoric mood and sleep disturbance. The patient is not nervous/anxious.        Objective   Vitals:    09/13/22 1416   BP: 120/70   Pulse: 64   Resp: 16   Temp: 97.7 °F (36.5 °C)   SpO2: 99%      Wt Readings from Last 3 Encounters:   09/13/22 71.7 kg (158 lb)   03/09/22 71 kg (156 lb 9.6 oz)   10/27/21 71.7 kg (158 lb)    Body mass index is 25.5 kg/m².      Physical Exam  Vitals and nursing note reviewed.   Constitutional:       Appearance: Normal appearance. She is well-developed.   HENT:      Right Ear: External ear normal.      Left Ear: External ear normal.      Nose: Nose normal.   Eyes:      Extraocular Movements: Extraocular movements intact.      Conjunctiva/sclera: Conjunctivae normal.      Pupils: Pupils are equal,  round, and reactive to light.   Neck:      Thyroid: No thyromegaly.      Vascular: No JVD.   Cardiovascular:      Rate and Rhythm: Normal rate and regular rhythm.      Heart sounds: Normal heart sounds. No murmur heard.    No gallop.   Pulmonary:      Effort: Pulmonary effort is normal. No respiratory distress.      Breath sounds: Normal breath sounds. No wheezing or rales.   Abdominal:      General: Bowel sounds are normal. There is no distension.      Palpations: Abdomen is soft. There is no mass.      Tenderness: There is no abdominal tenderness. There is no guarding.      Hernia: No hernia is present.   Musculoskeletal:         General: Normal range of motion.      Cervical back: Normal range of motion and neck supple.   Lymphadenopathy:      Cervical: No cervical adenopathy.   Skin:     General: Skin is warm and dry.   Neurological:      General: No focal deficit present.      Mental Status: She is alert and oriented to person, place, and time.      Cranial Nerves: No cranial nerve deficit.      Coordination: Coordination normal.      Deep Tendon Reflexes: Reflexes normal.   Psychiatric:         Mood and Affect: Mood normal.         Behavior: Behavior normal.         Thought Content: Thought content normal.         Judgment: Judgment normal.       Procedures      Problems Addressed this Visit        Cardiac and Vasculature    Essential hypertension (Chronic)    Mixed hyperlipidemia (Chronic)      Other Visit Diagnoses     Encounter for preventive health examination    -  Primary    Encounter for annual wellness exam in Medicare patient          Diagnoses       Codes Comments    Encounter for preventive health examination    -  Primary ICD-10-CM: Z00.00  ICD-9-CM: V70.0     Encounter for annual wellness exam in Medicare patient     ICD-10-CM: Z00.00  ICD-9-CM: V70.0     Essential hypertension     ICD-10-CM: I10  ICD-9-CM: 401.9     Mixed hyperlipidemia     ICD-10-CM: E78.2  ICD-9-CM: 272.2         Assessment &  Plan   In today for annual preventive exam September 2022.  Annual wellness visit today September 2022.  She has a history of hypertension and hyperlipidemia.  Family history of colon cancer and personal history of adenomatous colon polyps.  Distant history of malignant melanoma.  She relates 3 near syncopal episodes between 2019 and 2021.  They sound like pretty typical vasovagal episodes.  We checked a TTE and a 24-hour Holter.  It does not appear that dehydration was an issue.  I suppose she might of accidentally overtaken her medicine.  She is due for comprehensive lab work today September 2022 including a CBC, CMP, lipid profile, TSH.  We will plan to monitor at 6-month intervals.  She is 5.5 HPP one half of banana.  She is due for Prevnar 20 to complete her series    Prevention counseling was performed today. The counseling performed was routine health maintenance topics including BMI and exercise.    The above information was reviewed again today 09/13/22.  It continues to be accurate as reflected above and is unchanged.  History, physical and review of systems all reviewed and are unchanged.  Medications were reviewed today and continue the current dosing.    PPE today includes face mask and eye shield.         Dragon disclaimer:   Much of this encounter note is an electronic transcription/translation of spoken language to printed text. The electronic translation of spoken language may permit erroneous, or at times, nonsensical words or phrases to be inadvertently transcribed; Although I have reviewed the note for such errors, some may still exist.

## 2022-09-13 NOTE — PROGRESS NOTES
The ABCs of the Annual Wellness Visit  Subsequent Medicare Wellness Visit    Chief Complaint   Patient presents with   • Annual Exam   • Medicare Wellness-subsequent   • Hyperlipidemia   • Hypertension      Subjective    History of Present Illness:  Kamilla Hawthorne is a 76 y.o. female who presents for a Subsequent Medicare Wellness Visit.    The following portions of the patient's history were reviewed and   updated as appropriate: allergies, current medications, past family history, past medical history, past social history, past surgical history and problem list.    Compared to one year ago, the patient feels her physical   health is the same.    Compared to one year ago, the patient feels her mental   health is the same.    Recent Hospitalizations:  She was not admitted to the hospital during the last year.       Current Medical Providers:  Patient Care Team:  Ishmael Medellin MD as PCP - General (Internal Medicine)  Ishmael Lozano Jr., MD (Dermatology)  Suhail Rothman MD as Consulting Physician (Gastroenterology)    Outpatient Medications Prior to Visit   Medication Sig Dispense Refill   • atorvastatin (LIPITOR) 10 MG tablet TAKE 1 TABLET BY MOUTH EVERY DAY 90 tablet 1   • cholecalciferol (VITAMIN D3) 1000 units tablet Take 1,000 Units by mouth Daily.     • dorzolamide-timolol (COSOPT) 22.3-6.8 MG/ML ophthalmic solution INSTILL 1 DROP BY OPHTHALMIC ROUTE 2 TIMES EVERY DAY INTO BOTH EYES  6   • latanoprost (XALATAN) 0.005 % ophthalmic solution INSTILL 1 DROP BY OPHTHALMIC ROUTE EVERY DAY BOTH EYES AT BEDTIME  6   • losartan (COZAAR) 25 MG tablet Take 25 mg by mouth Daily.     • melatonin 3 MG tablet Take 3 mg by mouth Every Night.     • vitamin C (ASCORBIC ACID) 500 MG tablet Take 500 mg by mouth Daily.       No facility-administered medications prior to visit.       No opioid medication identified on active medication list. I have reviewed chart for other potential  high risk medication/s and harmful drug  "interactions in the elderly.          Aspirin is not on active medication list.  Aspirin use is not indicated based on review of current medical condition/s. Risk of harm outweighs potential benefits.  .    Patient Active Problem List   Diagnosis   • Essential hypertension   • History of melanoma   • History of adenomatous polyp of colon   • Family history of colon cancer   • Mixed hyperlipidemia     Advance Care Planning  Advance Directive is not on file.  ACP discussion was held with the patient during this visit. Patient has an advance directive (not in EMR), copy requested.          Objective    Vitals:    09/13/22 1416   Resp: 16   Temp: 97.7 °F (36.5 °C)   TempSrc: Temporal   Weight: 71.7 kg (158 lb)   Height: 167.6 cm (66\")     Estimated body mass index is 25.5 kg/m² as calculated from the following:    Height as of this encounter: 167.6 cm (66\").    Weight as of this encounter: 71.7 kg (158 lb).    BMI is >= 25 and <30. (Overweight) The following options were offered after discussion;: exercise counseling/recommendations      Does the patient have evidence of cognitive impairment? No    Physical Exam            HEALTH RISK ASSESSMENT    Smoking Status:  Social History     Tobacco Use   Smoking Status Former Smoker   • Types: Cigarettes   Smokeless Tobacco Never Used     Alcohol Consumption:  Social History     Substance and Sexual Activity   Alcohol Use Yes    Comment: SOCIAL     Fall Risk Screen:    STEADI Fall Risk Assessment has not been completed.    Depression Screening:  PHQ-2/PHQ-9 Depression Screening 3/9/2022   Retired PHQ-9 Total Score -   Retired Total Score -   Little Interest or Pleasure in Doing Things 0-->not at all   Feeling Down, Depressed or Hopeless 0-->not at all   PHQ-9: Brief Depression Severity Measure Score 0       Health Habits and Functional and Cognitive Screening:  Functional & Cognitive Status 9/9/2021   Do you have difficulty preparing food and eating? No   Do you have " difficulty bathing yourself, getting dressed or grooming yourself? No   Do you have difficulty using the toilet? No   Do you have difficulty moving around from place to place? No   Do you have trouble with steps or getting out of a bed or a chair? No   Current Diet Well Balanced Diet   Dental Exam Up to date   Eye Exam Up to date   Exercise (times per week) 7 times per week   Current Exercises Include Walking   Current Exercise Activities Include -   Do you need help using the phone?  No   Are you deaf or do you have serious difficulty hearing?  No   Do you need help with transportation? No   Do you need help shopping? No   Do you need help preparing meals?  No   Do you need help with housework?  No   Do you need help with laundry? No   Do you need help taking your medications? No   Do you need help managing money? No   Do you ever drive or ride in a car without wearing a seat belt? No   Have you felt unusual stress, anger or loneliness in the last month? No   Who do you live with? Spouse   If you need help, do you have trouble finding someone available to you? No   Have you been bothered in the last four weeks by sexual problems? No   Do you have difficulty concentrating, remembering or making decisions? -       Age-appropriate Screening Schedule:  Refer to the list below for future screening recommendations based on patient's age, sex and/or medical conditions. Orders for these recommended tests are listed in the plan section. The patient has been provided with a written plan.    Health Maintenance   Topic Date Due   • LIPID PANEL  09/09/2022   • DXA SCAN  08/11/2031 (Originally 8/11/2018)   • INFLUENZA VACCINE  10/01/2022   • MAMMOGRAM  06/07/2024   • TDAP/TD VACCINES (2 - Td or Tdap) 01/01/2027   • ZOSTER VACCINE  Completed              Assessment & Plan   CMS Preventative Services Quick Reference  Risk Factors Identified During Encounter  Fall Risk-High or Moderate  The above risks/problems have been discussed  with the patient.  Follow up actions/plans if indicated are seen below in the Assessment/Plan Section.  Pertinent information has been shared with the patient in the After Visit Summary.    There are no diagnoses linked to this encounter.    Follow Up:   No follow-ups on file.     An After Visit Summary and PPPS were made available to the patient.          I spent 15 minutes caring for Kamilla on this date of service. This time includes time spent by me in the following activities:preparing for the visit

## 2022-09-14 LAB
ALBUMIN SERPL-MCNC: 4.6 G/DL (ref 3.5–5.2)
ALBUMIN/GLOB SERPL: 2.4 G/DL
ALP SERPL-CCNC: 85 U/L (ref 39–117)
ALT SERPL-CCNC: 18 U/L (ref 1–33)
AST SERPL-CCNC: 21 U/L (ref 1–32)
BASOPHILS # BLD AUTO: 0.04 10*3/MM3 (ref 0–0.2)
BASOPHILS NFR BLD AUTO: 0.6 % (ref 0–1.5)
BILIRUB SERPL-MCNC: 1.5 MG/DL (ref 0–1.2)
BUN SERPL-MCNC: 12 MG/DL (ref 8–23)
BUN/CREAT SERPL: 19.7 (ref 7–25)
CALCIUM SERPL-MCNC: 9.6 MG/DL (ref 8.6–10.5)
CHLORIDE SERPL-SCNC: 104 MMOL/L (ref 98–107)
CHOLEST SERPL-MCNC: 163 MG/DL (ref 0–200)
CHOLEST/HDLC SERPL: 1.96 {RATIO}
CO2 SERPL-SCNC: 28 MMOL/L (ref 22–29)
CREAT SERPL-MCNC: 0.61 MG/DL (ref 0.57–1)
EGFRCR-CYS SERPLBLD CKD-EPI 2021: 92.8 ML/MIN/1.73
EOSINOPHIL # BLD AUTO: 0.06 10*3/MM3 (ref 0–0.4)
EOSINOPHIL NFR BLD AUTO: 1 % (ref 0.3–6.2)
ERYTHROCYTE [DISTWIDTH] IN BLOOD BY AUTOMATED COUNT: 13.1 % (ref 12.3–15.4)
GLOBULIN SER CALC-MCNC: 1.9 GM/DL
GLUCOSE SERPL-MCNC: 85 MG/DL (ref 65–99)
HCT VFR BLD AUTO: 44.4 % (ref 34–46.6)
HDLC SERPL-MCNC: 83 MG/DL (ref 40–60)
HGB BLD-MCNC: 14.4 G/DL (ref 12–15.9)
IMM GRANULOCYTES # BLD AUTO: 0.01 10*3/MM3 (ref 0–0.05)
IMM GRANULOCYTES NFR BLD AUTO: 0.2 % (ref 0–0.5)
LDLC SERPL CALC-MCNC: 68 MG/DL (ref 0–100)
LYMPHOCYTES # BLD AUTO: 2.37 10*3/MM3 (ref 0.7–3.1)
LYMPHOCYTES NFR BLD AUTO: 38.3 % (ref 19.6–45.3)
MCH RBC QN AUTO: 31 PG (ref 26.6–33)
MCHC RBC AUTO-ENTMCNC: 32.4 G/DL (ref 31.5–35.7)
MCV RBC AUTO: 95.5 FL (ref 79–97)
MONOCYTES # BLD AUTO: 0.36 10*3/MM3 (ref 0.1–0.9)
MONOCYTES NFR BLD AUTO: 5.8 % (ref 5–12)
NEUTROPHILS # BLD AUTO: 3.34 10*3/MM3 (ref 1.7–7)
NEUTROPHILS NFR BLD AUTO: 54.1 % (ref 42.7–76)
NRBC BLD AUTO-RTO: 0 /100 WBC (ref 0–0.2)
PLATELET # BLD AUTO: 226 10*3/MM3 (ref 140–450)
POTASSIUM SERPL-SCNC: 4.5 MMOL/L (ref 3.5–5.2)
PROT SERPL-MCNC: 6.5 G/DL (ref 6–8.5)
RBC # BLD AUTO: 4.65 10*6/MM3 (ref 3.77–5.28)
SODIUM SERPL-SCNC: 142 MMOL/L (ref 136–145)
TRIGL SERPL-MCNC: 63 MG/DL (ref 0–150)
TSH SERPL DL<=0.005 MIU/L-ACNC: 1.58 UIU/ML (ref 0.27–4.2)
VLDLC SERPL CALC-MCNC: 12 MG/DL (ref 5–40)
WBC # BLD AUTO: 6.18 10*3/MM3 (ref 3.4–10.8)

## 2023-03-09 ENCOUNTER — OFFICE VISIT (OUTPATIENT)
Dept: INTERNAL MEDICINE | Facility: CLINIC | Age: 78
End: 2023-03-09
Payer: MEDICARE

## 2023-03-09 VITALS
BODY MASS INDEX: 25.23 KG/M2 | DIASTOLIC BLOOD PRESSURE: 60 MMHG | WEIGHT: 157 LBS | HEART RATE: 67 BPM | HEIGHT: 66 IN | SYSTOLIC BLOOD PRESSURE: 110 MMHG | TEMPERATURE: 97.3 F | OXYGEN SATURATION: 100 %

## 2023-03-09 DIAGNOSIS — I10 ESSENTIAL HYPERTENSION: Chronic | ICD-10-CM

## 2023-03-09 DIAGNOSIS — Z23 IMMUNIZATION DUE: Primary | ICD-10-CM

## 2023-03-09 DIAGNOSIS — E78.2 MIXED HYPERLIPIDEMIA: Chronic | ICD-10-CM

## 2023-03-09 LAB
CHOLEST SERPL-MCNC: 179 MG/DL (ref 0–200)
CHOLEST/HDLC SERPL: 2.06 {RATIO}
HDLC SERPL-MCNC: 87 MG/DL (ref 40–60)
LDLC SERPL CALC-MCNC: 81 MG/DL (ref 0–100)
TRIGL SERPL-MCNC: 57 MG/DL (ref 0–150)
VLDLC SERPL CALC-MCNC: 11 MG/DL (ref 5–40)

## 2023-03-09 PROCEDURE — G0009 ADMIN PNEUMOCOCCAL VACCINE: HCPCS | Performed by: INTERNAL MEDICINE

## 2023-03-09 PROCEDURE — 90677 PCV20 VACCINE IM: CPT | Performed by: INTERNAL MEDICINE

## 2023-03-09 PROCEDURE — 99213 OFFICE O/P EST LOW 20 MIN: CPT | Performed by: INTERNAL MEDICINE

## 2023-03-09 RX ORDER — ATORVASTATIN CALCIUM 10 MG/1
10 TABLET, FILM COATED ORAL DAILY
Qty: 90 TABLET | Refills: 1 | Status: SHIPPED | OUTPATIENT
Start: 2023-03-09

## 2023-03-09 NOTE — PROGRESS NOTES
Subjective   Kamilla Hawthorne is a 77 y.o. female.     Chief Complaint   Patient presents with   • Hyperlipidemia   • Hypertension         History of Present Illness  In today for recheck of hypertension.  She had high blood pressure for about 6 months on a tiny dose of losartan.  Had some problems with hypotension and stopped it.  Blood pressures have been acceptable since then.  Generally in the 120-135/80 range.  She was also taking melatonin at the same time so is hard to say which caused her issues.  Also in for hyperlipidemia.  Hyperlipidemia  Pertinent negatives include no chest pain or shortness of breath.   Hypertension  Pertinent negatives include no chest pain, palpitations or shortness of breath.        The following portions of the patient's history were reviewed and updated as appropriate: allergies, current medications, past social history and problem list.    Outpatient Medications Marked as Taking for the 3/9/23 encounter (Office Visit) with Ishmael Medellin MD   Medication Sig Dispense Refill   • atorvastatin (LIPITOR) 10 MG tablet TAKE 1 TABLET BY MOUTH EVERY DAY 90 tablet 1   • cholecalciferol (VITAMIN D3) 1000 units tablet Take 1 tablet by mouth Daily.     • dorzolamide-timolol (COSOPT) 22.3-6.8 MG/ML ophthalmic solution INSTILL 1 DROP BY OPHTHALMIC ROUTE 2 TIMES EVERY DAY INTO BOTH EYES  6   • latanoprost (XALATAN) 0.005 % ophthalmic solution INSTILL 1 DROP BY OPHTHALMIC ROUTE EVERY DAY BOTH EYES AT BEDTIME  6   • vitamin C (ASCORBIC ACID) 500 MG tablet Take 1 tablet by mouth Daily.         Review of Systems   Respiratory: Negative for cough, shortness of breath and wheezing.    Cardiovascular: Negative for chest pain, palpitations and leg swelling.   Gastrointestinal: Negative for abdominal pain, constipation, diarrhea and nausea.       Objective   Vitals:    03/09/23 1123   BP: 110/60   Pulse: 67   Temp: 97.3 °F (36.3 °C)   SpO2: 100%      Wt Readings from Last 3 Encounters:   03/09/23  71.2 kg (157 lb)   09/13/22 71.7 kg (158 lb)   03/09/22 71 kg (156 lb 9.6 oz)    Body mass index is 25.35 kg/m².      Physical Exam  Constitutional:       Appearance: Normal appearance. She is well-developed.   Neck:      Thyroid: No thyromegaly.   Cardiovascular:      Rate and Rhythm: Normal rate and regular rhythm.      Heart sounds: Normal heart sounds. No murmur heard.    No gallop.   Pulmonary:      Effort: Pulmonary effort is normal. No respiratory distress.      Breath sounds: Normal breath sounds. No wheezing or rales.   Neurological:      Mental Status: She is alert.           Problems Addressed this Visit        Cardiac and Vasculature    Essential hypertension (Chronic)    Mixed hyperlipidemia (Chronic)   Other Visit Diagnoses     Immunization due    -  Primary    Relevant Orders    Pneumococcal Conjugate Vaccine 20-Valent (PCV20)      Diagnoses       Codes Comments    Immunization due    -  Primary ICD-10-CM: Z23  ICD-9-CM: V05.9     Essential hypertension     ICD-10-CM: I10  ICD-9-CM: 401.9     Mixed hyperlipidemia     ICD-10-CM: E78.2  ICD-9-CM: 272.2         Assessment & Plan   In for recheck of hypertension and hyperlipidemia today March 2023.  Right now blood per she seems to be decent off of treatment and we will simply observe it for now.  Gets annual lab work in September 2023.  Annual preventive exam and annual wellness visit September 2023.  She had a normal bone scan in 2016 and hence not need to repeat until 2031.  She remains on atorvastatin 10 mg daily for lipids and that is reviewed today.  Recheck lipids today and every 6 months.  Prevnar 20 updated today.    The above information was reviewed again today 03/09/23.  It continues to be accurate as reflected above and is unchanged.  History, physical and review of systems all reviewed and are unchanged.  Medications were reviewed today and continue the current dosing.    PPE today includes face mask and eye shield.                 Dragon  disclaimer:   Much of this encounter note is an electronic transcription/translation of spoken language to printed text. The electronic translation of spoken language may permit erroneous, or at times, nonsensical words or phrases to be inadvertently transcribed; Although I have reviewed the note for such errors, some may still exist.

## 2023-05-01 ENCOUNTER — OFFICE VISIT (OUTPATIENT)
Dept: INTERNAL MEDICINE | Facility: CLINIC | Age: 78
End: 2023-05-01
Payer: MEDICARE

## 2023-05-01 ENCOUNTER — LAB (OUTPATIENT)
Dept: LAB | Facility: HOSPITAL | Age: 78
End: 2023-05-01
Payer: MEDICARE

## 2023-05-01 VITALS
BODY MASS INDEX: 25.88 KG/M2 | HEIGHT: 66 IN | HEART RATE: 63 BPM | SYSTOLIC BLOOD PRESSURE: 102 MMHG | TEMPERATURE: 97.3 F | DIASTOLIC BLOOD PRESSURE: 70 MMHG | OXYGEN SATURATION: 92 % | RESPIRATION RATE: 16 BRPM | WEIGHT: 161 LBS

## 2023-05-01 DIAGNOSIS — I95.9 HYPOTENSION, UNSPECIFIED HYPOTENSION TYPE: Primary | ICD-10-CM

## 2023-05-01 LAB
ANION GAP SERPL CALCULATED.3IONS-SCNC: 10.8 MMOL/L (ref 5–15)
BASOPHILS # BLD AUTO: 0.06 10*3/MM3 (ref 0–0.2)
BASOPHILS NFR BLD AUTO: 0.8 % (ref 0–1.5)
BUN SERPL-MCNC: 17 MG/DL (ref 8–23)
BUN/CREAT SERPL: 26.2 (ref 7–25)
CALCIUM SPEC-SCNC: 10.1 MG/DL (ref 8.6–10.5)
CHLORIDE SERPL-SCNC: 110 MMOL/L (ref 98–107)
CO2 SERPL-SCNC: 28.2 MMOL/L (ref 22–29)
CREAT SERPL-MCNC: 0.65 MG/DL (ref 0.57–1)
DEPRECATED RDW RBC AUTO: 43.1 FL (ref 37–54)
EGFRCR SERPLBLD CKD-EPI 2021: 90.8 ML/MIN/1.73
EOSINOPHIL # BLD AUTO: 0.06 10*3/MM3 (ref 0–0.4)
EOSINOPHIL NFR BLD AUTO: 0.8 % (ref 0.3–6.2)
ERYTHROCYTE [DISTWIDTH] IN BLOOD BY AUTOMATED COUNT: 12.7 % (ref 12.3–15.4)
GLUCOSE SERPL-MCNC: 90 MG/DL (ref 65–99)
HCT VFR BLD AUTO: 44.4 % (ref 34–46.6)
HGB BLD-MCNC: 15.1 G/DL (ref 12–15.9)
IMM GRANULOCYTES # BLD AUTO: 0.02 10*3/MM3 (ref 0–0.05)
IMM GRANULOCYTES NFR BLD AUTO: 0.3 % (ref 0–0.5)
LYMPHOCYTES # BLD AUTO: 2.3 10*3/MM3 (ref 0.7–3.1)
LYMPHOCYTES NFR BLD AUTO: 29.2 % (ref 19.6–45.3)
MCH RBC QN AUTO: 31.7 PG (ref 26.6–33)
MCHC RBC AUTO-ENTMCNC: 34 G/DL (ref 31.5–35.7)
MCV RBC AUTO: 93.1 FL (ref 79–97)
MONOCYTES # BLD AUTO: 0.49 10*3/MM3 (ref 0.1–0.9)
MONOCYTES NFR BLD AUTO: 6.2 % (ref 5–12)
NEUTROPHILS NFR BLD AUTO: 4.94 10*3/MM3 (ref 1.7–7)
NEUTROPHILS NFR BLD AUTO: 62.7 % (ref 42.7–76)
NRBC BLD AUTO-RTO: 0 /100 WBC (ref 0–0.2)
PLATELET # BLD AUTO: 259 10*3/MM3 (ref 140–450)
PMV BLD AUTO: 9.5 FL (ref 6–12)
POTASSIUM SERPL-SCNC: 5.5 MMOL/L (ref 3.5–5.2)
RBC # BLD AUTO: 4.77 10*6/MM3 (ref 3.77–5.28)
SODIUM SERPL-SCNC: 149 MMOL/L (ref 136–145)
WBC NRBC COR # BLD: 7.87 10*3/MM3 (ref 3.4–10.8)

## 2023-05-01 PROCEDURE — 1159F MED LIST DOCD IN RCRD: CPT | Performed by: INTERNAL MEDICINE

## 2023-05-01 PROCEDURE — 80048 BASIC METABOLIC PNL TOTAL CA: CPT | Performed by: INTERNAL MEDICINE

## 2023-05-01 PROCEDURE — 36415 COLL VENOUS BLD VENIPUNCTURE: CPT | Performed by: INTERNAL MEDICINE

## 2023-05-01 PROCEDURE — 3074F SYST BP LT 130 MM HG: CPT | Performed by: INTERNAL MEDICINE

## 2023-05-01 PROCEDURE — 1160F RVW MEDS BY RX/DR IN RCRD: CPT | Performed by: INTERNAL MEDICINE

## 2023-05-01 PROCEDURE — 3078F DIAST BP <80 MM HG: CPT | Performed by: INTERNAL MEDICINE

## 2023-05-01 PROCEDURE — 99214 OFFICE O/P EST MOD 30 MIN: CPT | Performed by: INTERNAL MEDICINE

## 2023-05-01 PROCEDURE — 85025 COMPLETE CBC W/AUTO DIFF WBC: CPT | Performed by: INTERNAL MEDICINE

## 2023-05-01 NOTE — PROGRESS NOTES
Subjective   Kamilla Hawthorne is a 77 y.o. female.     Chief Complaint   Patient presents with   • Hypotension         Hypotension  This is a new problem. The current episode started in the past 7 days. The problem occurs rarely. The problem has been resolved. Associated symptoms include diaphoresis. Pertinent negatives include no chest pain, chills or fever.        The following portions of the patient's history were reviewed and updated as appropriate: allergies, current medications, past social history and problem list.    Outpatient Medications Marked as Taking for the 5/1/23 encounter (Office Visit) with Ishmael Medellin MD   Medication Sig Dispense Refill   • atorvastatin (LIPITOR) 10 MG tablet Take 1 tablet by mouth Daily. 90 tablet 1   • cholecalciferol (VITAMIN D3) 1000 units tablet Take 1 tablet by mouth Daily.     • dorzolamide-timolol (COSOPT) 22.3-6.8 MG/ML ophthalmic solution INSTILL 1 DROP BY OPHTHALMIC ROUTE 2 TIMES EVERY DAY INTO BOTH EYES  6   • latanoprost (XALATAN) 0.005 % ophthalmic solution INSTILL 1 DROP BY OPHTHALMIC ROUTE EVERY DAY BOTH EYES AT BEDTIME  6   • vitamin C (ASCORBIC ACID) 500 MG tablet Take 1 tablet by mouth Daily.         Review of Systems   Constitutional: Positive for diaphoresis. Negative for chills and fever.   Respiratory: Negative for shortness of breath.    Cardiovascular: Negative for chest pain and palpitations.   Neurological: Positive for light-headedness.       Objective   Vitals:    05/01/23 1500   BP: 102/70   Pulse: 63   Resp: 16   Temp: 97.3 °F (36.3 °C)   SpO2: 92%      Wt Readings from Last 3 Encounters:   05/01/23 73 kg (161 lb)   03/09/23 71.2 kg (157 lb)   09/13/22 71.7 kg (158 lb)    Body mass index is 26 kg/m².      Physical Exam  Constitutional:       Appearance: Normal appearance. She is well-developed.   Neck:      Thyroid: No thyromegaly.   Cardiovascular:      Rate and Rhythm: Normal rate and regular rhythm.      Heart sounds: Normal heart  sounds. No murmur heard.    No gallop.   Pulmonary:      Effort: Pulmonary effort is normal. No respiratory distress.      Breath sounds: Normal breath sounds. No wheezing or rales.   Abdominal:      General: Bowel sounds are normal.      Palpations: Abdomen is soft. There is no mass.      Tenderness: There is no abdominal tenderness. There is no guarding.   Neurological:      Mental Status: She is alert.           Problems Addressed this Visit    None  Visit Diagnoses     Hypotension, unspecified hypotension type    -  Primary      Diagnoses       Codes Comments    Hypotension, unspecified hypotension type    -  Primary ICD-10-CM: I95.9  ICD-9-CM: 458.9         Assessment & Plan   In today with a spell of hypotension 3 days ago.  She felt lightheaded and sweaty.  Blood pressure was in the 80/40 range.  Symptoms subsided over the next 4 hours.  She was not dehydrated.  She is on the blood pressure medicine.  She was never febrile and did not feel sick otherwise.  He is not clear what might of happened to cause this hypotensive episode.  Her blood pressure cuff is off today but her symptoms fit pretty well.  Blood pressure today is in the 110/70 range and actually raises a little bit with standing and standing for a few minutes.  It is possible she had a spell of hypotension 3 days ago that is not fully explained right now.  For now she will continue to hydrate and take plenty of salt in her diet.  She will get some thigh-high support hose and continue to monitor her blood pressure at home.  We will check a CBC and BMP today.    The above information was reviewed again today 05/01/23.  It continues to be accurate as reflected above and is unchanged.  History, physical and review of systems all reviewed and are unchanged.  Medications were reviewed today and continue the current dosing.    PPE today includes face mask and eye shield.               Dragon disclaimer:   Part of this note may be an electronic  transcription/translation of spoken language to printed text using the Dragon Dictation System.

## 2023-05-08 ENCOUNTER — LAB (OUTPATIENT)
Dept: LAB | Facility: HOSPITAL | Age: 78
End: 2023-05-08
Payer: MEDICARE

## 2023-05-08 DIAGNOSIS — I95.9 HYPOTENSION, UNSPECIFIED HYPOTENSION TYPE: Primary | ICD-10-CM

## 2023-05-08 LAB
ANION GAP SERPL CALCULATED.3IONS-SCNC: 7 MMOL/L (ref 5–15)
BUN SERPL-MCNC: 10 MG/DL (ref 8–23)
BUN/CREAT SERPL: 17.2 (ref 7–25)
CALCIUM SPEC-SCNC: 9.2 MG/DL (ref 8.6–10.5)
CHLORIDE SERPL-SCNC: 104 MMOL/L (ref 98–107)
CO2 SERPL-SCNC: 29 MMOL/L (ref 22–29)
CREAT SERPL-MCNC: 0.58 MG/DL (ref 0.57–1)
EGFRCR SERPLBLD CKD-EPI 2021: 93.3 ML/MIN/1.73
GLUCOSE SERPL-MCNC: 96 MG/DL (ref 65–99)
HOLD SPECIMEN: NORMAL
POTASSIUM SERPL-SCNC: 4.8 MMOL/L (ref 3.5–5.2)
SODIUM SERPL-SCNC: 140 MMOL/L (ref 136–145)
WHOLE BLOOD HOLD SPECIMEN: NORMAL

## 2023-05-08 PROCEDURE — 36415 COLL VENOUS BLD VENIPUNCTURE: CPT

## 2023-05-08 PROCEDURE — 80048 BASIC METABOLIC PNL TOTAL CA: CPT | Performed by: INTERNAL MEDICINE

## 2023-05-22 ENCOUNTER — TELEPHONE (OUTPATIENT)
Dept: INTERNAL MEDICINE | Facility: CLINIC | Age: 78
End: 2023-05-22
Payer: MEDICARE

## 2023-05-22 DIAGNOSIS — I95.9 HYPOTENSION, UNSPECIFIED HYPOTENSION TYPE: Primary | ICD-10-CM

## 2023-05-22 NOTE — TELEPHONE ENCOUNTER
Amie Valderrama, [phone] called to report that patient is having presyncope symptoms, reports feeling clammy for about 1 year now. Requesting referral for cardiology for orthostatic hypotension evaluation. Daughter is not on HIPAA so I will contact patient to verify.    Spoke to spouse & verbalized that it was ok to talk to Amie & rerports patient's BP recheck today is 90/75 & that is going up from 80/65.    Amie would like a call back once referral is placed.

## 2023-05-26 ENCOUNTER — TRANSCRIBE ORDERS (OUTPATIENT)
Dept: ADMINISTRATIVE | Facility: HOSPITAL | Age: 78
End: 2023-05-26

## 2023-05-26 DIAGNOSIS — Z12.31 VISIT FOR SCREENING MAMMOGRAM: Primary | ICD-10-CM

## 2023-06-09 ENCOUNTER — APPOINTMENT (OUTPATIENT)
Dept: CT IMAGING | Facility: HOSPITAL | Age: 78
DRG: 264 | End: 2023-06-09
Payer: MEDICARE

## 2023-06-09 ENCOUNTER — APPOINTMENT (OUTPATIENT)
Dept: GENERAL RADIOLOGY | Facility: HOSPITAL | Age: 78
DRG: 264 | End: 2023-06-09
Payer: MEDICARE

## 2023-06-09 ENCOUNTER — HOSPITAL ENCOUNTER (INPATIENT)
Facility: HOSPITAL | Age: 78
LOS: 3 days | Discharge: HOME | DRG: 264 | End: 2023-06-15
Attending: EMERGENCY MEDICINE | Admitting: INTERNAL MEDICINE
Payer: MEDICARE

## 2023-06-09 DIAGNOSIS — S00.81XA ABRASION OF FACE, INITIAL ENCOUNTER: ICD-10-CM

## 2023-06-09 DIAGNOSIS — R91.1 PULMONARY NODULE: ICD-10-CM

## 2023-06-09 DIAGNOSIS — S09.90XA CLOSED HEAD INJURY, INITIAL ENCOUNTER: ICD-10-CM

## 2023-06-09 DIAGNOSIS — R55 SYNCOPE, UNSPECIFIED SYNCOPE TYPE: Primary | ICD-10-CM

## 2023-06-09 DIAGNOSIS — R91.8 LUNG MASS: ICD-10-CM

## 2023-06-09 LAB
ALBUMIN SERPL-MCNC: 3.8 G/DL (ref 3.5–5.2)
ALBUMIN/GLOB SERPL: 1.5 G/DL
ALP SERPL-CCNC: 81 U/L (ref 39–117)
ALT SERPL W P-5'-P-CCNC: 18 U/L (ref 1–33)
ANION GAP SERPL CALCULATED.3IONS-SCNC: 8 MMOL/L (ref 5–15)
AST SERPL-CCNC: 20 U/L (ref 1–32)
BASOPHILS # BLD AUTO: 0.05 10*3/MM3 (ref 0–0.2)
BASOPHILS NFR BLD AUTO: 0.7 % (ref 0–1.5)
BILIRUB SERPL-MCNC: 1.4 MG/DL (ref 0–1.2)
BUN SERPL-MCNC: 14 MG/DL (ref 8–23)
BUN/CREAT SERPL: 19.4 (ref 7–25)
CALCIUM SPEC-SCNC: 8.3 MG/DL (ref 8.6–10.5)
CHLORIDE SERPL-SCNC: 111 MMOL/L (ref 98–107)
CO2 SERPL-SCNC: 25 MMOL/L (ref 22–29)
CREAT SERPL-MCNC: 0.72 MG/DL (ref 0.57–1)
D DIMER PPP FEU-MCNC: 0.87 MCGFEU/ML (ref 0–0.77)
DEPRECATED RDW RBC AUTO: 44 FL (ref 37–54)
EGFRCR SERPLBLD CKD-EPI 2021: 86.2 ML/MIN/1.73
EOSINOPHIL # BLD AUTO: 0.07 10*3/MM3 (ref 0–0.4)
EOSINOPHIL NFR BLD AUTO: 0.9 % (ref 0.3–6.2)
ERYTHROCYTE [DISTWIDTH] IN BLOOD BY AUTOMATED COUNT: 12.8 % (ref 12.3–15.4)
GEN 5 2HR TROPONIN T REFLEX: 41 NG/L
GLOBULIN UR ELPH-MCNC: 2.5 GM/DL
GLUCOSE SERPL-MCNC: 114 MG/DL (ref 65–99)
HCT VFR BLD AUTO: 45.4 % (ref 34–46.6)
HGB BLD-MCNC: 15.1 G/DL (ref 12–15.9)
HOLD SPECIMEN: NORMAL
HOLD SPECIMEN: NORMAL
IMM GRANULOCYTES # BLD AUTO: 0.04 10*3/MM3 (ref 0–0.05)
IMM GRANULOCYTES NFR BLD AUTO: 0.5 % (ref 0–0.5)
LYMPHOCYTES # BLD AUTO: 1.07 10*3/MM3 (ref 0.7–3.1)
LYMPHOCYTES NFR BLD AUTO: 14 % (ref 19.6–45.3)
MAGNESIUM SERPL-MCNC: 1.8 MG/DL (ref 1.6–2.4)
MCH RBC QN AUTO: 31.2 PG (ref 26.6–33)
MCHC RBC AUTO-ENTMCNC: 33.3 G/DL (ref 31.5–35.7)
MCV RBC AUTO: 93.8 FL (ref 79–97)
MONOCYTES # BLD AUTO: 0.34 10*3/MM3 (ref 0.1–0.9)
MONOCYTES NFR BLD AUTO: 4.4 % (ref 5–12)
NEUTROPHILS NFR BLD AUTO: 6.08 10*3/MM3 (ref 1.7–7)
NEUTROPHILS NFR BLD AUTO: 79.5 % (ref 42.7–76)
NRBC BLD AUTO-RTO: 0 /100 WBC (ref 0–0.2)
PLATELET # BLD AUTO: 198 10*3/MM3 (ref 140–450)
PMV BLD AUTO: 9.5 FL (ref 6–12)
POTASSIUM SERPL-SCNC: 4.1 MMOL/L (ref 3.5–5.2)
PROT SERPL-MCNC: 6.3 G/DL (ref 6–8.5)
QT INTERVAL: 426 MS
RBC # BLD AUTO: 4.84 10*6/MM3 (ref 3.77–5.28)
SODIUM SERPL-SCNC: 144 MMOL/L (ref 136–145)
TROPONIN T DELTA: 4 NG/L
TROPONIN T SERPL HS-MCNC: 33 NG/L
TROPONIN T SERPL HS-MCNC: 37 NG/L
WBC NRBC COR # BLD: 7.65 10*3/MM3 (ref 3.4–10.8)
WHOLE BLOOD HOLD COAG: NORMAL
WHOLE BLOOD HOLD SPECIMEN: NORMAL

## 2023-06-09 PROCEDURE — 72110 X-RAY EXAM L-2 SPINE 4/>VWS: CPT

## 2023-06-09 PROCEDURE — 93005 ELECTROCARDIOGRAM TRACING: CPT

## 2023-06-09 PROCEDURE — G0378 HOSPITAL OBSERVATION PER HR: HCPCS

## 2023-06-09 PROCEDURE — 83735 ASSAY OF MAGNESIUM: CPT

## 2023-06-09 PROCEDURE — 84484 ASSAY OF TROPONIN QUANT: CPT | Performed by: INTERNAL MEDICINE

## 2023-06-09 PROCEDURE — 25010000002 ENOXAPARIN PER 10 MG: Performed by: INTERNAL MEDICINE

## 2023-06-09 PROCEDURE — 93005 ELECTROCARDIOGRAM TRACING: CPT | Performed by: EMERGENCY MEDICINE

## 2023-06-09 PROCEDURE — 71045 X-RAY EXAM CHEST 1 VIEW: CPT

## 2023-06-09 PROCEDURE — 99285 EMERGENCY DEPT VISIT HI MDM: CPT

## 2023-06-09 PROCEDURE — 84484 ASSAY OF TROPONIN QUANT: CPT

## 2023-06-09 PROCEDURE — 93005 ELECTROCARDIOGRAM TRACING: CPT | Performed by: INTERNAL MEDICINE

## 2023-06-09 PROCEDURE — 85379 FIBRIN DEGRADATION QUANT: CPT | Performed by: INTERNAL MEDICINE

## 2023-06-09 PROCEDURE — 72114 X-RAY EXAM L-S SPINE BENDING: CPT

## 2023-06-09 PROCEDURE — 85025 COMPLETE CBC W/AUTO DIFF WBC: CPT

## 2023-06-09 PROCEDURE — 80053 COMPREHEN METABOLIC PANEL: CPT

## 2023-06-09 PROCEDURE — 36415 COLL VENOUS BLD VENIPUNCTURE: CPT

## 2023-06-09 PROCEDURE — 70450 CT HEAD/BRAIN W/O DYE: CPT

## 2023-06-09 RX ORDER — SODIUM CHLORIDE 0.9 % (FLUSH) 0.9 %
10 SYRINGE (ML) INJECTION EVERY 12 HOURS SCHEDULED
Status: DISCONTINUED | OUTPATIENT
Start: 2023-06-09 | End: 2023-06-15 | Stop reason: HOSPADM

## 2023-06-09 RX ORDER — CALCIUM CARBONATE 500 MG/1
2 TABLET, CHEWABLE ORAL 3 TIMES DAILY PRN
Status: DISCONTINUED | OUTPATIENT
Start: 2023-06-09 | End: 2023-06-15 | Stop reason: HOSPADM

## 2023-06-09 RX ORDER — LATANOPROST 50 UG/ML
1 SOLUTION/ DROPS OPHTHALMIC NIGHTLY
Status: DISCONTINUED | OUTPATIENT
Start: 2023-06-09 | End: 2023-06-15 | Stop reason: HOSPADM

## 2023-06-09 RX ORDER — BISACODYL 10 MG
10 SUPPOSITORY, RECTAL RECTAL DAILY PRN
Status: DISCONTINUED | OUTPATIENT
Start: 2023-06-09 | End: 2023-06-15 | Stop reason: HOSPADM

## 2023-06-09 RX ORDER — AMOXICILLIN 250 MG
2 CAPSULE ORAL 2 TIMES DAILY
Status: DISCONTINUED | OUTPATIENT
Start: 2023-06-09 | End: 2023-06-15 | Stop reason: HOSPADM

## 2023-06-09 RX ORDER — ATORVASTATIN CALCIUM 20 MG/1
10 TABLET, FILM COATED ORAL NIGHTLY
Status: DISCONTINUED | OUTPATIENT
Start: 2023-06-09 | End: 2023-06-15 | Stop reason: HOSPADM

## 2023-06-09 RX ORDER — SODIUM CHLORIDE 0.9 % (FLUSH) 0.9 %
10 SYRINGE (ML) INJECTION AS NEEDED
Status: DISCONTINUED | OUTPATIENT
Start: 2023-06-09 | End: 2023-06-15 | Stop reason: HOSPADM

## 2023-06-09 RX ORDER — NITROGLYCERIN 0.4 MG/1
0.4 TABLET SUBLINGUAL
Status: DISCONTINUED | OUTPATIENT
Start: 2023-06-09 | End: 2023-06-15 | Stop reason: HOSPADM

## 2023-06-09 RX ORDER — SODIUM CHLORIDE 9 MG/ML
100 INJECTION, SOLUTION INTRAVENOUS CONTINUOUS
Status: DISCONTINUED | OUTPATIENT
Start: 2023-06-09 | End: 2023-06-12

## 2023-06-09 RX ORDER — POLYETHYLENE GLYCOL 3350 17 G/17G
17 POWDER, FOR SOLUTION ORAL DAILY PRN
Status: DISCONTINUED | OUTPATIENT
Start: 2023-06-09 | End: 2023-06-15 | Stop reason: HOSPADM

## 2023-06-09 RX ORDER — ENOXAPARIN SODIUM 100 MG/ML
40 INJECTION SUBCUTANEOUS DAILY
Status: DISCONTINUED | OUTPATIENT
Start: 2023-06-09 | End: 2023-06-12

## 2023-06-09 RX ORDER — SODIUM CHLORIDE 9 MG/ML
40 INJECTION, SOLUTION INTRAVENOUS AS NEEDED
Status: DISCONTINUED | OUTPATIENT
Start: 2023-06-09 | End: 2023-06-15 | Stop reason: HOSPADM

## 2023-06-09 RX ORDER — BISACODYL 5 MG/1
5 TABLET, DELAYED RELEASE ORAL DAILY PRN
Status: DISCONTINUED | OUTPATIENT
Start: 2023-06-09 | End: 2023-06-15 | Stop reason: HOSPADM

## 2023-06-09 RX ADMIN — SODIUM CHLORIDE 100 ML/HR: 9 INJECTION, SOLUTION INTRAVENOUS at 16:46

## 2023-06-09 RX ADMIN — ENOXAPARIN SODIUM 40 MG: 100 INJECTION SUBCUTANEOUS at 16:47

## 2023-06-09 RX ADMIN — Medication 10 ML: at 10:27

## 2023-06-09 RX ADMIN — ATORVASTATIN CALCIUM 10 MG: 20 TABLET, FILM COATED ORAL at 21:20

## 2023-06-09 NOTE — ED NOTES
Pt to ED s/p became clammy and lightheaded at home, passed out per family. States she fell on floor, + hematoma to L side of forehead, + hx of orthostatic hypotension. Pt a/0 x 4 at this time. Mild soreness to forehead. Was supposed to see shad keys today, states had had very rare syncopal episodes in past until recently, has had 3 episodes in last 2 weeks.

## 2023-06-09 NOTE — PLAN OF CARE
Problem: Adult Inpatient Plan of Care  Goal: Plan of Care Review  Outcome: Ongoing, Progressing  Flowsheets (Taken 6/9/2023 1454)  Progress: improving  Plan of Care Reviewed With: patient  Outcome Evaluation: Pt is a 77 year old female admitted for syncopal episodes. 3 x in the last month reported by patient. Sustained L sided head wound currently wrapped in kerlix. Passed bedside swallow eval, awaiting diet orders from provider. Pt VSS. Pt is A&Ox4. Patient reports pain but not severe pain from head. Room air.  Goal: Patient-Specific Goal (Individualized)  Outcome: Ongoing, Progressing  Goal: Absence of Hospital-Acquired Illness or Injury  Outcome: Ongoing, Progressing  Intervention: Identify and Manage Fall Risk  Recent Flowsheet Documentation  Taken 6/9/2023 1439 by Noe Franco RN  Safety Promotion/Fall Prevention:   safety round/check completed   room organization consistent   nonskid shoes/slippers when out of bed   lighting adjusted   gait belt   fall prevention program maintained   clutter free environment maintained   assistive device/personal items within reach   activity supervised  Intervention: Prevent Skin Injury  Recent Flowsheet Documentation  Taken 6/9/2023 1439 by Noe Franco RN  Body Position: sitting up in bed  Intervention: Prevent and Manage VTE (Venous Thromboembolism) Risk  Recent Flowsheet Documentation  Taken 6/9/2023 1439 by Noe Franco RN  Activity Management: activity minimized  VTE Prevention/Management: sequential compression devices off  Range of Motion: active ROM (range of motion) encouraged  Intervention: Prevent Infection  Recent Flowsheet Documentation  Taken 6/9/2023 1439 by Noe Franco RN  Infection Prevention:   single patient room provided   rest/sleep promoted  Goal: Optimal Comfort and Wellbeing  Outcome: Ongoing, Progressing  Intervention: Monitor Pain and Promote Comfort  Recent Flowsheet Documentation  Taken 6/9/2023 1439 by Noe Franco RN  Pain Management  Interventions:   no interventions per patient request   quiet environment facilitated   position adjusted   pillow support provided   care clustered  Intervention: Provide Person-Centered Care  Recent Flowsheet Documentation  Taken 6/9/2023 1439 by Noe Franco RN  Trust Relationship/Rapport:   thoughts/feelings acknowledged   reassurance provided   questions encouraged   questions answered  Goal: Readiness for Transition of Care  Outcome: Ongoing, Progressing  Intervention: Mutually Develop Transition Plan  Recent Flowsheet Documentation  Taken 6/9/2023 1436 by Noe Franco RN  Transportation Anticipated: family or friend will provide  Patient/Family Anticipated Services at Transition: none  Patient/Family Anticipates Transition to: home with family  Taken 6/9/2023 1434 by Noe Franco RN  Equipment Currently Used at Home: none     Problem: Fall Injury Risk  Goal: Absence of Fall and Fall-Related Injury  Outcome: Ongoing, Progressing  Intervention: Identify and Manage Contributors  Recent Flowsheet Documentation  Taken 6/9/2023 1439 by Noe Franco RN  Medication Review/Management: medications reviewed  Intervention: Promote Injury-Free Environment  Recent Flowsheet Documentation  Taken 6/9/2023 1439 by Noe Franco RN  Safety Promotion/Fall Prevention:   safety round/check completed   room organization consistent   nonskid shoes/slippers when out of bed   lighting adjusted   gait belt   fall prevention program maintained   clutter free environment maintained   assistive device/personal items within reach   activity supervised   Goal Outcome Evaluation:  Plan of Care Reviewed With: patient        Progress: improving  Outcome Evaluation: Pt is a 77 year old female admitted for syncopal episodes. 3 x in the last month reported by patient. Sustained L sided head wound currently wrapped in kerlix. Passed bedside swallow eval, awaiting diet orders from provider. Pt VSS. Pt is A&Ox4. Patient reports pain but not severe pain  from head. Room air.

## 2023-06-09 NOTE — ED PROVIDER NOTES
EMERGENCY DEPARTMENT ENCOUNTER    Room Number:  18/18  Date of encounter:  6/9/2023  PCP: Ishmael Medellin MD  Patient Care Team:  Ishmael Medellin MD as PCP - General (Internal Medicine)  Ishmael Lozano Jr., MD (Dermatology)  Suhail Rothman MD as Consulting Physician (Gastroenterology)   Independent Historians: Patient, family    HPI:  Chief Complaint: Syncopal episode    A complete HPI/ROS/PMH/PSH/SH/FH are unobtainable due to: Nothing    Chronic or social conditions impacting patient care (Social Determinants of Health): None  (Financial Resource Strain / Food Insecurity / Transportation Needs / Physical Activity / Stress / Social Connections / Intimate Partner Violence / Housing Stability)    Context: Kamilla Hawthorne is a 77 y.o. female who presents to the ED c/o acute episode of being clammy, lightheaded, fall, and passing out.  The patient reports that she was in the bathroom this morning and she suddenly felt very clammy and dizzy.  She states that she went to the kitchen to get her blood pressure cuff to check her blood pressure.  She reports that she leaned against a piece of furniture for support that moved and she fell.  She struck her head.  She did not lose consciousness or have a syncopal episode.  Her  came in to be next to her and she passed out after the event.  He reports he called 911.  He states that 911 took about 5 minutes to arrive and she was still unconscious at the time that she was loaded into the ambulance.  EMS reports that the patient's blood pressure was in the 80s.  She was given a liter of normal saline.  She states she is not on any blood thinners.  She took Flonase last night and other than that is only on Lipitor.  She does not take any other medicines.  She denies any pain currently.  She does report that over the last month or 2 she has had about 5-6 episodes of clamminess with low blood pressure.  She states she has been seeing her primary care doctor for this.  She had  an appointment with the cardiologist today to try to figure out the cause.  She states that during 1 of these episodes she thinks she might of hurt her back because when she moves her low back on the right feels tight.  It does not feel any different today than it has in the last several weeks.  She denies any weakness or numbness.  She reports with the prior episode she has never passed out.  Family reports she has had episodes back through 2021 without a clear cause.  She does not know when she last had a tetanus shot.    Review of prior external notes (non-ED) -and- Review of prior external test results outside of this encounter: No prior tetanus shots in our system.  Primary care note dated 5/1/2023 with an episode of hypertension 3 days prior.  No clear cause at the time.  CBC and BMP were checked.    Prescription drug monitoring program review:         PAST MEDICAL HISTORY  Active Ambulatory Problems     Diagnosis Date Noted    Essential hypertension 08/08/2017    History of melanoma 08/23/2019    History of adenomatous polyp of colon 12/02/2020    Family history of colon cancer 12/02/2020    Mixed hyperlipidemia 02/26/2021     Resolved Ambulatory Problems     Diagnosis Date Noted    Upper respiratory infection 01/03/2019    Right ear pain 01/03/2019    Cough 01/03/2019    Pink eye, left 01/03/2019    Subacute otitis media 01/03/2019     Past Medical History:   Diagnosis Date    Colon polyp     Glaucoma     Hypertension     Melanoma          PAST SURGICAL HISTORY  Past Surgical History:   Procedure Laterality Date    COLONOSCOPY      COLONOSCOPY N/A 2/4/2019    Procedure: COLONOSCOPY into cecum and TI with cold biopsy polypectomies;  Surgeon: Suhail Rothman MD;  Location: [facility] ENDOSCOPY;  Service: Gastroenterology    COLONOSCOPY N/A 2/9/2021    Procedure: COLONOSCOPY TO CECUM/TI WITH COLD BX POLYPECTOMIES;  Surgeon: Suhail Rothman MD;  Location: [facility] ENDOSCOPY;  Service: Gastroenterology;  Laterality: N/A;   PERSONAL HX OF POLYPS, FAMILY HX OF COLON CANCER  --DIVERTICULOSIS, POLYPS, INTERNAL HEMORRHOIDS     COLONOSCOPY W/ POLYPECTOMY      LASIK Left          FAMILY HISTORY  Family History   Problem Relation Age of Onset    Emphysema Mother     Stroke Father     Malig Hyperthermia Neg Hx          SOCIAL HISTORY  Social History     Socioeconomic History    Marital status:    Tobacco Use    Smoking status: Former     Types: Cigarettes    Smokeless tobacco: Never   Vaping Use    Vaping Use: Never used   Substance and Sexual Activity    Alcohol use: Yes     Comment: SOCIAL    Drug use: Never    Sexual activity: Defer         ALLERGIES  Penicillins        REVIEW OF SYSTEMS  Review of Systems  Included in HPI  All systems reviewed and negative except for those discussed in HPI.      PHYSICAL EXAM    I have reviewed the triage vital signs and nursing notes.    ED Triage Vitals   Temp Heart Rate Resp BP SpO2   06/09/23 0948 06/09/23 0948 06/09/23 0948 06/09/23 0948 06/09/23 0948   99.2 °F (37.3 °C) 72 18 154/81 100 %      Temp src Heart Rate Source Patient Position BP Location FiO2 (%)   -- 06/09/23 1030 -- -- --    Monitor          Physical Exam  GENERAL: Awake, alert, no acute distress  SKIN: Warm, dry  HENT: Normocephalic, left forehead wound which has dried blood.  She has a hematoma at the site.  EYES: no scleral icterus  CV: regular rhythm, regular rate  RESPIRATORY: normal effort, lungs clear  ABDOMEN: soft, nontender, nondistended  MUSCULOSKELETAL: no deformity no midline tenderness in her cervical, thoracic, or lumbar spines.  She has no tenderness to her hips or shoulders.  NEURO: alert, moves all extremities, follows commands                                                               LAB RESULTS  Recent Results (from the past 24 hour(s))   ECG 12 Lead Syncope    Collection Time: 06/09/23 10:05 AM   Result Value Ref Range    QT Interval 426 ms   Comprehensive Metabolic Panel    Collection Time: 06/09/23 10:27  AM    Specimen: Blood   Result Value Ref Range    Glucose 114 (H) 65 - 99 mg/dL    BUN 14 8 - 23 mg/dL    Creatinine 0.72 0.57 - 1.00 mg/dL    Sodium 144 136 - 145 mmol/L    Potassium 4.1 3.5 - 5.2 mmol/L    Chloride 111 (H) 98 - 107 mmol/L    CO2 25.0 22.0 - 29.0 mmol/L    Calcium 8.3 (L) 8.6 - 10.5 mg/dL    Total Protein 6.3 6.0 - 8.5 g/dL    Albumin 3.8 3.5 - 5.2 g/dL    ALT (SGPT) 18 1 - 33 U/L    AST (SGOT) 20 1 - 32 U/L    Alkaline Phosphatase 81 39 - 117 U/L    Total Bilirubin 1.4 (H) 0.0 - 1.2 mg/dL    Globulin 2.5 gm/dL    A/G Ratio 1.5 g/dL    BUN/Creatinine Ratio 19.4 7.0 - 25.0    Anion Gap 8.0 5.0 - 15.0 mmol/L    eGFR 86.2 >60.0 mL/min/1.73   Magnesium    Collection Time: 06/09/23 10:27 AM    Specimen: Blood   Result Value Ref Range    Magnesium 1.8 1.6 - 2.4 mg/dL   Single High Sensitivity Troponin T    Collection Time: 06/09/23 10:27 AM    Specimen: Blood   Result Value Ref Range    HS Troponin T 33 (H) <10 ng/L   Green Top (Gel)    Collection Time: 06/09/23 10:27 AM   Result Value Ref Range    Extra Tube Hold for add-ons.    Lavender Top    Collection Time: 06/09/23 10:27 AM   Result Value Ref Range    Extra Tube hold for add-on    Gold Top - SST    Collection Time: 06/09/23 10:27 AM   Result Value Ref Range    Extra Tube Hold for add-ons.    Light Blue Top    Collection Time: 06/09/23 10:27 AM   Result Value Ref Range    Extra Tube Hold for add-ons.    CBC Auto Differential    Collection Time: 06/09/23 10:27 AM    Specimen: Blood   Result Value Ref Range    WBC 7.65 3.40 - 10.80 10*3/mm3    RBC 4.84 3.77 - 5.28 10*6/mm3    Hemoglobin 15.1 12.0 - 15.9 g/dL    Hematocrit 45.4 34.0 - 46.6 %    MCV 93.8 79.0 - 97.0 fL    MCH 31.2 26.6 - 33.0 pg    MCHC 33.3 31.5 - 35.7 g/dL    RDW 12.8 12.3 - 15.4 %    RDW-SD 44.0 37.0 - 54.0 fl    MPV 9.5 6.0 - 12.0 fL    Platelets 198 140 - 450 10*3/mm3    Neutrophil % 79.5 (H) 42.7 - 76.0 %    Lymphocyte % 14.0 (L) 19.6 - 45.3 %    Monocyte % 4.4 (L) 5.0 - 12.0 %     Eosinophil % 0.9 0.3 - 6.2 %    Basophil % 0.7 0.0 - 1.5 %    Immature Grans % 0.5 0.0 - 0.5 %    Neutrophils, Absolute 6.08 1.70 - 7.00 10*3/mm3    Lymphocytes, Absolute 1.07 0.70 - 3.10 10*3/mm3    Monocytes, Absolute 0.34 0.10 - 0.90 10*3/mm3    Eosinophils, Absolute 0.07 0.00 - 0.40 10*3/mm3    Basophils, Absolute 0.05 0.00 - 0.20 10*3/mm3    Immature Grans, Absolute 0.04 0.00 - 0.05 10*3/mm3    nRBC 0.0 0.0 - 0.2 /100 WBC       Ordered the above labs and independently reviewed the results.        RADIOLOGY  CT Head Without Contrast    Result Date: 6/9/2023  CT HEAD WITHOUT CONTRAST  CLINICAL HISTORY: Fall. Syncope. Left forehead contusion.  TECHNIQUE: CT scan of the head was obtained with 3 mm axial soft tissue and 2 mm axial bone algorithm algorithm images. No intravenous contrast was administered. Sagittal and coronal reconstructions were obtained.  COMPARISON: No previous similar studies are available for comparison.  FINDINGS:   There is a prominent size left anterior frontal scalp hematoma. However, there is no evidence for a calvarial fracture. There is no evidence for an acute extra-axial hemorrhage.  The ventricles, sulci, and cisterns are age-appropriate. There are mild changes of chronic small vessel ischemic phenomena. The gray-white matter differentiation is within normal limits. The basal ganglia and thalami are unremarkable in appearance. Atherosclerotic changes are noted within the intracranial vasculature. The posterior fossa structures are within normal limits.  Incidental note is made of partial opacification of the left mastoid air cells.       No evidence for acute traumatic intracranial pathology. Incidental note is made of a prominent size left frontal scalp hematoma.   Radiation dose reduction techniques were utilized, including automated exposure control and exposure modulation based on body size.       XR Chest 1 View    Result Date: 6/9/2023  XR CHEST 1 VW-  HISTORY: Female who is 77  years-old,  syncope  TECHNIQUE: Frontal view of the chest  COMPARISON: None available  FINDINGS: The heart size is borderline. Aorta is tortuous. Pulmonary vasculature is unremarkable. Indeterminate focal opacity at the right apical region, possibility of a pulmonary nodule/neoplasm not excluded, CT correlation advised. No pleural effusion, or pneumothorax. No acute osseous process.      Indeterminate focal opacity at the right apical region, possibility of a pulmonary nodule/neoplasm not excluded, CT correlation advised.  This report was finalized on 6/9/2023 12:40 PM by Dr. Jerel Olivares M.D.       I ordered the above noted radiological studies. Reviewed by me and discussed with radiologist.  See dictation for official radiology interpretation.      PROCEDURES    Procedures      MEDICATIONS GIVEN IN ER    Medications   sodium chloride 0.9 % flush 10 mL (10 mL Intravenous Given 6/9/23 1027)         ORDERS PLACED DURING THIS VISIT:  Orders Placed This Encounter   Procedures    CT Head Without Contrast    XR Chest 1 View    [place] Draw    Comprehensive Metabolic Panel    Magnesium    Single High Sensitivity Troponin T    CBC Auto Differential    NPO Diet NPO Type: Strict NPO    Undress & Gown    Continuous Pulse Oximetry    Vital Signs    Orthostatic Blood Pressure    Wound Care    IP General Consult (Use specialty-specific consult if known)    Oxygen Therapy- Nasal Cannula; Titrate 1-6 LPM Per SpO2; 90 - 95%    POC Glucose Once    ECG 12 Lead Syncope    SCANNED - TELEMETRY      Insert Peripheral IV    Initiate Observation Status    CBC & Differential    Green Top (Gel)    Lavender Top    Gold Top - SST    Light Blue Top         PROGRESS, DATA ANALYSIS, CONSULTS, AND MEDICAL DECISION MAKING    All labs have been independently interpreted by me.  All radiology studies have been reviewed by me and discussed with radiologist dictating the report.   EKG's independently viewed and interpreted by me.  Discussion  below represents my analysis of pertinent findings related to patient's condition, differential diagnosis, treatment plan and final disposition.    Differential diagnosis includes but is not limited to orthostasis, vasovagal syncope, intracranial hemorrhage, dehydration.    ED Course as of 06/09/23 1329   Fri Jun 09, 2023   1141 EKG          EKG time: 1005  Rhythm/Rate: Sinus arrhythmia, rate 64  P waves and HI: Normal P, normal HI  QRS, axis: Narrow QRS, normal axis  ST and T waves: No acute    Independently Interpreted by me  Not significantly changed compared to prior 9/9/2021   [TR]   1200 CT Head Without Contrast  My independent interpretation of the CT of the head is no acute hemorrhage [TR]   1203 I reviewed the work-up and findings with the patient and family at the bedside.  Answered all questions.  Her left forehead scalp wound appears to be more of an abrasion than the laceration.  I do not see anything that would benefit from sutures.  Plan to bandage it with bacitracin.  I have a call out to the patient's primary care physician to discuss plan going forward.  Given that her symptoms have been more frequent and more severe I think it would make sense to admit her to the hospital for cardiology evaluation, and further diagnostics.  The patient and family are agreeable.  I will discuss with Dr. Medellin [TR]   1221 Orthostatics are negative after IV fluids by EMS [TR]   1312 Discussing with Dr Medellin.  Agrees to admit. [TR]   1313 I updated the patient and family at the bedside.  Answered all questions.  They are agreeable to admission. [TR]      ED Course User Index  [TR] Deshaun Pearl MD       I interpreted the cardiac monitor rhythm and my independent interpretation is: normal sinus rhythm.         AS OF 13:29 EDT VITALS:    BP - 145/70  HR - 66  TEMP - 99.2 °F (37.3 °C)  O2 SATS - 100%        DIAGNOSIS  Final diagnoses:   Syncope, unspecified syncope type   Closed head injury, initial encounter   Abrasion  of face, initial encounter   Pulmonary nodule         DISPOSITION  ED Disposition       ED Disposition   Decision to Admit    Condition   --    Comment   Level of Care: Telemetry [5]   Diagnosis: Syncope, unspecified syncope type [[id number]]   Admitting Physician: BESS CANCHOLA [[id number]]   Attending Physician: BESS CANCHOLA [[id number]]                    Note Disclaimer: At [facility], we believe that sharing information builds trust and better relationships. You are receiving this note because you recently visited [facility]. It is possible you will see health information before a provider has talked with you about it. This kind of information can be easy to misunderstand. To help you fully understand what it means for your health, we urge you to discuss this note with your provider.         Deshaun Pearl MD  06/09/23 1313       Deshaun Pearl MD  06/09/23 [id number]

## 2023-06-09 NOTE — ED NOTES
Nursing report ED to floor  Kamilla Hawthorne  77 y.o.  female    HPI :   Chief Complaint   Patient presents with    Syncope       Admitting doctor:   Ishmael Medellin MD    Admitting diagnosis:   The primary encounter diagnosis was Syncope, unspecified syncope type. Diagnoses of Closed head injury, initial encounter, Abrasion of face, initial encounter, and Pulmonary nodule were also pertinent to this visit.    Code status:   Current Code Status       Date Active Code Status Order ID Comments User Context       Not on file            Allergies:   Penicillins    Isolation:   No active isolations    Intake and Output  No intake or output data in the 24 hours ending 06/09/23 1338    Weight:       06/09/23  1030   Weight: 68.5 kg (151 lb)       Most recent vitals:   Vitals:    06/09/23 1230 06/09/23 1231 06/09/23 1232 06/09/23 1331   BP:  145/70  129/79   Patient Position:       Pulse: 67 65 66 70   Resp:    16   Temp:       SpO2: 100%  100% 99%   Weight:       Height:           Active LDAs/IV Access:   Lines, Drains & Airways       Active LDAs       Name Placement date Placement time Site Days    Peripheral IV Anterior;Left Forearm --  --  Forearm  --                    Labs (abnormal labs have a star):   Labs Reviewed   COMPREHENSIVE METABOLIC PANEL - Abnormal; Notable for the following components:       Result Value    Glucose 114 (*)     Chloride 111 (*)     Calcium 8.3 (*)     Total Bilirubin 1.4 (*)     All other components within normal limits    Narrative:     GFR Normal >60  Chronic Kidney Disease <60  Kidney Failure <15    The GFR formula is only valid for adults with stable renal function between ages 18 and 70.   SINGLE HSTROPONIN T - Abnormal; Notable for the following components:    HS Troponin T 33 (*)     All other components within normal limits    Narrative:     High Sensitive Troponin T Reference Range:  <10.0 ng/L- Negative Female for AMI  <15.0 ng/L- Negative Male for AMI  >=10 - Abnormal Female  indicating possible myocardial injury.  >=15 - Abnormal Male indicating possible myocardial injury.   Clinicians would have to utilize clinical acumen, EKG, Troponin, and serial changes to determine if it is an Acute Myocardial Infarction or myocardial injury due to an underlying chronic condition.        CBC WITH AUTO DIFFERENTIAL - Abnormal; Notable for the following components:    Neutrophil % 79.5 (*)     Lymphocyte % 14.0 (*)     Monocyte % 4.4 (*)     All other components within normal limits   MAGNESIUM - Normal   RAINBOW DRAW    Narrative:     The following orders were created for panel order [place] Draw.  Procedure                               Abnormality         Status                     ---------                               -----------         ------                     Green Top (Gel)[[id number]]                                  Final result               Lavender Top[[id number]]                                     Final result               Gold Top - SST[[id number]]                                   Final result               Light Blue Top[[id number]]                                   Final result                 Please view results for these tests on the individual orders.   POCT GLUCOSE FINGERSTICK   CBC AND DIFFERENTIAL    Narrative:     The following orders were created for panel order CBC & Differential.  Procedure                               Abnormality         Status                     ---------                               -----------         ------                     CBC Auto Differential[[id number]]        Abnormal            Final result                 Please view results for these tests on the individual orders.   GREEN TOP   LAVENDER TOP   GOLD TOP - SST   LIGHT BLUE TOP       EKG:   ECG 12 Lead Syncope   Final Result   HEART RATE= 64  bpm   RR Interval= 938  ms   AR Interval= 189  ms   P Horizontal Axis= -19  deg   P Front Axis= 64  deg   QRSD Interval= 93  ms   QT Interval= 426   ms   QRS Axis= -18  deg   T Wave Axis= 32  deg   - ABNORMAL ECG -   Sinus arrhythmia   Probable left atrial enlargement   Borderline left axis deviation   Anteroseptal infarct, age indeterminate   No Prior Tracing for Comparison   Electronically Signed By: Keo Torres ([facility]) 09-Jun-2023 11:14:29   Date and Time of Study: 2023-06-09 10:05:56      SCANNED - TELEMETRY     Final Result          Meds given in ED:   Medications   sodium chloride 0.9 % flush 10 mL (10 mL Intravenous Given 6/9/23 1027)       Imaging results:  CT Head Without Contrast    Result Date: 6/9/2023   No evidence for acute traumatic intracranial pathology. Incidental note is made of a prominent size left frontal scalp hematoma.   Radiation dose reduction techniques were utilized, including automated exposure control and exposure modulation based on body size.       XR Chest 1 View    Result Date: 6/9/2023  Indeterminate focal opacity at the right apical region, possibility of a pulmonary nodule/neoplasm not excluded, CT correlation advised.  This report was finalized on 6/9/2023 12:40 PM by Dr. Jerel Olivares M.D.       Ambulatory status:   - bedrest     Social issues:   Social History     Socioeconomic History    Marital status:    Tobacco Use    Smoking status: Former     Types: Cigarettes    Smokeless tobacco: Never   Vaping Use    Vaping Use: Never used   Substance and Sexual Activity    Alcohol use: Yes     Comment: SOCIAL    Drug use: Never    Sexual activity: Defer       NIH Stroke Scale:         Beryl Chery RN  06/09/23 13:38 EDT

## 2023-06-09 NOTE — H&P
Patient Care Team:  Ishmael Medellin MD as PCP - General (Internal Medicine)  Ishmael Lozano Jr., MD (Dermatology)  Suhail Rothman MD as Consulting Physician (Gastroenterology)    Chief complaint   Chief Complaint   Patient presents with   • Syncope        Subjective     Patient is a 77 y.o. female presents with a syncopal episode on the morning of admission around 9 AM.  She awoke this morning and just felt poorly all morning long.  Nausea.  Lightheadedness.  She started feeling clammy like she might pass out and went to the kitchen to try to take her blood pressure but missed the chair as she was painting and landed on the floor.   came to his sister.  Had her up in a sitting position.  She was completely unconscious for about 5 minutes.  Unfortunately she was kept in the sitting position all the time.  When EMS arrived the  still did not want to lay her down.  She recovered pretty well in route and in the emergency room.  She suffered a series of syncopal episodes over the past month or so.  All are very similar.  They all sound like orthostatic hypotension.  She was seen in the office at which time there was some concern for cardiac syncope but the patient had a cardiology consultation impending so further evaluation was deferred to cardiology.  She actually had an appointment with cardiology on the day of admission.  She has had no chest pain.  No palpitations.  Prior to the day there seem to be no prodrome or warning but today she certainly felt poorly all morning long.  She is on no antihypertensives.  General health has been good.  Distant history of melanoma.    Review of Systems    All other pertinent items are noted in HPI, all other systems reviewed and negative    History    Current Facility-Administered Medications:   •  atorvastatin (LIPITOR) tablet 10 mg, 10 mg, Oral, Nightly, Ishmael Medellin MD  •  sennosides-docusate (PERICOLACE) 8.6-50 MG per tablet 2 tablet, 2 tablet, Oral,  BID **AND** polyethylene glycol (MIRALAX) packet 17 g, 17 g, Oral, Daily PRN **AND** bisacodyl (DULCOLAX) EC tablet 5 mg, 5 mg, Oral, Daily PRN **AND** bisacodyl (DULCOLAX) suppository 10 mg, 10 mg, Rectal, Daily PRN, Ishmael Medellin MD  •  calcium carbonate (TUMS) chewable tablet 500 mg (200 mg elemental), 2 tablet, Oral, TID PRN, Ishmael Medellin MD  •  Enoxaparin Sodium (LOVENOX) syringe 40 mg, 40 mg, Subcutaneous, Daily, Ishmael Medellin MD, 40 mg at 06/09/23 1647  •  latanoprost (XALATAN) 0.005 % ophthalmic solution 1 drop, 1 drop, Both Eyes, Nightly, Ishmael Medellin MD  •  nitroglycerin (NITROSTAT) SL tablet 0.4 mg, 0.4 mg, Sublingual, Q5 Min PRN, Ishmael Medellin MD  •  sodium chloride 0.9 % flush 10 mL, 10 mL, Intravenous, PRN, Deshaun Pearl MD, 10 mL at 06/09/23 1027  •  sodium chloride 0.9 % flush 10 mL, 10 mL, Intravenous, Q12H, Ishmael Medellin MD  •  sodium chloride 0.9 % flush 10 mL, 10 mL, Intravenous, PRN, Ishmael Medellin MD  •  sodium chloride 0.9 % infusion 40 mL, 40 mL, Intravenous, PRN, Ishmael Medellin MD  •  sodium chloride 0.9 % infusion, 100 mL/hr, Intravenous, Continuous, Ishmael Medellin MD, Last Rate: 100 mL/hr at 06/09/23 1646, 100 mL/hr at 06/09/23 1646  Past Medical History:   Diagnosis Date   • Colon polyp    • Family history of colon cancer    • Glaucoma    • Hypertension    • Melanoma    • Mixed hyperlipidemia 2/26/2021     Past Surgical History:   Procedure Laterality Date   • COLONOSCOPY     • COLONOSCOPY N/A 2/4/2019    Procedure: COLONOSCOPY into cecum and TI with cold biopsy polypectomies;  Surgeon: Suhail Rothman MD;  Location: [facility] ENDOSCOPY;  Service: Gastroenterology   • COLONOSCOPY N/A 2/9/2021    Procedure: COLONOSCOPY TO CECUM/TI WITH COLD BX POLYPECTOMIES;  Surgeon: Suhail Rothman MD;  Location: [facility] ENDOSCOPY;  Service: Gastroenterology;  Laterality: N/A;  PERSONAL HX OF POLYPS, FAMILY HX OF COLON CANCER  --DIVERTICULOSIS, POLYPS, INTERNAL HEMORRHOIDS    • COLONOSCOPY W/  POLYPECTOMY     • LASIK Left      Family History   Problem Relation Age of Onset   • Emphysema Mother    • Stroke Father    • Malig Hyperthermia Neg Hx      Social History     Tobacco Use   • Smoking status: Former     Types: Cigarettes   • Smokeless tobacco: Never   Vaping Use   • Vaping Use: Never used   Substance Use Topics   • Alcohol use: Yes     Comment: SOCIAL   • Drug use: Never     Medications Prior to Admission   Medication Sig Dispense Refill Last Dose   • atorvastatin (LIPITOR) 10 MG tablet Take 1 tablet by mouth Daily. 90 tablet 1 6/8/2023   • cholecalciferol (VITAMIN D3) 1000 units tablet Take 1 tablet by mouth Daily.   6/8/2023   • dorzolamide-timolol (COSOPT) 22.3-6.8 MG/ML ophthalmic solution INSTILL 1 DROP BY OPHTHALMIC ROUTE 2 TIMES EVERY DAY INTO BOTH EYES  6 6/9/2023   • latanoprost (XALATAN) 0.005 % ophthalmic solution INSTILL 1 DROP BY OPHTHALMIC ROUTE EVERY DAY BOTH EYES AT BEDTIME  6 6/8/2023   • vitamin C (ASCORBIC ACID) 500 MG tablet Take 1 tablet by mouth Daily.   6/8/2023     Allergies:  Penicillins    Objective     Vital Signs  Temp:  [97.9 °F (36.6 °C)-99.2 °F (37.3 °C)] 97.9 °F (36.6 °C)  Heart Rate:  [65-86] 68  Resp:  [16-18] 17  BP: (129-159)/() 143/86    Physical Exam:      General Appearance:    Alert, cooperative, in no acute distress, head wrap around the forehead circumferentially over a hematoma   Head:    Normocephalic, without obvious abnormality, atraumatic   Eyes:            Lids and lashes normal, conjunctivae and sclerae normal, no   icterus, no pallor, corneas clear, PERRLA   Ears:      Throat:   No oral lesions, no thrush, oral mucosa moist   Neck:   No adenopathy, supple, trachea midline, no thyromegaly, no     carotid bruit, no JVD   Back:     No kyphosis present, no scoliosis present, no skin lesions,       erythema or scars, no tenderness to percussion or                   palpation,   range of motion normal   Lungs:     Clear to auscultation,respirations  regular, even and                   unlabored    Heart:    Regular rhythm and normal rate, normal S1 and S2, no            murmur, no gallop, no rub, no click   Breast Exam:    Deferred   Abdomen:     Normal bowel sounds, no masses, no organomegaly, soft        non-tender, non-distended, no guarding, no rebound                 tenderness   Genitalia:    Deferred   Extremities:   Moves all extremities well, no edema, no cyanosis, no              redness   Pulses:   Pulses palpable and equal bilaterally   Skin:   No bleeding, bruising or rash   Lymph nodes:   No palpable adenopathy   Neurologic:   Cranial nerves 2 - 12 grossly intac         Results Review:    I reviewed the patient's new clinical results.  I reviewed the patient's new imaging results and agree with the interpretation.  I personally viewed and interpreted the patient's EKG/Telemetry data  Discussed with The emergency room physician    Assessment & Plan       Syncope, unspecified syncope type  Probable orthostatic hypotension and likely underlying autonomic dysfunction.  No evidence of parkinsonism  Rule out heart block or bradycardia as a cause of symptoms  Low back pain, rule out compression fracture from one of her recent falls      The history certainly sounds like orthostatic hypotension to me today at the bedside.  Likely autonomic dysfunction in the absence of antihypertensives.  She will be admitted for cardiac monitoring.  We will check a TTE.  Cardiology consultation.  Check blood pressure multiples of positions through the hospital stay.  Further recommendations to follow.    I discussed the patients findings and my recommendations with patient and family.     Ishmael Medellin MD  06/09/23  17:44 EDT    Dragon disclaimer:   Part of this note may be an electronic transcription/translation of spoken language to printed text using the Dragon Dictation System.

## 2023-06-09 NOTE — ED TRIAGE NOTES
Pt to ER from home via [facility], pt had syncopal episode at home witnessed by , pt was still unconscious upon EMS arrival, EMS reports pt hypotensive systolic 80's, pt received 1L saline PTA. Pt did hit head, no blood thinners, +LOC. Pt arrives aaox4, abc's intact, SWAN, NAD noted at this time.

## 2023-06-10 ENCOUNTER — APPOINTMENT (OUTPATIENT)
Dept: MRI IMAGING | Facility: HOSPITAL | Age: 78
DRG: 264 | End: 2023-06-10
Payer: MEDICARE

## 2023-06-10 ENCOUNTER — APPOINTMENT (OUTPATIENT)
Dept: NEUROLOGY | Facility: HOSPITAL | Age: 78
DRG: 264 | End: 2023-06-10
Payer: MEDICARE

## 2023-06-10 ENCOUNTER — APPOINTMENT (OUTPATIENT)
Dept: ULTRASOUND IMAGING | Facility: HOSPITAL | Age: 78
DRG: 264 | End: 2023-06-10
Payer: MEDICARE

## 2023-06-10 ENCOUNTER — APPOINTMENT (OUTPATIENT)
Dept: CARDIOLOGY | Facility: HOSPITAL | Age: 78
DRG: 264 | End: 2023-06-10
Payer: MEDICARE

## 2023-06-10 LAB
AORTIC DIMENSIONLESS INDEX: 0.9 (DI)
BH CV ECHO MEAS - ACS: 2.07 CM
BH CV ECHO MEAS - AO MAX PG: 7.5 MMHG
BH CV ECHO MEAS - AO MEAN PG: 4.4 MMHG
BH CV ECHO MEAS - AO ROOT DIAM: 2.42 CM
BH CV ECHO MEAS - AO V2 MAX: 137.1 CM/SEC
BH CV ECHO MEAS - AO V2 VTI: 31.1 CM
BH CV ECHO MEAS - AVA(I,D): 2.6 CM2
BH CV ECHO MEAS - EDV(CUBED): 79.5 ML
BH CV ECHO MEAS - EDV(MOD-SP2): 28 ML
BH CV ECHO MEAS - EDV(MOD-SP4): 35 ML
BH CV ECHO MEAS - EF(MOD-BP): 57.5 %
BH CV ECHO MEAS - EF(MOD-SP2): 57.1 %
BH CV ECHO MEAS - EF(MOD-SP4): 57.1 %
BH CV ECHO MEAS - ESV(CUBED): 23.8 ML
BH CV ECHO MEAS - ESV(MOD-SP2): 12 ML
BH CV ECHO MEAS - ESV(MOD-SP4): 15 ML
BH CV ECHO MEAS - FS: 33.1 %
BH CV ECHO MEAS - IVS/LVPW: 1.13 CM
BH CV ECHO MEAS - IVSD: 0.9 CM
BH CV ECHO MEAS - LA DIMENSION: 2.8 CM
BH CV ECHO MEAS - LAT PEAK E' VEL: 11.3 CM/SEC
BH CV ECHO MEAS - LV DIASTOLIC VOL/BSA (35-75): 19.6 CM2
BH CV ECHO MEAS - LV MASS(C)D: 114.2 GRAMS
BH CV ECHO MEAS - LV MAX PG: 6 MMHG
BH CV ECHO MEAS - LV MEAN PG: 3.3 MMHG
BH CV ECHO MEAS - LV SYSTOLIC VOL/BSA (12-30): 8.4 CM2
BH CV ECHO MEAS - LV V1 MAX: 122.7 CM/SEC
BH CV ECHO MEAS - LV V1 VTI: 28.2 CM
BH CV ECHO MEAS - LVIDD: 4.3 CM
BH CV ECHO MEAS - LVIDS: 2.9 CM
BH CV ECHO MEAS - LVOT AREA: 2.9 CM2
BH CV ECHO MEAS - LVOT DIAM: 1.92 CM
BH CV ECHO MEAS - LVPWD: 0.8 CM
BH CV ECHO MEAS - MED PEAK E' VEL: 7.9 CM/SEC
BH CV ECHO MEAS - MV A MAX VEL: 127.7 CM/SEC
BH CV ECHO MEAS - MV DEC SLOPE: 609.9 CM/SEC2
BH CV ECHO MEAS - MV DEC TIME: 177 MSEC
BH CV ECHO MEAS - MV E MAX VEL: 121 CM/SEC
BH CV ECHO MEAS - MV E/A: 0.95
BH CV ECHO MEAS - MV MAX PG: 8 MMHG
BH CV ECHO MEAS - MV MEAN PG: 2.8 MMHG
BH CV ECHO MEAS - MV P1/2T: 63.9 MSEC
BH CV ECHO MEAS - MV V2 VTI: 36.2 CM
BH CV ECHO MEAS - MVA(P1/2T): 3.4 CM2
BH CV ECHO MEAS - MVA(VTI): 2.26 CM2
BH CV ECHO MEAS - SI(MOD-SP2): 9 ML/M2
BH CV ECHO MEAS - SI(MOD-SP4): 11.2 ML/M2
BH CV ECHO MEAS - SV(LVOT): 81.9 ML
BH CV ECHO MEAS - SV(MOD-SP2): 16 ML
BH CV ECHO MEAS - SV(MOD-SP4): 20 ML
BH CV ECHO MEAS - TAPSE (>1.6): 1.87 CM
BH CV ECHO MEASUREMENTS AVERAGE E/E' RATIO: 12.6
BH CV LOWER VASCULAR LEFT COMMON FEMORAL AUGMENT: NORMAL
BH CV LOWER VASCULAR LEFT COMMON FEMORAL COMPETENT: NORMAL
BH CV LOWER VASCULAR LEFT COMMON FEMORAL COMPRESS: NORMAL
BH CV LOWER VASCULAR LEFT COMMON FEMORAL PHASIC: NORMAL
BH CV LOWER VASCULAR LEFT COMMON FEMORAL SPONT: NORMAL
BH CV LOWER VASCULAR LEFT DISTAL FEMORAL COMPRESS: NORMAL
BH CV LOWER VASCULAR LEFT GASTRONEMIUS COMPRESS: NORMAL
BH CV LOWER VASCULAR LEFT GREATER SAPH AK COMPRESS: NORMAL
BH CV LOWER VASCULAR LEFT GREATER SAPH BK COMPRESS: NORMAL
BH CV LOWER VASCULAR LEFT LESSER SAPH COMPRESS: NORMAL
BH CV LOWER VASCULAR LEFT MID FEMORAL AUGMENT: NORMAL
BH CV LOWER VASCULAR LEFT MID FEMORAL COMPETENT: NORMAL
BH CV LOWER VASCULAR LEFT MID FEMORAL COMPRESS: NORMAL
BH CV LOWER VASCULAR LEFT MID FEMORAL PHASIC: NORMAL
BH CV LOWER VASCULAR LEFT MID FEMORAL SPONT: NORMAL
BH CV LOWER VASCULAR LEFT PERONEAL COMPRESS: NORMAL
BH CV LOWER VASCULAR LEFT POPLITEAL AUGMENT: NORMAL
BH CV LOWER VASCULAR LEFT POPLITEAL COMPETENT: NORMAL
BH CV LOWER VASCULAR LEFT POPLITEAL COMPRESS: NORMAL
BH CV LOWER VASCULAR LEFT POPLITEAL PHASIC: NORMAL
BH CV LOWER VASCULAR LEFT POPLITEAL SPONT: NORMAL
BH CV LOWER VASCULAR LEFT POSTERIOR TIBIAL COMPRESS: NORMAL
BH CV LOWER VASCULAR LEFT PROFUNDA FEMORAL COMPRESS: NORMAL
BH CV LOWER VASCULAR LEFT PROXIMAL FEMORAL COMPRESS: NORMAL
BH CV LOWER VASCULAR LEFT SAPHENOFEMORAL JUNCTION COMPRESS: NORMAL
BH CV LOWER VASCULAR RIGHT COMMON FEMORAL AUGMENT: NORMAL
BH CV LOWER VASCULAR RIGHT COMMON FEMORAL COMPETENT: NORMAL
BH CV LOWER VASCULAR RIGHT COMMON FEMORAL COMPRESS: NORMAL
BH CV LOWER VASCULAR RIGHT COMMON FEMORAL PHASIC: NORMAL
BH CV LOWER VASCULAR RIGHT COMMON FEMORAL SPONT: NORMAL
BH CV LOWER VASCULAR RIGHT DISTAL FEMORAL COMPRESS: NORMAL
BH CV LOWER VASCULAR RIGHT GASTRONEMIUS COMPRESS: NORMAL
BH CV LOWER VASCULAR RIGHT GREATER SAPH AK COMPRESS: NORMAL
BH CV LOWER VASCULAR RIGHT GREATER SAPH BK COMPRESS: NORMAL
BH CV LOWER VASCULAR RIGHT LESSER SAPH COMPRESS: NORMAL
BH CV LOWER VASCULAR RIGHT MID FEMORAL AUGMENT: NORMAL
BH CV LOWER VASCULAR RIGHT MID FEMORAL COMPETENT: NORMAL
BH CV LOWER VASCULAR RIGHT MID FEMORAL COMPRESS: NORMAL
BH CV LOWER VASCULAR RIGHT MID FEMORAL PHASIC: NORMAL
BH CV LOWER VASCULAR RIGHT MID FEMORAL SPONT: NORMAL
BH CV LOWER VASCULAR RIGHT PERONEAL COMPRESS: NORMAL
BH CV LOWER VASCULAR RIGHT POPLITEAL AUGMENT: NORMAL
BH CV LOWER VASCULAR RIGHT POPLITEAL COMPETENT: NORMAL
BH CV LOWER VASCULAR RIGHT POPLITEAL COMPRESS: NORMAL
BH CV LOWER VASCULAR RIGHT POPLITEAL PHASIC: NORMAL
BH CV LOWER VASCULAR RIGHT POPLITEAL SPONT: NORMAL
BH CV LOWER VASCULAR RIGHT POSTERIOR TIBIAL COMPRESS: NORMAL
BH CV LOWER VASCULAR RIGHT PROFUNDA FEMORAL COMPRESS: NORMAL
BH CV LOWER VASCULAR RIGHT PROXIMAL FEMORAL COMPRESS: NORMAL
BH CV LOWER VASCULAR RIGHT SAPHENOFEMORAL JUNCTION COMPRESS: NORMAL
BH CV XLRA - RV BASE: 2.49 CM
BH CV XLRA - RV LENGTH: 6.3 CM
BH CV XLRA - RV MID: 2.6 CM
BH CV XLRA - TDI S': 10.6 CM/SEC
HBA1C MFR BLD: 5.1 % (ref 4.8–5.6)
LEFT ATRIUM VOLUME INDEX: 20.4 ML/M2
QT INTERVAL: 414 MS
SINUS: 2.33 CM
STJ: 2.5 CM

## 2023-06-10 PROCEDURE — 25010000002 ENOXAPARIN PER 10 MG: Performed by: INTERNAL MEDICINE

## 2023-06-10 PROCEDURE — G0378 HOSPITAL OBSERVATION PER HR: HCPCS

## 2023-06-10 PROCEDURE — 97161 PT EVAL LOW COMPLEX 20 MIN: CPT

## 2023-06-10 PROCEDURE — 0 GADOBENATE DIMEGLUMINE 529 MG/ML SOLUTION: Performed by: INTERNAL MEDICINE

## 2023-06-10 PROCEDURE — 83036 HEMOGLOBIN GLYCOSYLATED A1C: CPT | Performed by: INTERNAL MEDICINE

## 2023-06-10 PROCEDURE — 76775 US EXAM ABDO BACK WALL LIM: CPT

## 2023-06-10 PROCEDURE — 95816 EEG AWAKE AND DROWSY: CPT

## 2023-06-10 PROCEDURE — A9577 INJ MULTIHANCE: HCPCS | Performed by: INTERNAL MEDICINE

## 2023-06-10 PROCEDURE — 93970 EXTREMITY STUDY: CPT

## 2023-06-10 PROCEDURE — 95816 EEG AWAKE AND DROWSY: CPT | Performed by: STUDENT IN AN ORGANIZED HEALTH CARE EDUCATION/TRAINING PROGRAM

## 2023-06-10 PROCEDURE — 93306 TTE W/DOPPLER COMPLETE: CPT

## 2023-06-10 PROCEDURE — 97110 THERAPEUTIC EXERCISES: CPT

## 2023-06-10 PROCEDURE — 70553 MRI BRAIN STEM W/O & W/DYE: CPT

## 2023-06-10 RX ORDER — LIDOCAINE 50 MG/G
1 PATCH TOPICAL
Status: DISCONTINUED | OUTPATIENT
Start: 2023-06-10 | End: 2023-06-15 | Stop reason: HOSPADM

## 2023-06-10 RX ORDER — ACETAMINOPHEN 325 MG/1
650 TABLET ORAL EVERY 6 HOURS PRN
Status: DISCONTINUED | OUTPATIENT
Start: 2023-06-10 | End: 2023-06-15 | Stop reason: HOSPADM

## 2023-06-10 RX ORDER — AMLODIPINE BESYLATE 5 MG/1
5 TABLET ORAL
Status: DISCONTINUED | OUTPATIENT
Start: 2023-06-10 | End: 2023-06-15 | Stop reason: HOSPADM

## 2023-06-10 RX ADMIN — AMLODIPINE BESYLATE 5 MG: 5 TABLET ORAL at 11:54

## 2023-06-10 RX ADMIN — ENOXAPARIN SODIUM 40 MG: 100 INJECTION SUBCUTANEOUS at 08:57

## 2023-06-10 RX ADMIN — GADOBENATE DIMEGLUMINE 15 ML: 529 INJECTION, SOLUTION INTRAVENOUS at 22:00

## 2023-06-10 RX ADMIN — ACETAMINOPHEN 650 MG: 325 TABLET, FILM COATED ORAL at 11:57

## 2023-06-10 RX ADMIN — ATORVASTATIN CALCIUM 10 MG: 20 TABLET, FILM COATED ORAL at 20:09

## 2023-06-10 RX ADMIN — Medication 10 ML: at 20:59

## 2023-06-10 RX ADMIN — SODIUM CHLORIDE 100 ML/HR: 9 INJECTION, SOLUTION INTRAVENOUS at 02:30

## 2023-06-10 RX ADMIN — LIDOCAINE 1 PATCH: 50 PATCH CUTANEOUS at 14:01

## 2023-06-10 RX ADMIN — Medication 10 ML: at 08:57

## 2023-06-10 NOTE — SIGNIFICANT NOTE
06/10/23 1500   OTHER   Discipline occupational therapist   Rehab Time/Intention   Session Not Performed other (see comments)  (talked to pt and pts linda Nassar who is SLP here at [facility] and pt is independent, no OT needs. pt asked about a shower. Cesario states [employer] can A her with shower. OT signing off.)

## 2023-06-10 NOTE — CONSULTS
Date of Hospital Visit:   Encounter Provider: Estuardo Lazaro MD  Place of Service: [facility] CARDIOLOGY  Patient Name: Kamilla Hawthorne  :1945  Referral Provider: Ishmael Medellin MD    Chief complaint syncopal episode    History of Present Illness Kamilla Hawthorne is a 77 year old pt with a history of HTN and HLD.  Patient has been experiencing near syncope over the past 2 years but the frequency and intensity of symptoms have escalated over the past 2 months and she was referred to outpatient cardiology clinic.  She had an appointment on the day of admission but unfortunately had an episode of syncope.     Pt presented to ER on 23 with complaints of acute episode of being clammy, lightheaded, fall and passing out. She reported she was standing and suddenly felt clammy and dizzy.  She denied nausea or vomiting.  She walked to the kitchen to check her blood pressure and she had to lean against a piece of furniture for support that moved and she fell and hit her head. She denied LOC or a syncopal episode at that time. Her  came in, lifted her to a standing or seated position and then she passed out. He called EMS. She ws unconscious for about 5 minutes. Per EMS her BP was in 80s and she was given a bolus. Pt denied any pain.  She denied any focal weakness or numbness.  No loss of bowel or bladder continence.  No chest pain or palpitations.  In ER, troponin T 33, CT of head showed nothing acute, CXR negative for acute,  EKG showed sinus arrhyhtmia 64, orthostatics were negative after IVFs.     HPI was reviewed, updated and amended when necessary.      Past Medical History:   Diagnosis Date    Colon polyp     Family history of colon cancer     Glaucoma     Hypertension     Melanoma     Mixed hyperlipidemia 2021       Past Surgical History:   Procedure Laterality Date    COLONOSCOPY      COLONOSCOPY N/A 2019    Procedure: COLONOSCOPY into cecum and TI with cold biopsy  polypectomies;  Surgeon: Suhail Rothman MD;  Location: [facility] ENDOSCOPY;  Service: Gastroenterology    COLONOSCOPY N/A 2/9/2021    Procedure: COLONOSCOPY TO CECUM/TI WITH COLD BX POLYPECTOMIES;  Surgeon: Suhail Rothman MD;  Location: [facility] ENDOSCOPY;  Service: Gastroenterology;  Laterality: N/A;  PERSONAL HX OF POLYPS, FAMILY HX OF COLON CANCER  --DIVERTICULOSIS, POLYPS, INTERNAL HEMORRHOIDS     COLONOSCOPY W/ POLYPECTOMY      LASIK Left        Medications Prior to Admission   Medication Sig Dispense Refill Last Dose    atorvastatin (LIPITOR) 10 MG tablet Take 1 tablet by mouth Daily. 90 tablet 1 6/8/2023    cholecalciferol (VITAMIN D3) 1000 units tablet Take 1 tablet by mouth Daily.   6/8/2023    dorzolamide-timolol (COSOPT) 22.3-6.8 MG/ML ophthalmic solution INSTILL 1 DROP BY OPHTHALMIC ROUTE 2 TIMES EVERY DAY INTO BOTH EYES  6 6/9/2023    latanoprost (XALATAN) 0.005 % ophthalmic solution INSTILL 1 DROP BY OPHTHALMIC ROUTE EVERY DAY BOTH EYES AT BEDTIME  6 6/8/2023    vitamin C (ASCORBIC ACID) 500 MG tablet Take 1 tablet by mouth Daily.   6/8/2023       Current Meds  Scheduled Meds:atorvastatin, 10 mg, Oral, Nightly  enoxaparin, 40 mg, Subcutaneous, Daily  latanoprost, 1 drop, Both Eyes, Nightly  senna-docusate sodium, 2 tablet, Oral, BID  sodium chloride, 10 mL, Intravenous, Q12H      Continuous Infusions:sodium chloride, 100 mL/hr, Last Rate: 100 mL/hr (06/10/23 0505)      PRN Meds:.  senna-docusate sodium **AND** polyethylene glycol **AND** bisacodyl **AND** bisacodyl    calcium carbonate    nitroglycerin    sodium chloride    sodium chloride    sodium chloride    Allergies as of 06/09/2023 - Reviewed 06/09/2023   Allergen Reaction Noted    Penicillins Itching 08/08/2017       Social History     Socioeconomic History    Marital status:    Tobacco Use    Smoking status: Former     Types: Cigarettes    Smokeless tobacco: Never   Vaping Use    Vaping Use: Never used   Substance and Sexual Activity    Alcohol  "use: Yes     Comment: SOCIAL    Drug use: Never    Sexual activity: Defer       Family History   Problem Relation Age of Onset    Emphysema Mother     Stroke Father     Malig Hyperthermia Neg Hx        Review of Systems   Constitutional: Negative for chills and fever.   HENT:  Negative for hoarse voice and sore throat.    Eyes:  Negative for double vision and photophobia.   Cardiovascular:  Negative for chest pain, leg swelling, near-syncope, orthopnea, palpitations, paroxysmal nocturnal dyspnea and syncope.   Respiratory:  Negative for cough and wheezing.    Skin:  Negative for poor wound healing and rash.   Musculoskeletal:  Negative for arthritis and joint swelling.   Gastrointestinal:  Negative for bloating, abdominal pain, hematemesis and hematochezia.   Neurological:  Positive for dizziness. Negative for focal weakness.   Psychiatric/Behavioral:  Negative for depression and suicidal ideas.           Objective:   Temp:  [97.7 °F (36.5 °C)-99.2 °F (37.3 °C)] 97.7 °F (36.5 °C)  Heart Rate:  [65-86] 76  Resp:  [16-18] 16  BP: (129-177)/() 177/90  Body mass index is 24.74 kg/m².  Flowsheet Rows      Flowsheet Row First Filed Value   Admission Height 167.6 cm (66\") Documented at 06/09/2023 1030   Admission Weight 68.5 kg (151 lb) Documented at 06/09/2023 1030          Vitals:    06/10/23 0812   BP: 177/90   Pulse: 76   Resp: 16   Temp: 97.7 °F (36.5 °C)   SpO2: 97%       Vitals reviewed.   Constitutional:       Appearance: Healthy appearance. Not in distress.   Neck:      Vascular: No JVR. JVD normal.   Pulmonary:      Effort: Pulmonary effort is normal.      Breath sounds: Normal breath sounds. No wheezing. No rhonchi. No rales.   Chest:      Chest wall: Not tender to palpatation.   Cardiovascular:      PMI at left midclavicular line. Normal rate. Regular rhythm. Normal S1. Normal S2.       Murmurs: There is no murmur.      No gallop.  No click. No rub.   Pulses:     Intact distal pulses.   Edema:     " Peripheral edema absent.   Abdominal:      General: Bowel sounds are normal.      Palpations: Abdomen is soft.      Tenderness: There is no abdominal tenderness.   Musculoskeletal: Normal range of motion.         General: No tenderness. Skin:     General: Skin is warm and dry.   Neurological:      General: No focal deficit present.      Mental Status: Alert and oriented to person, place and time.               Lab Review:      Results from last 7 days   Lab Units 06/09/23  1027   SODIUM mmol/L 144   POTASSIUM mmol/L 4.1   CHLORIDE mmol/L 111*   CO2 mmol/L 25.0   BUN mg/dL 14   CREATININE mg/dL 0.72   CALCIUM mg/dL 8.3*   BILIRUBIN mg/dL 1.4*   ALK PHOS U/L 81   ALT (SGPT) U/L 18   AST (SGOT) U/L 20   GLUCOSE mg/dL 114*     Results from last 7 days   Lab Units 06/09/23  1846 06/09/23  1640 06/09/23  1027   HSTROP T ng/L 41* 37* 33*     @LABRCNTbnp@  Results from last 7 days   Lab Units 06/09/23  1027   WBC 10*3/mm3 7.65   HEMOGLOBIN g/dL 15.1   HEMATOCRIT % 45.4   PLATELETS 10*3/mm3 198         Results from last 7 days   Lab Units 06/09/23  1027   MAGNESIUM mg/dL 1.8     @LABRCNTIP(chol,trig,hdl,ldl)                      I personally viewed and interpreted the patient's EKG/Telemetry data  )  Patient Active Problem List   Diagnosis    Essential hypertension    History of melanoma    History of adenomatous polyp of colon    Family history of colon cancer    Mixed hyperlipidemia    Syncope, unspecified syncope type     Assessment and Plan:    Syncope -etiology uncertain.  She certainly has some difficulty with cognition but her daughter is at bedside and tells me that she feels it is age-appropriate.  During our conversation she had multiple times where she had difficulty remembering details.  It is all post so possible that she has mild postconcussion syndrome after hitting her head.  No acute intracranial pathology.  Prominent left frontal scalp hematoma is noted.  EEG was performed.  MRI is pending.  She does not  clearly associate with postural change.  Her orthostatics were normal this morning but she has been receiving fluids.  The overall story seems more consistent with dysautonomia than orthostatic hypotension.  No arrhythmia on telemetry thus far.  Echocardiogram ordered.  She will need a Zio patch at discharge.  Hypertension -difficult scenario because she is having frequent episodes of near syncope.  Amlodipine was started.  We will monitor closely.  Cognitive dysfunction  Elevated D-dimer - low suspicion for PE.  CTA chest and venous Doppler was ordered.    Estuardo Lazaro MD  06/10/23  09:12 EDT.  Time spent in reviewing chart, discussion and examination:

## 2023-06-10 NOTE — PROGRESS NOTES
Name: Kamilla Hawthorne ADMIT: 2023   : 1945  PCP: Ishmael Medellin MD    MRN: [id number] LOS: 0 days   AGE/SEX: 77 y.o. female  ROOM: [facility]     Subjective   Subjective   Positive mild headache.  Continues with postural dizziness.  No seizures.  No loss of consciousness.  No focal neurological symptoms.  There is no chest pain.  No palpitation.  No ankle edema.  No cough.  Positive shortness of breath.    Review of Systems  .  No dysuria or hematuria or urinary incontinence.  GI.  No abdominal pain.  No nausea or vomiting.  No bowel movement since admission yesterday.  Constitutional.  No fever or chills.     Objective   Objective   Vital Signs  Temp:  [97.7 °F (36.5 °C)-98.1 °F (36.7 °C)] 97.7 °F (36.5 °C)  Heart Rate:  [65-86] 76  Resp:  [16-17] 16  BP: (129-177)/() 177/90  SpO2:  [96 %-100 %] 97 %  on   ;   Device (Oxygen Therapy): room air    Intake/Output Summary (Last 24 hours) at 6/10/2023 1039  Last data filed at 6/10/2023 0505  Gross per 24 hour   Intake 1000 ml   Output --   Net 1000 ml     Body mass index is 24.74 kg/m².      23  1030 06/10/23  0812   Weight: 68.5 kg (151 lb) 69.5 kg (153 lb 4.8 oz)     Physical Exam  General.  Elderly female.  Alert and oriented x3.  In no apparent pain/distress/diaphoresis.  Normal mood and affect.  HEENT.  Status post bilateral cataract surgery.  Pupils equal round and reactive.  Intact extraocular musculature.  No pallor or jaundice.  Oral cavity with moist mucous membrane and no lesions.  There is a small scalp hematoma on the left temporal area.  Neck.  Supple.  No JVD.  No lymphadenopathy or thyromegaly.  No carotid bruit.  No C-spine tenderness.  Cardiovascular.  Regular rate and rhythm with no gallops or murmurs.  Chest.  Clear to auscultation bilaterally with no added sounds.  Abdomen.  Soft lax.  No tenderness.  No organomegaly.  No guarding or rebound.  Extremities.  No clubbing/cyanosis/edema.  CNS.  No acute focal neurological  deficits.  Intact motor/sensory/cranial nerve/cerebellar systems.  Gait and Romberg not assessed.    Results Review:      Results from last 7 days   Lab Units 06/09/23  1027   SODIUM mmol/L 144   POTASSIUM mmol/L 4.1   CHLORIDE mmol/L 111*   CO2 mmol/L 25.0   BUN mg/dL 14   CREATININE mg/dL 0.72   GLUCOSE mg/dL 114*   CALCIUM mg/dL 8.3*   AST (SGOT) U/L 20   ALT (SGPT) U/L 18     Estimated Creatinine Clearance: 71.8 mL/min (by C-G formula based on SCr of 0.72 mg/dL).          Results from last 7 days   Lab Units 06/09/23  1846 06/09/23  1640 06/09/23  1027   HSTROP T ng/L 41* 37* 33*             Results from last 7 days   Lab Units 06/09/23  1027   MAGNESIUM mg/dL 1.8           Invalid input(s): LDLCALC  Results from last 7 days   Lab Units 06/09/23  1027   WBC 10*3/mm3 7.65   HEMOGLOBIN g/dL 15.1   HEMATOCRIT % 45.4   PLATELETS 10*3/mm3 198   MCV fL 93.8   MCH pg 31.2   MCHC g/dL 33.3   RDW % 12.8   RDW-SD fl 44.0   MPV fL 9.5   NEUTROPHIL % % 79.5*   LYMPHOCYTE % % 14.0*   MONOCYTES % % 4.4*   EOSINOPHIL % % 0.9   BASOPHIL % % 0.7   IMM GRAN % % 0.5   NEUTROS ABS 10*3/mm3 6.08   LYMPHS ABS 10*3/mm3 1.07   MONOS ABS 10*3/mm3 0.34   EOS ABS 10*3/mm3 0.07   BASOS ABS 10*3/mm3 0.05   IMMATURE GRANS (ABS) 10*3/mm3 0.04   NRBC /100 WBC 0.0                                           Imaging:  Imaging Results (Last 24 Hours)       Procedure Component Value Units Date/Time    US Aorta Limited [[id number]] Collected: 06/10/23 0804     Updated: 06/10/23 0812    Narrative:      ULTRASOUND OF THE ABDOMINAL AORTA     HISTORY: Possible abdominal aortic aneurysm on x-rays of the lumbar  spine.     FINDINGS: The abdominal aorta measures 2.2, 1.6, 1.3 cm proximal, mid,  and distal segments. Aortic vascular calcifications are present, though  there is no aneurysmal dilatation. The common iliac arteries measure 0.7  cm diameter.       Impression:      Atherosclerotic disease involving the abdominal aorta. The  proximal abdominal aorta  measures 2.2 cm in diameter which is mildly  enlarged though there is no aneurysmal dilatation.     This report was finalized on 6/10/2023 8:09 AM by Dr. Vignesh Arias M.D.       XR Spine Lumbar Complete 4+VW [[id number]] Collected: 06/09/23 2115     Updated: 06/09/23 2119    Narrative:      4 VIEWS LUMBAR SPINE     HISTORY: Fall with low back pain     COMPARISON: None available.     FINDINGS:  No acute fracture or subluxation the lumbar spine is identified. There  is multilevel discogenic degenerative disease. There is anterolisthesis  of L5 on S1. There are dense vascular calcifications.       Impression:      No acute fracture or subluxation identified.     This report was finalized on 6/9/2023 9:16 PM by Dr. Stacey Pepe M.D.       CT Head Without Contrast [[id number]] Collected: 06/09/23 1226     Updated: 06/09/23 1534    Narrative:      CT HEAD WITHOUT CONTRAST     CLINICAL HISTORY: Fall. Syncope. Left forehead contusion.     TECHNIQUE: CT scan of the head was obtained with 3 mm axial soft tissue  and 2 mm axial bone algorithm algorithm images. No intravenous contrast  was administered. Sagittal and coronal reconstructions were obtained.     COMPARISON: No previous similar studies are available for comparison.     FINDINGS:       There is a prominent size left anterior frontal scalp hematoma. However,  there is no evidence for a calvarial fracture. There is no evidence for  an acute extra-axial hemorrhage.     The ventricles, sulci, and cisterns are age-appropriate. There are mild  changes of chronic small vessel ischemic phenomena. The gray-white  matter differentiation is within normal limits. The basal ganglia and  thalami are unremarkable in appearance. Atherosclerotic changes are  noted within the intracranial vasculature. The posterior fossa  structures are within normal limits.     Incidental note is made of partial opacification of the left mastoid air  cells.       Impression:         No  evidence for acute traumatic intracranial pathology. Incidental note  is made of a prominent size left frontal scalp hematoma.        Radiation dose reduction techniques were utilized, including automated  exposure control and exposure modulation based on body size.     This report was finalized on 6/9/2023 3:31 PM by Dr. Adriano Martinez M.D.       XR Chest 1 View [[id number]] Collected: 06/09/23 1238     Updated: 06/09/23 1243    Narrative:      XR CHEST 1 VW-     HISTORY: Female who is 77 years-old,  syncope     TECHNIQUE: Frontal view of the chest     COMPARISON: None available     FINDINGS: The heart size is borderline. Aorta is tortuous. Pulmonary  vasculature is unremarkable. Indeterminate focal opacity at the right  apical region, possibility of a pulmonary nodule/neoplasm not excluded,  CT correlation advised. No pleural effusion, or pneumothorax. No acute  osseous process.       Impression:      Indeterminate focal opacity at the right apical region,  possibility of a pulmonary nodule/neoplasm not excluded, CT correlation  advised.     This report was finalized on 6/9/2023 12:40 PM by Dr. Jerel Olivares M.D.                  I reviewed the patient's new clinical results / labs / tests / procedures      Assessment/Plan     Active Hospital Problems    Diagnosis  POA    **Syncope, unspecified syncope type [R55]  Yes      Resolved Hospital Problems   No resolved problems to display.           Syncope.  Leading to head trauma.  Unclear etiology.  Orthostatics are negative.  EKG with normal sinus rhythm.  Negative CNS examination and cardiac examination.  Aortic abdominal ultrasound negative for AAA.  CT scan of the brain without acute disease.  Positive D-dimer and positive elevation of the troponin.  Will check 2D echo/carotid ultrasound/MRI of the brain.  There is no evidence of angina or congestive heart failure.  Cardiology has been consulted by Dr. Medellin  Low back pain.  X-ray with degenerative disc disease  and no fractures.  Aortic ultrasound negative for AAA.  The plan Tylenol and Lidoderm patch.  Physical therapy.  Hypertension.  No orthostasis.  Uncontrolled.  Will initiate Norvasc.  And monitor.  Hyperlipidemia.  Continue Lipitor.  Hyperglycemia.  Check A1c.  Hyperbilirubinemia.  Asymptomatic with benign GI examination.  Will monitor.  VTE prophylaxis.  On Lovenox prophylaxis.    Discussed my findings and plan of treatment with the patient/daughters.  Position.  To be determined based on clinical course.        Clarissa Shaw MD  [facility]  06/10/23  10:39 EDT

## 2023-06-10 NOTE — PLAN OF CARE
Goal Outcome Evaluation:  Plan of Care Reviewed With: patient        Progress: improving  Outcome Evaluation: Pt VSS. Pt is A&Ox4. EEG completed at bedside. L forehead hematoma/laceration is open to air. Ambulated in hallway with physical therapy this shift. Orthostatics taken x 3 this shift, each time without a drop in blood pressure. MRI brain ordered, screening completed, awaiting completion. IV fluids infusing. Patient denies blurry vision, numbness, tingling, and headache.

## 2023-06-10 NOTE — PLAN OF CARE
Goal Outcome Evaluation:  Plan of Care Reviewed With: patient, daughter           Outcome Evaluation: Pt adm after a syncopal episode, has a L forehead laceration/hematoma, work up ongoing for cause. Pt lives with her spouse and is independent without assistive device at baseline. Tested orthostatics and pt did not have a drop in BP, so went forward with walking, pt able to walk 180 ft with supervision, no symptoms of dizziness, appears close to baseline, PT will follow up on Monday to check for any further needs.

## 2023-06-10 NOTE — THERAPY EVALUATION
Patient Name: Kamilla Hawthorne  : 1945    MRN: [id number]                              Today's Date: 6/10/2023       Admit Date: 2023    Visit Dx:     ICD-10-CM ICD-9-CM   1. Syncope, unspecified syncope type  R55 780.2   2. Closed head injury, initial encounter  S09.90XA 959.01   3. Abrasion of face, initial encounter  S00.81XA 910.0   4. Pulmonary nodule  R91.1 793.11     Patient Active Problem List   Diagnosis    Essential hypertension    History of melanoma    History of adenomatous polyp of colon    Family history of colon cancer    Mixed hyperlipidemia    Syncope, unspecified syncope type     Past Medical History:   Diagnosis Date    Colon polyp     Family history of colon cancer     Glaucoma     Hypertension     Melanoma     Mixed hyperlipidemia 2021     Past Surgical History:   Procedure Laterality Date    COLONOSCOPY      COLONOSCOPY N/A 2019    Procedure: COLONOSCOPY into cecum and TI with cold biopsy polypectomies;  Surgeon: Suhail Rothman MD;  Location: [facility] ENDOSCOPY;  Service: Gastroenterology    COLONOSCOPY N/A 2021    Procedure: COLONOSCOPY TO CECUM/TI WITH COLD BX POLYPECTOMIES;  Surgeon: Suhail Rothman MD;  Location: [facility] ENDOSCOPY;  Service: Gastroenterology;  Laterality: N/A;  PERSONAL HX OF POLYPS, FAMILY HX OF COLON CANCER  --DIVERTICULOSIS, POLYPS, INTERNAL HEMORRHOIDS     COLONOSCOPY W/ POLYPECTOMY      LASIK Left       General Information       Row Name 06/10/23 1322          Physical Therapy Time and Intention    Document Type evaluation  -PC     Mode of Treatment physical therapy  -       Row Name 06/10/23 1322          General Information    Patient Profile Reviewed yes  -PC     Prior Level of Function independent:  -PC     Existing Precautions/Restrictions fall;orthostatic hypotension  -PC       Row Name 06/10/23 1322          Living Environment    People in Home spouse  -PC       Row Name 06/10/23 1322          Cognition    Orientation Status (Cognition)  oriented x 4  -PC               User Key  (r) = Recorded By, (t) = Taken By, (c) = Cosigned By      Initials Name Provider Type    PC Janel Morfin, PT Physical Therapist                   Mobility       Row Name 06/10/23 1322          Bed Mobility    Bed Mobility supine-sit;sit-supine  -PC     Supine-Sit [place] (Bed Mobility) independent  -PC     Sit-Supine [place] (Bed Mobility) independent  -PC       Row Name 06/10/23 1322          Sit-Stand Transfer    Sit-Stand [place] (Transfers) standby assist  -PC       Row Name 06/10/23 1322          Gait/Stairs (Locomotion)    [place] Level (Gait) supervision  -PC     Distance in Feet (Gait) 180 ft  -PC     Deviations/Abnormal Patterns (Gait) base of support, wide  -PC     Bilateral Gait Deviations decreased arm swing  -PC               User Key  (r) = Recorded By, (t) = Taken By, (c) = Cosigned By      Initials Name Provider Type    PC Janel Morfin, PT Physical Therapist                   Obj/Interventions       Row Name 06/10/23 1323          Balance    Balance Assessment sitting static balance;sitting dynamic balance;standing static balance;standing dynamic balance  -PC     Static Sitting Balance independent  -PC     Dynamic Sitting Balance independent  -PC     Static Standing Balance standby assist  -PC     Dynamic Standing Balance supervision  -PC               User Key  (r) = Recorded By, (t) = Taken By, (c) = Cosigned By      Initials Name Provider Type    PC Janel Morfin, PT Physical Therapist                   Goals/Plan       Row Name 06/10/23 1332          Gait Training Goal 1 (PT)    Activity/Assistive Device (Gait Training Goal 1, PT) gait (walking locomotion)  -PC     [place] Level (Gait Training Goal 1, PT) modified independence  -PC     Distance (Gait Training Goal 1, PT) 300 ft  -PC     Time Frame (Gait Training Goal 1, PT) 1 week  -PC       Row Name 06/10/23 1332          Therapy Assessment/Plan (PT)    Planned Therapy  Interventions (PT) gait training;strengthening  -PC               User Key  (r) = Recorded By, (t) = Taken By, (c) = Cosigned By      Initials Name Provider Type    PC Janel Morfin, PT Physical Therapist                   Clinical Impression       Row Name 06/10/23 1323          Pain    Pretreatment Pain Rating 0/10 - no pain  -PC       Row Name 06/10/23 1323          Plan of Care Review    Plan of Care Reviewed With patient;daughter  -PC     Outcome Evaluation Pt adm after a syncopal episode, has a L forehead laceration/hematoma, work up ongoing for cause. Pt lives with her spouse and is independent without assistive device at baseline. Tested orthostatics and pt did not have a drop in BP, so went forward with walking, pt able to walk 180 ft with supervision, no symptoms of dizziness, appears close to baseline, PT will follow up on Monday to check for any further needs.  -PC       Row Name 06/10/23 1323          Therapy Assessment/Plan (PT)    Rehab Potential (PT) good, to achieve stated therapy goals  -PC     Therapy Frequency (PT) 3 times/wk  -PC       Row Name 06/10/23 1323          Vital Signs    Pre Systolic BP Rehab 169  -PC     Pre Treatment Diastolic BP 86  sitting edge of bed upon entry  -PC     Intra Systolic BP Rehab 158  -PC     Intra Treatment Diastolic BP 83  standing one minute  -PC     Post Systolic BP Rehab 162  -PC     Post Treatment Diastolic BP 82  standing 3 minutes  -PC       Row Name 06/10/23 1323          Positioning and Restraints    Pre-Treatment Position in bed  -PC     Post Treatment Position bed  -PC     In Bed supine;call light within reach;encouraged to call for assist;exit alarm on;with family/caregiver  -PC               User Key  (r) = Recorded By, (t) = Taken By, (c) = Cosigned By      Initials Name Provider Type    Janel Frank, PT Physical Therapist                   Outcome Measures       Row Name 06/10/23 1332 06/10/23 0800       How much help from another person do  you currently need...    Turning from your back to your side while in flat bed without using bedrails? 4  -PC 3  -TL    Moving from lying on back to sitting on the side of a flat bed without bedrails? 4  -PC 3  -TL    Moving to and from a bed to a chair (including a wheelchair)? 4  -PC 3  -TL    Standing up from a chair using your arms (e.g., wheelchair, bedside chair)? 4  -PC 3  -TL    Climbing 3-5 steps with a railing? 3  -PC 2  -TL    To walk in hospital room? 3  -PC 2  -TL    AM-PAC 6 Clicks Score (PT) 22  -PC 16  -TL    Highest level of mobility 7 --> Walked 25 feet or more  -PC 5 --> Static standing  -TL              User Key  (r) = Recorded By, (t) = Taken By, (c) = Cosigned By      Initials Name Provider Type    PC Janel Morfin, PT Physical Therapist    TL Noe Franco RN Registered Nurse                                 Physical Therapy Education       Title: PT OT SLP Therapies (Done)       Topic: Physical Therapy (Done)       Point: Mobility training (Done)       Learning Progress Summary             Patient Acceptance, E,D, DU by  at 6/10/2023 1335                         Point: Home exercise program (Done)       Learning Progress Summary             Patient Acceptance, E,D, DU by  at 6/10/2023 1335                         Point: Body mechanics (Done)       Learning Progress Summary             Patient Acceptance, E,D, DU by  at 6/10/2023 1335                         Point: Precautions (Done)       Learning Progress Summary             Patient Acceptance, E,D, DU by  at 6/10/2023 1335                                         User Key       Initials Effective Dates Name Provider Type Discipline     06/16/21 -  Janel Morfin, PT Physical Therapist PT                  PT Recommendation and Plan  Planned Therapy Interventions (PT): gait training, strengthening  Plan of Care Reviewed With: patient, daughter  Outcome Evaluation: Pt adm after a syncopal episode, has a L forehead laceration/hematoma,  work up ongoing for cause. Pt lives with her spouse and is independent without assistive device at baseline. Tested orthostatics and pt did not have a drop in BP, so went forward with walking, pt able to walk 180 ft with supervision, no symptoms of dizziness, appears close to baseline, PT will follow up on Monday to check for any further needs.     Time Calculation:    PT Charges       Row Name 06/10/23 1336             Time Calculation    Start Time 1049  -PC      Stop Time 1105  -PC      Time Calculation (min) 16 min  -PC      PT Received On 06/10/23  -PC      PT - Next Appointment 06/12/23  -PC      PT Goal Re-Cert Due Date 06/17/23  -PC         Time Calculation- PT    Total Timed Code Minutes- PT 11 minute(s)  -PC                User Key  (r) = Recorded By, (t) = Taken By, (c) = Cosigned By      Initials Name Provider Type    PC Janel Morfin, PT Physical Therapist                  Therapy Charges for Today       Code Description Service Date Service Provider Modifiers Qty    36767290336 HC PT EVAL LOW COMPLEXITY 2 6/10/2023 Janel Morfin, PT GP 1    26480053771 HC PT THER PROC EA 15 MIN 6/10/2023 Janel Morfin, PT GP 1            PT G-Codes  AM-PAC 6 Clicks Score (PT): 22  PT Discharge Summary  Anticipated Discharge Disposition (PT): home    Janel Morfin PT  6/10/2023

## 2023-06-11 ENCOUNTER — APPOINTMENT (OUTPATIENT)
Dept: CT IMAGING | Facility: HOSPITAL | Age: 78
DRG: 264 | End: 2023-06-11
Payer: MEDICARE

## 2023-06-11 LAB
ALBUMIN SERPL-MCNC: 3.6 G/DL (ref 3.5–5.2)
ALBUMIN/GLOB SERPL: 1.6 G/DL
ALP SERPL-CCNC: 74 U/L (ref 39–117)
ALT SERPL W P-5'-P-CCNC: 17 U/L (ref 1–33)
ANION GAP SERPL CALCULATED.3IONS-SCNC: 7.9 MMOL/L (ref 5–15)
AST SERPL-CCNC: 18 U/L (ref 1–32)
BASOPHILS # BLD AUTO: 0.03 10*3/MM3 (ref 0–0.2)
BASOPHILS NFR BLD AUTO: 0.6 % (ref 0–1.5)
BILIRUB SERPL-MCNC: 1.3 MG/DL (ref 0–1.2)
BUN SERPL-MCNC: 8 MG/DL (ref 8–23)
BUN/CREAT SERPL: 15.1 (ref 7–25)
CALCIUM SPEC-SCNC: 8.9 MG/DL (ref 8.6–10.5)
CHLORIDE SERPL-SCNC: 114 MMOL/L (ref 98–107)
CO2 SERPL-SCNC: 24.1 MMOL/L (ref 22–29)
CREAT SERPL-MCNC: 0.53 MG/DL (ref 0.57–1)
DEPRECATED RDW RBC AUTO: 43.6 FL (ref 37–54)
EGFRCR SERPLBLD CKD-EPI 2021: 95.4 ML/MIN/1.73
EOSINOPHIL # BLD AUTO: 0.08 10*3/MM3 (ref 0–0.4)
EOSINOPHIL NFR BLD AUTO: 1.6 % (ref 0.3–6.2)
ERYTHROCYTE [DISTWIDTH] IN BLOOD BY AUTOMATED COUNT: 12.8 % (ref 12.3–15.4)
GLOBULIN UR ELPH-MCNC: 2.3 GM/DL
GLUCOSE SERPL-MCNC: 97 MG/DL (ref 65–99)
HCT VFR BLD AUTO: 38.1 % (ref 34–46.6)
HGB BLD-MCNC: 12.9 G/DL (ref 12–15.9)
IMM GRANULOCYTES # BLD AUTO: 0.01 10*3/MM3 (ref 0–0.05)
IMM GRANULOCYTES NFR BLD AUTO: 0.2 % (ref 0–0.5)
LYMPHOCYTES # BLD AUTO: 1.5 10*3/MM3 (ref 0.7–3.1)
LYMPHOCYTES NFR BLD AUTO: 29.5 % (ref 19.6–45.3)
MCH RBC QN AUTO: 31.3 PG (ref 26.6–33)
MCHC RBC AUTO-ENTMCNC: 33.9 G/DL (ref 31.5–35.7)
MCV RBC AUTO: 92.5 FL (ref 79–97)
MONOCYTES # BLD AUTO: 0.35 10*3/MM3 (ref 0.1–0.9)
MONOCYTES NFR BLD AUTO: 6.9 % (ref 5–12)
NEUTROPHILS NFR BLD AUTO: 3.11 10*3/MM3 (ref 1.7–7)
NEUTROPHILS NFR BLD AUTO: 61.2 % (ref 42.7–76)
NRBC BLD AUTO-RTO: 0 /100 WBC (ref 0–0.2)
PLATELET # BLD AUTO: 176 10*3/MM3 (ref 140–450)
PMV BLD AUTO: 9.5 FL (ref 6–12)
POTASSIUM SERPL-SCNC: 3.5 MMOL/L (ref 3.5–5.2)
PROT SERPL-MCNC: 5.9 G/DL (ref 6–8.5)
RBC # BLD AUTO: 4.12 10*6/MM3 (ref 3.77–5.28)
SODIUM SERPL-SCNC: 146 MMOL/L (ref 136–145)
WBC NRBC COR # BLD: 5.08 10*3/MM3 (ref 3.4–10.8)

## 2023-06-11 PROCEDURE — G0378 HOSPITAL OBSERVATION PER HR: HCPCS

## 2023-06-11 PROCEDURE — 85025 COMPLETE CBC W/AUTO DIFF WBC: CPT | Performed by: INTERNAL MEDICINE

## 2023-06-11 PROCEDURE — 71275 CT ANGIOGRAPHY CHEST: CPT

## 2023-06-11 PROCEDURE — 25510000001 IOPAMIDOL PER 1 ML: Performed by: INTERNAL MEDICINE

## 2023-06-11 PROCEDURE — 25010000002 ENOXAPARIN PER 10 MG: Performed by: INTERNAL MEDICINE

## 2023-06-11 PROCEDURE — 80053 COMPREHEN METABOLIC PANEL: CPT | Performed by: INTERNAL MEDICINE

## 2023-06-11 RX ORDER — LOSARTAN POTASSIUM 25 MG/1
25 TABLET ORAL
Status: DISCONTINUED | OUTPATIENT
Start: 2023-06-11 | End: 2023-06-11

## 2023-06-11 RX ADMIN — Medication 10 ML: at 20:12

## 2023-06-11 RX ADMIN — ATORVASTATIN CALCIUM 10 MG: 20 TABLET, FILM COATED ORAL at 20:11

## 2023-06-11 RX ADMIN — Medication 10 ML: at 08:32

## 2023-06-11 RX ADMIN — AMLODIPINE BESYLATE 5 MG: 5 TABLET ORAL at 08:31

## 2023-06-11 RX ADMIN — IOPAMIDOL 95 ML: 755 INJECTION, SOLUTION INTRAVENOUS at 09:08

## 2023-06-11 RX ADMIN — LIDOCAINE 1 PATCH: 50 PATCH CUTANEOUS at 08:31

## 2023-06-11 RX ADMIN — LOSARTAN POTASSIUM 25 MG: 25 TABLET, FILM COATED ORAL at 13:32

## 2023-06-11 RX ADMIN — ENOXAPARIN SODIUM 40 MG: 100 INJECTION SUBCUTANEOUS at 08:30

## 2023-06-11 NOTE — PROGRESS NOTES
Name: Kamilla Hawthorne ADMIT: 2023   : 1945  PCP: Ishmael Medellin MD    MRN: [id number] LOS: 0 days   AGE/SEX: 77 y.o. female  ROOM: [facility]     Subjective   Subjective   No complaint.  Resolved headache.  Resolved postural dizziness.  No seizures.  No loss of consciousness.  No focal neurological symptoms.  There is no chest pain.  No palpitation.  No ankle edema.  No cough.  Positive shortness of breath.  No fever or chills.    Review of Systems  .  No dysuria or hematuria or urinary incontinence.  GI.  No abdominal pain.  No nausea or vomiting.  Normal bowel movement yesterday without fresh bright blood per rectum or melena.     Objective   Objective   Vital Signs  Temp:  [98 °F (36.7 °C)] 98 °F (36.7 °C)  Heart Rate:  [58-76] 76  Resp:  [18] 18  BP: (129-178)/(63-92) 136/70  SpO2:  [95 %-96 %] 96 %  on   ;   Device (Oxygen Therapy): room air  No intake or output data in the 24 hours ending 23 1415    Body mass index is 24.62 kg/m².      06/10/23  0812 06/10/23  1557 23  0445   Weight: 69.5 kg (153 lb 4.8 oz) 69.4 kg (153 lb) 69.2 kg (152 lb 8.9 oz)     Physical Exam  General.  Elderly female.  Alert and oriented x3.  In no apparent pain/distress/diaphoresis.  Normal mood and affect.  HEENT.  Status post bilateral cataract surgery.  Pupils equal round and reactive.  Intact extraocular musculature.  No pallor or jaundice.  Oral cavity with moist mucous membrane and no lesions.  There is a small scalp hematoma on the left temporal area.  Neck.  Supple.  No JVD.  No lymphadenopathy or thyromegaly.  No carotid bruit.  No C-spine tenderness.  Cardiovascular.  Regular rate and rhythm with no gallops or murmurs.  Chest.  Clear to auscultation bilaterally with no added sounds.  Abdomen.  Soft lax.  No tenderness.  No organomegaly.  No guarding or rebound.  Extremities.  No clubbing/cyanosis/edema.  CNS.  No acute focal neurological deficits.  Intact motor/sensory/cranial nerve/cerebellar  systems.  Gait and Romberg not assessed.    Results Review:      Results from last 7 days   Lab Units 06/11/23  0430 06/09/23  1027   SODIUM mmol/L 146* 144   POTASSIUM mmol/L 3.5 4.1   CHLORIDE mmol/L 114* 111*   CO2 mmol/L 24.1 25.0   BUN mg/dL 8 14   CREATININE mg/dL 0.53* 0.72   GLUCOSE mg/dL 97 114*   CALCIUM mg/dL 8.9 8.3*   AST (SGOT) U/L 18 20   ALT (SGPT) U/L 17 18       Estimated Creatinine Clearance: 97.1 mL/min (A) (by C-G formula based on SCr of 0.53 mg/dL (L)).  Results from last 7 days   Lab Units 06/10/23  1316   HEMOGLOBIN A1C % 5.10         Results from last 7 days   Lab Units 06/09/23  1846 06/09/23  1640 06/09/23  1027   HSTROP T ng/L 41* 37* 33*               Results from last 7 days   Lab Units 06/09/23  1027   MAGNESIUM mg/dL 1.8             Invalid input(s): LDLCALC  Results from last 7 days   Lab Units 06/11/23  0430 06/09/23  1027   WBC 10*3/mm3 5.08 7.65   HEMOGLOBIN g/dL 12.9 15.1   HEMATOCRIT % 38.1 45.4   PLATELETS 10*3/mm3 176 198   MCV fL 92.5 93.8   MCH pg 31.3 31.2   MCHC g/dL 33.9 33.3   RDW % 12.8 12.8   RDW-SD fl 43.6 44.0   MPV fL 9.5 9.5   NEUTROPHIL % % 61.2 79.5*   LYMPHOCYTE % % 29.5 14.0*   MONOCYTES % % 6.9 4.4*   EOSINOPHIL % % 1.6 0.9   BASOPHIL % % 0.6 0.7   IMM GRAN % % 0.2 0.5   NEUTROS ABS 10*3/mm3 3.11 6.08   LYMPHS ABS 10*3/mm3 1.50 1.07   MONOS ABS 10*3/mm3 0.35 0.34   EOS ABS 10*3/mm3 0.08 0.07   BASOS ABS 10*3/mm3 0.03 0.05   IMMATURE GRANS (ABS) 10*3/mm3 0.01 0.04   NRBC /100 WBC 0.0 0.0                                             Imaging:  Imaging Results (Last 24 Hours)       Procedure Component Value Units Date/Time    CT Angiogram Chest [[id number]] Collected: 06/11/23 1022     Updated: 06/11/23 1109    Narrative:      CT ANGIOGRAM OF THE CHEST. MULTIPLE CORONAL, SAGITTAL, AND 3-D  RECONSTRUCTIONS.     HISTORY: Syncope, positive d-dimer.     TECHNIQUE: Radiation dose reduction techniques were utilized, including  automated exposure control and exposure  modulation based on body size.   CT angiogram of the chest was performed following the administration of  IV contrast. Coronal, sagittal, and 3-D reconstruction images were  obtained.     COMPARISON:  None     FINDINGS: There is no intrapulmonary arterial filling defect to diagnose  a pulmonary embolus. Thoracic aorta exhibits normal caliber. There is no  evidence for dissection. Atherosclerotic calcifications are present and  there are mild coronary artery calcifications. There is no evidence for  mediastinal or hilar miroslava enlargement.     Small pleural effusions layer dependently. Within the right upper lobe  there is abnormal spiculated opacity that measures approximately 3.7 x  2.5 x 2.8 cm. This is suspicious for malignancy. There is mild pulmonary  emphysema. Imaging through the upper abdomen appears within normal  limits. There is multilevel degenerative disc disease with endplate  sclerosis and spur formation within the thoracic spine.       Impression:      1. 3.7 cm spiculated, nodular opacity in the right upper lobe suspicious  for neoplasm, though this could represent pneumonia. Findings need  correlation with clinical data and could be further evaluated with  tissue sampling, PET/CT, short interval follow-up CT.  2. Mild pulmonary emphysema.  3. No evidence for pulmonary thromboembolic disease.  4. Atherosclerotic disease with mild coronary arterial calcifications.     Findings called to Noe nurse caring for the patient on 06/11/2023 at  10:17 AM.     This report was finalized on 6/11/2023 11:06 AM by Dr. Vignesh Arias M.D.       MRI Brain With & Without Contrast [[id number]] Collected: 06/10/23 2339     Updated: 06/10/23 2339    Narrative:        Patient: MYESHA MATOS  Time Out: 23:38  Exam(s): MRI HEAD W WO Contrast     EXAM:    MR Head Without and With Intravenous Contrast    CLINICAL HISTORY:     Reason for exam: syncope.    TECHNIQUE:    Magnetic resonance images of the head brain without  and with   intravenous contrast in multiple planes.    COMPARISON:    Correlation with CT head 06 09 23    FINDINGS:    Brain:  Negative for mass, mass-effect, foci of restricted diffusion or   foci of pathologic contrast enhancement.  The major intracranial vascular   flow voids are preserved.  Patchy and some confluent foci of increased T2   and flare signal in the periventricular white matter.  Finding compatible   with sequela of chronic small vessel disease.  No hemorrhage.    Ventricles:  Unremarkable.  No ventriculomegaly.    Bones joints:  Unremarkable.    Soft tissues:  Left frontal scalp hematoma measuring approximately 12 x   25 mm.    Sinuses:  Unremarkable as visualized.  No acute sinusitis.    Mastoid air cells:  Bilateral mastoid effusions.    Orbits:  Unremarkable as visualized.    IMPRESSION:         No acute intracranial abnormality.      Impression:          Electronically signed by José Mcmullen DOomatdameon [employer]   [employer] on 06-10-23 at 2338               I reviewed the patient's new clinical results / labs / tests / procedures      Assessment/Plan     Active Hospital Problems    Diagnosis  POA    **Syncope, unspecified syncope type [R55]  Yes      Resolved Hospital Problems   No resolved problems to display.           Syncope.  Leading to head trauma.  Unclear etiology.  Orthostatics are negative.  EKG with normal sinus rhythm.  Negative CNS examination and cardiac examination.  Aortic abdominal ultrasound negative for AAA.  CT scan of the brain without acute disease.  Positive D-dimer and positive elevation of the troponin.  CTA of the chest is negative for PE.  2D echo with normal ejection fraction no significant valvular disease or wall motion abnormality.  MRI of the brain without acute disease.  EEG with  There is no evidence of angina or congestive heart failure.  Neurology consult is noted and appreciated.  Cardiology recommends Zio patch at discharge.  No recurrence  of syncope.  Incidental left lung mass suspicious of bronchogenic carcinoma.  Stable respiratory status.  Will ask pulmonary for opinion regarding observation versus biopsy at this time.  Low back pain.  X-ray with degenerative disc disease and no fractures.  Aortic ultrasound negative for AAA.  Resolved on Tylenol and Lidoderm patch.  On physical therapy.  Hypertension.  No orthostasis.  Was uncontrolled controlled now with the addition of the Norvasc.  No clinical evidence of angina or congestive heart failure.  Hyperlipidemia.  Continue Lipitor.  Hyperglycemia.  Resolved.  A1c normal at 5.1.  Hyperbilirubinemia.  Asymptomatic with benign GI examination.  Will monitor.  VTE prophylaxis.  On Lovenox prophylaxis.    Discussed my findings and plan of treatment with the patient/daughters.  Position.  Anticipate discharge home tomorrow if no recurrence of syncope/normal carotid ultrasound/cleared by cardiology and pulmonary.      Clarissa Shaw MD  [facility]  06/11/23  14:15 EDT

## 2023-06-11 NOTE — PROGRESS NOTES
"[facility]    Patient Name: Kamilla Hawthorne  :1945  77 y.o.  LOS: 0  Encounter Provider: Estuardo Lazaro Jr, MD      Patient Care Team:  Ishmael Medellin MD as PCP - General (Internal Medicine)  Ishmael Lozano Jr., MD (Dermatology)  Suhail Rothman MD as Consulting Physician (Gastroenterology)    Chief Complaint: Follow-up syncope, uncontrolled hypertension, cognitive dysfunction, elevated D-dimer    Interval History: Elevated D-dimer was not investigated with venous Doppler and CTA chest.  No evidence of thrombosis.  Echocardiogram shows normal EF with no significant valvular heart disease.  Minimally elevated high-sensitivity troponin with normal echo, no ischemic changes on EKG and no angina.  No indication for ischemic work-up at this time.  Very poorly controlled blood pressure.  Negative orthostatic measurements.       Objective   Vital Signs  Temp:  [98 °F (36.7 °C)] 98 °F (36.7 °C)  Heart Rate:  [58-76] 76  Resp:  [18] 18  BP: (129-178)/(63-92) 136/70  No intake or output data in the 24 hours ending 23 1437  Flowsheet Rows      Flowsheet Row First Filed Value   Admission Height 167.6 cm (66\") Documented at 2023 1030   Admission Weight 68.5 kg (151 lb) Documented at 2023 1030              Vitals reviewed.   Constitutional:       Appearance: Healthy appearance. Not in distress.   Neck:      Vascular: No JVR. JVD normal.   Pulmonary:      Effort: Pulmonary effort is normal.      Breath sounds: Normal breath sounds. No wheezing. No rhonchi. No rales.   Chest:      Chest wall: Not tender to palpatation.   Cardiovascular:      PMI at left midclavicular line. Normal rate. Regular rhythm. Normal S1. Normal S2.       Murmurs: There is no murmur.      No gallop.  No click. No rub.   Pulses:     Intact distal pulses.   Edema:     Peripheral edema absent.   Abdominal:      General: Bowel sounds are normal.      Palpations: Abdomen is soft.      Tenderness: There is no " abdominal tenderness.   Musculoskeletal: Normal range of motion.         General: No tenderness. Skin:     General: Skin is warm and dry.   Neurological:      General: No focal deficit present.      Mental Status: Alert and oriented to person, place and time.         Pertinent Test Results:  Results from last 7 days   Lab Units 06/11/23  0430 06/09/23  1027   SODIUM mmol/L 146* 144   POTASSIUM mmol/L 3.5 4.1   CHLORIDE mmol/L 114* 111*   CO2 mmol/L 24.1 25.0   BUN mg/dL 8 14   CREATININE mg/dL 0.53* 0.72   GLUCOSE mg/dL 97 114*   CALCIUM mg/dL 8.9 8.3*   AST (SGOT) U/L 18 20   ALT (SGPT) U/L 17 18     Results from last 7 days   Lab Units 06/09/23  1846 06/09/23  1640 06/09/23  1027   HSTROP T ng/L 41* 37* 33*     Results from last 7 days   Lab Units 06/11/23  0430 06/09/23  1027   WBC 10*3/mm3 5.08 7.65   HEMOGLOBIN g/dL 12.9 15.1   HEMATOCRIT % 38.1 45.4   PLATELETS 10*3/mm3 176 198         Results from last 7 days   Lab Units 06/09/23  1027   MAGNESIUM mg/dL 1.8           Invalid input(s): LDLCALC                Medication Review:   amLODIPine, 5 mg, Oral, Q24H  atorvastatin, 10 mg, Oral, Nightly  enoxaparin, 40 mg, Subcutaneous, Daily  latanoprost, 1 drop, Both Eyes, Nightly  lidocaine, 1 patch, Transdermal, Q24H  losartan, 25 mg, Oral, Q24H  senna-docusate sodium, 2 tablet, Oral, BID  sodium chloride, 10 mL, Intravenous, Q12H         sodium chloride, 100 mL/hr, Last Rate: 100 mL/hr (06/11/23 0543)        Assessment & Plan     Active Hospital Problems    Diagnosis  POA    **Syncope, unspecified syncope type [R55]  Yes      Resolved Hospital Problems   No resolved problems to display.        Syncope -etiology uncertain.  She certainly has some difficulty with cognition but her daughter is at bedside and tells me that she feels it is age-appropriate.  During our conversation she had multiple times where she had difficulty remembering details.  It is also possible that she has mild postconcussion syndrome after  hitting her head.  No acute intracranial pathology on imaging.  Prominent left frontal scalp hematoma is noted.  EEG was performed.  MRI negative for acute intracranial pathology.  She does not clearly associate dizziness complaints with postural change.  Her orthostatics were normal this morning but she has already received IV fluids.  The overall story seems more consistent with dysautonomia than orthostatic hypotension.  No arrhythmia on telemetry thus far.  Echocardiogram normal heart function and no significant valvular heart disease.  She will need a Zio patch at discharge.  Minimally elevated high-sensitivity troponin with no other clinical details to suggest ischemic heart disease.  No indication for ischemic work-up at this time.  Hypertension -difficult scenario because she is having frequent episodes of near syncope.  Amlodipine was started.  We will monitor closely.  Cognitive dysfunction  Elevated D-dimer - low suspicion for PE.  CTA chest and venous Doppler was ordered.    Patient can be discharged at any point from cardiovascular standpoint.  She will need to go on a 7-day telemetry monitor.  We will need to monitor symptoms while on amlodipine but she definitely needs better blood pressure control.    Estuardo Lazaro Jr, MD  [facility]  06/11/23  14:37 EDT

## 2023-06-11 NOTE — PLAN OF CARE
Goal Outcome Evaluation:  Plan of Care Reviewed With: patient        Progress: improving  Outcome Evaluation: Pt VSS. A&Ox4. Chest CT completed this shift. RA. US of carotid artery ordered. Pulmonology consulted due to Chest CT result. No other acute events during shift.

## 2023-06-11 NOTE — CONSULTS
Patient Identification:  Kamilla Hawthorne  77 y.o.  female  1945  [id number]          LOS 0    Requesting physician: Dr Medellin    Reason for Consultation:  Lung mass    History of Present Illness:     77-year-old female with previous history of tobacco use presented to the hospital with syncope.  Admitted by her primary care physician.  Cardiology consulted and they are working up etiology of her syncope.  She denies night sweats, hemoptysis, productive cough, fevers or chills.  She had a CT scan of chest during this hospitalization which I reviewed which shows concerning findings of spiculated mass in the right upper lobe suspicious for malignancy.  I discussed this in detail with her and her .  Previous tobacco history but quit over 20 years ago.    Past Medical History:  Past Medical History:   Diagnosis Date    Colon polyp     Family history of colon cancer     Glaucoma     Hypertension     Melanoma     Mixed hyperlipidemia 2/26/2021       Past Surgical History:  Past Surgical History:   Procedure Laterality Date    COLONOSCOPY      COLONOSCOPY N/A 2/4/2019    Procedure: COLONOSCOPY into cecum and TI with cold biopsy polypectomies;  Surgeon: Suhail Rothman MD;  Location: [facility] ENDOSCOPY;  Service: Gastroenterology    COLONOSCOPY N/A 2/9/2021    Procedure: COLONOSCOPY TO CECUM/TI WITH COLD BX POLYPECTOMIES;  Surgeon: Suhail Rothman MD;  Location: [facility] ENDOSCOPY;  Service: Gastroenterology;  Laterality: N/A;  PERSONAL HX OF POLYPS, FAMILY HX OF COLON CANCER  --DIVERTICULOSIS, POLYPS, INTERNAL HEMORRHOIDS     COLONOSCOPY W/ POLYPECTOMY      LASIK Left         Home Meds:  Medications Prior to Admission   Medication Sig Dispense Refill Last Dose    atorvastatin (LIPITOR) 10 MG tablet Take 1 tablet by mouth Daily. 90 tablet 1 6/8/2023    cholecalciferol (VITAMIN D3) 1000 units tablet Take 1 tablet by mouth Daily.   6/8/2023    dorzolamide-timolol (COSOPT) 22.3-6.8 MG/ML ophthalmic solution INSTILL 1  "DROP BY OPHTHALMIC ROUTE 2 TIMES EVERY DAY INTO BOTH EYES  6 6/9/2023    latanoprost (XALATAN) 0.005 % ophthalmic solution INSTILL 1 DROP BY OPHTHALMIC ROUTE EVERY DAY BOTH EYES AT BEDTIME  6 6/8/2023    vitamin C (ASCORBIC ACID) 500 MG tablet Take 1 tablet by mouth Daily.   6/8/2023         Allergies:  Allergies   Allergen Reactions    Penicillins Itching       Social History:   Social History     Socioeconomic History    Marital status:    Tobacco Use    Smoking status: Former     Types: Cigarettes    Smokeless tobacco: Never   Vaping Use    Vaping Use: Never used   Substance and Sexual Activity    Alcohol use: Yes     Comment: SOCIAL    Drug use: Never    Sexual activity: Defer       Family History:  Family History   Problem Relation Age of Onset    Emphysema Mother     Stroke Father     Malig Hyperthermia Neg Hx        Review of Systems:  Denies fevers or chills  Denies nausea or vomiting  No new vision or hearing changes  No chest pain  No productive cough or shortness of breath  No diarrhea, hematemesis or hematochezia, no dysuria or frequency  No musculoskeletal complaints  No heat or cold intolerance  No skin rashes  No dizziness or confusion.  No seizure activity  No new anxiety or depression  12 system review of systems performed and all else negative    Objective:    PHYSICAL EXAM:    /70 (BP Location: Left arm, Patient Position: Sitting)   Pulse 76   Temp 98 °F (36.7 °C) (Oral)   Resp 18   Ht 167.6 cm (66\")   Wt 69.2 kg (152 lb 8.9 oz)   LMP  (LMP Unknown)   SpO2 96%   BMI 24.62 kg/m²  Body mass index is 24.62 kg/m². 96% 69.2 kg (152 lb 8.9 oz)    GENERAL APPEARANCE:   Well developed  Well nourished  No acute distress   EYES:    PERRL                                                                           Conjunctivae normal  Sclerae nonicteric.  HENT:   Atraumatic, normocephalic  External ears and nose normal  Moist mucous membranes and no ulcers  NECK:  Thyroid not " enlarged  Trachea midline   RESPIRATORY:    Nonlabored breathing   Normal breath sounds  No rales. No wheezing  No dullness  CARDIOVASCULAR:    RRR  Normal S1, S2  No murmur  Lower extremity edema: none    GI:   Bowel sounds normal  Abdomen soft , nondistended, nontender  No abdominal masses  MUSCULOSKELETAL:  Normal movement of extremities  No tenderness, no deformities  No clubbing or cyanosis   Skin:    No visible rashes  No palpable nodules  Cap refill normal.  No mottling.   PSYCHIATRIC:  Speech and behavior appropriate  Normal mood and affect  Oriented to person, place and time  NEUROLOGIC:  Cranial nerves II through XII grossly intact.  Sensation intact.      Lab Review:      Results from last 7 days   Lab Units 06/11/23  0430 06/09/23  1027   WBC 10*3/mm3 5.08 7.65   HEMOGLOBIN g/dL 12.9 15.1   HEMATOCRIT % 38.1 45.4   PLATELETS 10*3/mm3 176 198     Results from last 7 days   Lab Units 06/11/23  0430 06/09/23  1027   SODIUM mmol/L 146* 144   POTASSIUM mmol/L 3.5 4.1   CHLORIDE mmol/L 114* 111*   CO2 mmol/L 24.1 25.0   BUN mg/dL 8 14   CREATININE mg/dL 0.53* 0.72   CALCIUM mg/dL 8.9 8.3*   BILIRUBIN mg/dL 1.3* 1.4*   ALK PHOS U/L 74 81   ALT (SGPT) U/L 17 18   AST (SGOT) U/L 18 20   GLUCOSE mg/dL 97 114*       06/09/2023 1846 06/09/2023 1937 High Sensitivity Troponin T 2Hr [[id number]]    (Abnormal)   Blood    Final result Component Value Units   HS Troponin T 41 High  ng/L   Troponin T Delta 4 High Critical  ng/L                 Imaging reviewed  CT chest shows 3.7 cm spiculated right upper lobe mass.        MRI brain reviewed: No acute    Lower extremity Dopplers reviewed: Normal    2D echo reviewed: EF 57% with normal diastolic function.  No evidence of pulmonary hypertension.          Assessment:  Right upper lobe spiculated mass concerning for malignancy  History of tobacco use  Syncope of uncertain etiology            Recommendations:  Right upper lobe mass certainly concerning for likely malignant  process.  Discussed options for further evaluation and treatment including needle biopsy, bronchoscopy or going straight to surgery.  I think most appropriate would be robotic navigational bronchoscopy.  I will ask radiology to reprocess the CT images performed today with Ion robotic protocol to be able to be used with robotic navigational bronchoscopy when her cardiac issues have stabilized.      Thank you for allowing me to participate in the care of this patient.  I will continue to follow along with you.      Tomas Booker MD  [facility], [facility]  Pulmonary and Critical Care Medicine    6/11/2023  14:30 EDT

## 2023-06-12 PROBLEM — R91.8 LUNG MASS: Status: ACTIVE | Noted: 2023-06-09

## 2023-06-12 LAB
ALBUMIN SERPL-MCNC: 3.9 G/DL (ref 3.5–5.2)
ALBUMIN/GLOB SERPL: 1.6 G/DL
ALP SERPL-CCNC: 80 U/L (ref 39–117)
ALT SERPL W P-5'-P-CCNC: 18 U/L (ref 1–33)
ANION GAP SERPL CALCULATED.3IONS-SCNC: 10 MMOL/L (ref 5–15)
AST SERPL-CCNC: 21 U/L (ref 1–32)
BASOPHILS # BLD AUTO: 0.05 10*3/MM3 (ref 0–0.2)
BASOPHILS NFR BLD AUTO: 0.9 % (ref 0–1.5)
BILIRUB SERPL-MCNC: 1.3 MG/DL (ref 0–1.2)
BUN SERPL-MCNC: 8 MG/DL (ref 8–23)
BUN/CREAT SERPL: 15.4 (ref 7–25)
CALCIUM SPEC-SCNC: 8.7 MG/DL (ref 8.6–10.5)
CHLORIDE SERPL-SCNC: 110 MMOL/L (ref 98–107)
CO2 SERPL-SCNC: 24 MMOL/L (ref 22–29)
CREAT SERPL-MCNC: 0.52 MG/DL (ref 0.57–1)
DEPRECATED RDW RBC AUTO: 43.7 FL (ref 37–54)
EGFRCR SERPLBLD CKD-EPI 2021: 95.8 ML/MIN/1.73
EOSINOPHIL # BLD AUTO: 0.11 10*3/MM3 (ref 0–0.4)
EOSINOPHIL NFR BLD AUTO: 2 % (ref 0.3–6.2)
ERYTHROCYTE [DISTWIDTH] IN BLOOD BY AUTOMATED COUNT: 12.9 % (ref 12.3–15.4)
GLOBULIN UR ELPH-MCNC: 2.4 GM/DL
GLUCOSE SERPL-MCNC: 95 MG/DL (ref 65–99)
HCT VFR BLD AUTO: 41.8 % (ref 34–46.6)
HGB BLD-MCNC: 14.1 G/DL (ref 12–15.9)
IMM GRANULOCYTES # BLD AUTO: 0.01 10*3/MM3 (ref 0–0.05)
IMM GRANULOCYTES NFR BLD AUTO: 0.2 % (ref 0–0.5)
LYMPHOCYTES # BLD AUTO: 1.58 10*3/MM3 (ref 0.7–3.1)
LYMPHOCYTES NFR BLD AUTO: 28.4 % (ref 19.6–45.3)
MAGNESIUM SERPL-MCNC: 1.9 MG/DL (ref 1.6–2.4)
MCH RBC QN AUTO: 31.1 PG (ref 26.6–33)
MCHC RBC AUTO-ENTMCNC: 33.7 G/DL (ref 31.5–35.7)
MCV RBC AUTO: 92.1 FL (ref 79–97)
MONOCYTES # BLD AUTO: 0.42 10*3/MM3 (ref 0.1–0.9)
MONOCYTES NFR BLD AUTO: 7.6 % (ref 5–12)
NEUTROPHILS NFR BLD AUTO: 3.39 10*3/MM3 (ref 1.7–7)
NEUTROPHILS NFR BLD AUTO: 60.9 % (ref 42.7–76)
NRBC BLD AUTO-RTO: 0 /100 WBC (ref 0–0.2)
PLATELET # BLD AUTO: 189 10*3/MM3 (ref 140–450)
PMV BLD AUTO: 9.5 FL (ref 6–12)
POTASSIUM SERPL-SCNC: 3.3 MMOL/L (ref 3.5–5.2)
PROT SERPL-MCNC: 6.3 G/DL (ref 6–8.5)
QT INTERVAL: 339 MS
RBC # BLD AUTO: 4.54 10*6/MM3 (ref 3.77–5.28)
SODIUM SERPL-SCNC: 144 MMOL/L (ref 136–145)
T3FREE SERPL-MCNC: 2.97 PG/ML (ref 2–4.4)
T4 FREE SERPL-MCNC: 1.3 NG/DL (ref 0.93–1.7)
TSH SERPL DL<=0.05 MIU/L-ACNC: 4.09 UIU/ML (ref 0.27–4.2)
VIT B12 BLD-MCNC: 573 PG/ML (ref 211–946)
WBC NRBC COR # BLD: 5.56 10*3/MM3 (ref 3.4–10.8)

## 2023-06-12 PROCEDURE — 84165 PROTEIN E-PHORESIS SERUM: CPT | Performed by: INTERNAL MEDICINE

## 2023-06-12 PROCEDURE — 82607 VITAMIN B-12: CPT | Performed by: INTERNAL MEDICINE

## 2023-06-12 PROCEDURE — 25010000002 ENOXAPARIN PER 10 MG: Performed by: NURSE PRACTITIONER

## 2023-06-12 PROCEDURE — 83735 ASSAY OF MAGNESIUM: CPT | Performed by: NURSE PRACTITIONER

## 2023-06-12 PROCEDURE — 84443 ASSAY THYROID STIM HORMONE: CPT | Performed by: NURSE PRACTITIONER

## 2023-06-12 PROCEDURE — 85025 COMPLETE CBC W/AUTO DIFF WBC: CPT | Performed by: INTERNAL MEDICINE

## 2023-06-12 PROCEDURE — 84439 ASSAY OF FREE THYROXINE: CPT | Performed by: NURSE PRACTITIONER

## 2023-06-12 PROCEDURE — 84481 FREE ASSAY (FT-3): CPT | Performed by: NURSE PRACTITIONER

## 2023-06-12 PROCEDURE — 97110 THERAPEUTIC EXERCISES: CPT

## 2023-06-12 PROCEDURE — 83921 ORGANIC ACID SINGLE QUANT: CPT | Performed by: INTERNAL MEDICINE

## 2023-06-12 PROCEDURE — 80053 COMPREHEN METABOLIC PANEL: CPT | Performed by: INTERNAL MEDICINE

## 2023-06-12 PROCEDURE — 93005 ELECTROCARDIOGRAM TRACING: CPT | Performed by: INTERNAL MEDICINE

## 2023-06-12 PROCEDURE — 86780 TREPONEMA PALLIDUM: CPT | Performed by: INTERNAL MEDICINE

## 2023-06-12 RX ORDER — ENOXAPARIN SODIUM 100 MG/ML
1 INJECTION SUBCUTANEOUS EVERY 12 HOURS
Status: DISCONTINUED | OUTPATIENT
Start: 2023-06-12 | End: 2023-06-14

## 2023-06-12 RX ORDER — VALSARTAN 80 MG/1
80 TABLET ORAL
Status: DISCONTINUED | OUTPATIENT
Start: 2023-06-12 | End: 2023-06-15 | Stop reason: HOSPADM

## 2023-06-12 RX ORDER — POTASSIUM CHLORIDE 750 MG/1
40 TABLET, FILM COATED, EXTENDED RELEASE ORAL ONCE
Status: COMPLETED | OUTPATIENT
Start: 2023-06-12 | End: 2023-06-12

## 2023-06-12 RX ADMIN — LIDOCAINE 1 PATCH: 50 PATCH CUTANEOUS at 09:40

## 2023-06-12 RX ADMIN — VALSARTAN 80 MG: 80 TABLET, FILM COATED ORAL at 09:40

## 2023-06-12 RX ADMIN — POTASSIUM CHLORIDE 40 MEQ: 750 TABLET, EXTENDED RELEASE ORAL at 13:43

## 2023-06-12 RX ADMIN — Medication 10 ML: at 11:47

## 2023-06-12 RX ADMIN — ATORVASTATIN CALCIUM 10 MG: 20 TABLET, FILM COATED ORAL at 20:26

## 2023-06-12 RX ADMIN — METOPROLOL TARTRATE 25 MG: 25 TABLET, FILM COATED ORAL at 08:31

## 2023-06-12 RX ADMIN — ENOXAPARIN SODIUM 70 MG: 100 INJECTION SUBCUTANEOUS at 13:43

## 2023-06-12 RX ADMIN — POTASSIUM CHLORIDE 40 MEQ: 750 TABLET, EXTENDED RELEASE ORAL at 11:07

## 2023-06-12 RX ADMIN — METOPROLOL TARTRATE 5 MG: 1 INJECTION, SOLUTION INTRAVENOUS at 08:31

## 2023-06-12 RX ADMIN — AMLODIPINE BESYLATE 5 MG: 5 TABLET ORAL at 09:40

## 2023-06-12 RX ADMIN — Medication 10 ML: at 20:28

## 2023-06-12 NOTE — PLAN OF CARE
"Goal Outcome Evaluation:  Plan of Care Reviewed With: patient        Progress: no change  Outcome Evaluation: Pt very anxious overnight. Minimal to no sleep was had. Pt got OOB three times anxiously \"getting ready\" for \"bad news\" this morning and an expected visit from family. Pt very worried regarding future plans of care. IV fluids held due to elevated BP and adequate po intake. Potassium 3.3 this morning. Will need replaced.         "

## 2023-06-12 NOTE — PROGRESS NOTES
"   LOS: 0 days    Patient Care Team:  Ishmael Medellin MD as PCP - General (Internal Medicine)  Ishmael Lozano Jr., MD (Dermatology)  Suhail Rothman MD as Consulting Physician (Gastroenterology)    Chief Complaint:    Chief Complaint   Patient presents with   • Syncope     Subjective     Interval History:     Patient Complaints: Chart reviewed from the weekend.  Admitted with recurrent syncope.  Previously felt to be related to cardiogenic syncope but seemed more vasovagal on admission.  Despite that blood pressures been high the entire weekend and there is been no orthostasis or autonomic dysfunction seems less likely.  There has been no further syncope in the hospital but she has not been up much.  She otherwise has no complaints.  Sister-in-law states she has been increasingly confused over the past year or so.  Moving slower than she used to.    Objective     Vital Signs  Temp:  [98.1 °F (36.7 °C)-98.4 °F (36.9 °C)] 98.1 °F (36.7 °C)  Heart Rate:  [] 86  Resp:  [17-18] 17  BP: (135-181)/(3-100) 135/100    Flowsheet Rows    Flowsheet Row First Filed Value   Admission Height 167.6 cm (66\") Documented at 06/09/2023 1030   Admission Weight 68.5 kg (151 lb) Documented at 06/09/2023 1030          No intake/output data recorded.  No intake/output data recorded.  No intake or output data in the 24 hours ending 06/12/23 0945    Physical Exam:   General Appearance:    Alert, pleasant and cooperative, in no acute distress       Eyes:            Conjunctivae and sclerae normal, no   icterus, PERRLA   Neck:          Lungs:     Clear to auscultation,respirations regular, even and                  unlabored    Heart:   Irregularly irregular and slightly tachycardic normal S1 and S2, no murmur, no gallop, no rub, no click       Abdomen:            Extremities:    Pulses:        Lymph nodes:          Results Review:    I reviewed the patient's new clinical results.  I reviewed the patient's new imaging results and agree " with the interpretation.  I personally viewed and interpreted the patient's EKG/Telemetry data  Results from last 7 days   Lab Units 06/12/23  0612 06/11/23  0430 06/09/23  1027   SODIUM mmol/L 144 146* 144   POTASSIUM mmol/L 3.3* 3.5 4.1   CHLORIDE mmol/L 110* 114* 111*   CO2 mmol/L 24.0 24.1 25.0   BUN mg/dL 8 8 14   CREATININE mg/dL 0.52* 0.53* 0.72   CALCIUM mg/dL 8.7 8.9 8.3*   BILIRUBIN mg/dL 1.3* 1.3* 1.4*   ALK PHOS U/L 80 74 81   ALT (SGPT) U/L 18 17 18   AST (SGOT) U/L 21 18 20   GLUCOSE mg/dL 95 97 114*       Estimated Creatinine Clearance: 98.3 mL/min (A) (by C-G formula based on SCr of 0.52 mg/dL (L)).    Results from last 7 days   Lab Units 06/12/23  0826 06/09/23  1027   MAGNESIUM mg/dL 1.9 1.8             Results from last 7 days   Lab Units 06/12/23  0612 06/11/23  0430 06/09/23  1027   WBC 10*3/mm3 5.56 5.08 7.65   HEMOGLOBIN g/dL 14.1 12.9 15.1   PLATELETS 10*3/mm3 189 176 198               Imaging Results (Last 24 Hours)     Procedure Component Value Units Date/Time    CT Angiogram Chest [[id number]] Collected: 06/11/23 1022     Updated: 06/11/23 1109    Narrative:      CT ANGIOGRAM OF THE CHEST. MULTIPLE CORONAL, SAGITTAL, AND 3-D  RECONSTRUCTIONS.     HISTORY: Syncope, positive d-dimer.     TECHNIQUE: Radiation dose reduction techniques were utilized, including  automated exposure control and exposure modulation based on body size.   CT angiogram of the chest was performed following the administration of  IV contrast. Coronal, sagittal, and 3-D reconstruction images were  obtained.     COMPARISON:  None     FINDINGS: There is no intrapulmonary arterial filling defect to diagnose  a pulmonary embolus. Thoracic aorta exhibits normal caliber. There is no  evidence for dissection. Atherosclerotic calcifications are present and  there are mild coronary artery calcifications. There is no evidence for  mediastinal or hilar miroslava enlargement.     Small pleural effusions layer dependently. Within the  right upper lobe  there is abnormal spiculated opacity that measures approximately 3.7 x  2.5 x 2.8 cm. This is suspicious for malignancy. There is mild pulmonary  emphysema. Imaging through the upper abdomen appears within normal  limits. There is multilevel degenerative disc disease with endplate  sclerosis and spur formation within the thoracic spine.       Impression:      1. 3.7 cm spiculated, nodular opacity in the right upper lobe suspicious  for neoplasm, though this could represent pneumonia. Findings need  correlation with clinical data and could be further evaluated with  tissue sampling, PET/CT, short interval follow-up CT.  2. Mild pulmonary emphysema.  3. No evidence for pulmonary thromboembolic disease.  4. Atherosclerotic disease with mild coronary arterial calcifications.     Findings called to Noe nurse caring for the patient on 06/11/2023 at  10:17 AM.     This report was finalized on 6/11/2023 11:06 AM by Dr. Vignesh Arias M.D.           amLODIPine, 5 mg, Oral, Q24H  atorvastatin, 10 mg, Oral, Nightly  latanoprost, 1 drop, Both Eyes, Nightly  lidocaine, 1 patch, Transdermal, Q24H  metoprolol tartrate, 25 mg, Oral, Q12H  senna-docusate sodium, 2 tablet, Oral, BID  sodium chloride, 10 mL, Intravenous, Q12H  valsartan, 80 mg, Oral, Q24H           Medication Review:   Current Facility-Administered Medications   Medication Dose Route Frequency Provider Last Rate Last Admin   • acetaminophen (TYLENOL) tablet 650 mg  650 mg Oral Q6H PRN Clarissa Shaw MD   650 mg at 06/10/23 1157   • amLODIPine (NORVASC) tablet 5 mg  5 mg Oral Q24H Clarissa Shaw MD   5 mg at 06/12/23 0940   • atorvastatin (LIPITOR) tablet 10 mg  10 mg Oral Nightly Ishmael Medellin MD   10 mg at 06/11/23 2011   • sennosides-docusate (PERICOLACE) 8.6-50 MG per tablet 2 tablet  2 tablet Oral BID Ishmael Medellin MD        And   • polyethylene glycol (MIRALAX) packet 17 g  17 g Oral Daily PRN Ishmael Medellin MD        And   •  bisacodyl (DULCOLAX) EC tablet 5 mg  5 mg Oral Daily PRN Ishmael Medellin MD        And   • bisacodyl (DULCOLAX) suppository 10 mg  10 mg Rectal Daily PRN Ishmael Medellin MD       • calcium carbonate (TUMS) chewable tablet 500 mg (200 mg elemental)  2 tablet Oral TID PRN Ishmael Medellin MD       • Calcium Replacement - Follow Nurse / BPA Driven Protocol   Does not apply PRN Sneha Reed APRN       • latanoprost (XALATAN) 0.005 % ophthalmic solution 1 drop  1 drop Both Eyes Nightly Ishmael Medellin MD       • lidocaine (LIDODERM) 5 % 1 patch  1 patch Transdermal Q24H Clarissa Shaw MD   1 patch at 06/12/23 0940   • Magnesium Standard Dose Replacement - Follow Nurse / BPA Driven Protocol   Does not apply PRN Sneha Reed APRN       • metoprolol tartrate (LOPRESSOR) injection 5 mg  5 mg Intravenous Q5 Min PRN Sneha Reed APRN   5 mg at 06/12/23 0831   • metoprolol tartrate (LOPRESSOR) tablet 25 mg  25 mg Oral Q12H Sneha Reed APRN   25 mg at 06/12/23 0831   • nitroglycerin (NITROSTAT) SL tablet 0.4 mg  0.4 mg Sublingual Q5 Min PRN Ishmael Medellin MD       • Phosphorus Replacement - Follow Nurse / BPA Driven Protocol   Does not apply PRN Sneha Reed APRN       • Potassium Replacement - Follow Nurse / BPA Driven Protocol   Does not apply PRN Sneha Reed APRN       • sodium chloride 0.9 % flush 10 mL  10 mL Intravenous PRN Deshaun Pearl MD   10 mL at 06/09/23 1027   • sodium chloride 0.9 % flush 10 mL  10 mL Intravenous Q12H Ishmael Medellin MD   10 mL at 06/11/23 2012   • sodium chloride 0.9 % flush 10 mL  10 mL Intravenous PRN Ishmael Medellin MD       • sodium chloride 0.9 % infusion 40 mL  40 mL Intravenous PRN Ishmael Medellin MD       • valsartan (DIOVAN) tablet 80 mg  80 mg Oral Q24H Ishmael Medellin MD   80 mg at 06/12/23 0940       Assessment & Plan       Syncope, unspecified syncope type  New onset atrial fibrillation this morning with RVR.  Sinus bradycardia through the  weekend.  That raises the issue of sick sinus syndrome and that may well be the cause of her syncope, intermittent bradycardia arrhythmias  Incidental right upper lobe mass concerning for malignancy  Likely recent onset dementia  Hypertension  Distant history of malignant melanoma    She does very poorly today with presidents.  She mentions Deuce.  Cannot name the current president.  Just away off on that type of questioning.  She likely is in the process of developing dementia.  MRI brain unremarkable.  We will get some serologies.  Is beginning look like sick sinus syndrome may be the cause of her syncope, i.e. bradycardia arrhythmias.  She has an underlying sinus bradycardia and now atrial fibrillation with RVR this morning.  Beta-blockers have been added.  7-day Holter monitor planned at discharge.  I see no value of a carotid ultrasound and her syncope evaluation and we will discontinue that.  The question is whether she is going to require a pacemaker.  Right now we have not proven a bradycardia arrhythmia but suspect 1.     It looks to me like her right upper lobe mass would be easily accessible with a transcutaneous biopsy.  I reviewed that with the radiology department today.  That seems like it would be much easier approach then a navigational lung biopsy.  Will discuss with pulmonary service.  Right now I have held her Lovenox this morning to prepare for lung biopsy.  The family would prefer that we keep her in the hospital do that as an inpatient rather than outpatient evaluation which will likely include a PET scan for staging purposes.  It would be nice to get some tissue before she leaves the hospital.      Ishmael Medellin MD  06/12/23  09:45 EDT

## 2023-06-12 NOTE — CASE MANAGEMENT/SOCIAL WORK
Discharge Planning Assessment  [facility]     Patient Name: Kamilla Hawthorne  MRN: [id number]  Today's Date: 6/12/2023    Admit Date: 6/9/2023    Plan: Home with spouse   Discharge Needs Assessment       Row Name 06/12/23 1703       Living Environment    People in Home spouse    Current Living Arrangements condominium    Potentially Unsafe Housing Conditions none    Primary Care Provided by self;spouse/significant other    Provides Primary Care For no one    Family Caregiver if Needed spouse;child(rona), adult    Quality of Family Relationships helpful;involved;supportive    Able to Return to Prior Arrangements yes       Resource/Environmental Concerns    Resource/Environmental Concerns none       Transition Planning    Patient/Family Anticipates Transition to home    Patient/Family Anticipated Services at Transition none    Transportation Anticipated family or friend will provide       Discharge Needs Assessment    Readmission Within the Last 30 Days no previous admission in last 30 days    Equipment Currently Used at Home none    Concerns to be Addressed discharge planning    Anticipated Changes Related to Illness none    Provided Post Acute Provider List? N/A    Provided Post Acute Provider Quality & Resource List? N/A                   Discharge Plan       Row Name 06/12/23 7570       Plan    Plan Home with spouse    Plan Comments S/W pt, her  Estuardo and their dtr Cesario at bedside.  Facesheet and pharmacy info confirmed.  Pt lives in a single story condo with Estuardo and is IADLs.  She has a cane and walker, but does not use them.  Her  provides assist at home as needed and also provides transport.  Their dtrs live locally, work here at [facility].  They check on pt often and can assist if needed.  No hx of HH or SNF.  Pt is ambulating well w/ PT.  She antcipates returning home with her spouse upon DC. She did enroll in Meds to Bed.  CCP will continue to follow and assist if needed.  ..........Deidra IZQUIERDO/  [facility]                  Continued Care and Services - Admitted Since 6/9/2023    Coordination has not been started for this encounter.       Expected Discharge Date and Time       Expected Discharge Date Expected Discharge Time    Jun 14, 2023            Demographic Summary       Row Name 06/12/23 1700       General Information    Admission Type inpatient    Arrived From home    Referral Source admission list    Reason for Consult discharge planning    Preferred Language English                   Functional Status       Row Name 06/12/23 1700       Functional Status    Usual Activity Tolerance good    Current Activity Tolerance good       Functional Status, IADL    Medications independent    Meal Preparation independent;assistive person    Housekeeping independent;assistive person    Laundry independent;assistive person    Shopping independent;assistive person       Mental Status    General Appearance WDL WDL       Employment/    Employment Status retired                             Deidra Castañeda RN

## 2023-06-12 NOTE — CODE DOCUMENTATION
Patient Name:  Kamilla Hawthorne  YOB: 1945  MRN:  [id number]  Admit Date:  6/9/2023    Visit Diagnoses:     ICD-10-CM ICD-9-CM   1. Syncope, unspecified syncope type  R55 780.2   2. Closed head injury, initial encounter  S09.90XA 959.01   3. Abrasion of face, initial encounter  S00.81XA 910.0   4. Pulmonary nodule  R91.1 793.11       Reason For Rapid:   Afib RVR  Rate range 110-140    RN Communicated With:  Cardiology rounding nurse LES Apodaca    Rapid Outcome:  Administer PRN and scheduled dose metoprolol as ordered.    Remain on unit.  Cardiology to see.     Communication From Rapid Team:   Please recall rapid for any worsening symptoms or new concerns      Most Recent Vital Signs  Temp:  [98.1 °F (36.7 °C)-98.4 °F (36.9 °C)] 98.1 °F (36.7 °C)  Heart Rate:  [] 86  Resp:  [17-18] 17  BP: (135-181)/(3-100) 135/100  SpO2:  [94 %-99 %] 95 %  on   ;   Device (Oxygen Therapy): room air    Labs:      No results found for: POCGLU  No results found for: SITE, ALLENTEST, PHART, UTK2NLH, PO2ART, XMN3SAL, BASEEXCESS, K6EPTOOY, HGBBG, HCTABG, OXYHEMOGLOBI, METHHGBN, CARBOXYHGB, CO2CT, BAROMETRIC, MODALITY, FIO2  Results from last 7 days   Lab Units 06/12/23  0612 06/11/23  0430 06/09/23  1027   WBC 10*3/mm3 5.56 5.08 7.65   HEMOGLOBIN g/dL 14.1 12.9 15.1   PLATELETS 10*3/mm3 189 176 198     Results from last 7 days   Lab Units 06/12/23  0612 06/11/23  0430 06/09/23  1027   SODIUM mmol/L 144 146* 144   POTASSIUM mmol/L 3.3* 3.5 4.1   CHLORIDE mmol/L 110* 114* 111*   CO2 mmol/L 24.0 24.1 25.0   BUN mg/dL 8 8 14   CREATININE mg/dL 0.52* 0.53* 0.72   GLUCOSE mg/dL 95 97 114*   ALBUMIN g/dL 3.9 3.6 3.8   BILIRUBIN mg/dL 1.3* 1.3* 1.4*   ALK PHOS U/L 80 74 81   AST (SGOT) U/L 21 18 20   ALT (SGPT) U/L 18 17 18   Estimated Creatinine Clearance: 98.3 mL/min (A) (by C-G formula based on SCr of 0.52 mg/dL (L)).  Results from last 7 days   Lab Units 06/09/23  1846 06/09/23  1640 06/09/23  1027   HSTROP T ng/L 41* 37*  33*   D DIMER QUANT MCGFEU/mL  --  0.87*  --          No results found for: STREPPNEUAG, LEGANTIGENUR          Please refer to full rapid documentation on summary page under Index / Code Timeline

## 2023-06-12 NOTE — THERAPY TREATMENT NOTE
Patient Name: Kamilla Hawthorne  : 1945    MRN: [id number]                              Today's Date: 2023       Admit Date: 2023    Visit Dx:     ICD-10-CM ICD-9-CM   1. Syncope, unspecified syncope type  R55 780.2   2. Closed head injury, initial encounter  S09.90XA 959.01   3. Abrasion of face, initial encounter  S00.81XA 910.0   4. Pulmonary nodule  R91.1 793.11   5. Lung mass  R91.8 786.6     Patient Active Problem List   Diagnosis    Essential hypertension    History of melanoma    History of adenomatous polyp of colon    Family history of colon cancer    Mixed hyperlipidemia    Syncope, unspecified syncope type    Lung mass     Past Medical History:   Diagnosis Date    Colon polyp     Family history of colon cancer     Glaucoma     Hypertension     Melanoma     Mixed hyperlipidemia 2021     Past Surgical History:   Procedure Laterality Date    COLONOSCOPY      COLONOSCOPY N/A 2019    Procedure: COLONOSCOPY into cecum and TI with cold biopsy polypectomies;  Surgeon: Suhail Rothman MD;  Location: [facility] ENDOSCOPY;  Service: Gastroenterology    COLONOSCOPY N/A 2021    Procedure: COLONOSCOPY TO CECUM/TI WITH COLD BX POLYPECTOMIES;  Surgeon: Suhail Rothman MD;  Location: [facility] ENDOSCOPY;  Service: Gastroenterology;  Laterality: N/A;  PERSONAL HX OF POLYPS, FAMILY HX OF COLON CANCER  --DIVERTICULOSIS, POLYPS, INTERNAL HEMORRHOIDS     COLONOSCOPY W/ POLYPECTOMY      LASIK Left       General Information       Row Name 23 1536          Physical Therapy Time and Intention    Document Type therapy note (daily note)  -PC     Mode of Treatment physical therapy  -PC               User Key  (r) = Recorded By, (t) = Taken By, (c) = Cosigned By      Initials Name Provider Type    PC Janel Morfin PT Physical Therapist                   Mobility       Row Name 23 1537          Bed Mobility    Supine-Sit [place] (Bed Mobility) independent  -PC     Sit-Supine [place] (Bed  Mobility) independent  -PC       Row Name 06/12/23 1537          Sit-Stand Transfer    Sit-Stand [place] (Transfers) standby assist  -PC       Row Name 06/12/23 1537          Gait/Stairs (Locomotion)    [place] Level (Gait) standby assist  -PC     Distance in Feet (Gait) 300 ft  -PC     Deviations/Abnormal Patterns (Gait) base of support, wide  -PC     Comment, (Gait/Stairs) no loss of balance  -PC               User Key  (r) = Recorded By, (t) = Taken By, (c) = Cosigned By      Initials Name Provider Type    PC Janel Morfin, PT Physical Therapist                   Obj/Interventions    No documentation.                  Goals/Plan    No documentation.                  Clinical Impression       Row Name 06/12/23 1549          Pain    Pretreatment Pain Rating 0/10 - no pain  -PC       Row Name 06/12/23 1549          Plan of Care Review    Plan of Care Reviewed With patient  -PC     Progress improving  -PC     Outcome Evaluation Pt with medical work up ongoing, pt's mobility is near baseline and she was able to walk 300 ft with stand by assist, PT will follow up in a few days to ensure safe mobility, pt is safe to walk with family/nursing staff  -       Row Name 06/12/23 1549          Therapy Assessment/Plan (PT)    Therapy Frequency (PT) 2 times/day  -PC       Row Name 06/12/23 1549          Positioning and Restraints    Pre-Treatment Position in bed  -PC     Post Treatment Position bed  -PC     In Bed supine;call light within reach;encouraged to call for assist;exit alarm on;with family/caregiver  -PC               User Key  (r) = Recorded By, (t) = Taken By, (c) = Cosigned By      Initials Name Provider Type    PC Janel Morfin, PT Physical Therapist                   Outcome Measures       Row Name 06/12/23 1552 06/12/23 1348       How much help from another person do you currently need...    Turning from your back to your side while in flat bed without using bedrails? 4  -PC 4  -AW    Moving from  lying on back to sitting on the side of a flat bed without bedrails? 4  -PC 4  -AW    Moving to and from a bed to a chair (including a wheelchair)? 4  -PC 4  -AW    Standing up from a chair using your arms (e.g., wheelchair, bedside chair)? 4  -PC 4  -AW    Climbing 3-5 steps with a railing? 3  -PC 3  -AW    To walk in hospital room? 3  -PC 3  -AW    AM-PAC 6 Clicks Score (PT) 22  -PC 22  -AW    Highest level of mobility 7 --> Walked 25 feet or more  -PC 7 --> Walked 25 feet or more  -AW              User Key  (r) = Recorded By, (t) = Taken By, (c) = Cosigned By      Initials Name Provider Type    PC Janel Morfin, PT Physical Therapist    AW Gosia Wylie RN Registered Nurse                                 Physical Therapy Education       Title: PT OT SLP Therapies (Done)       Topic: Physical Therapy (Done)       Point: Mobility training (Done)       Learning Progress Summary             Patient Acceptance, E, VU by TL at 6/11/2023 1303    Acceptance, E,D, DU by  at 6/10/2023 1335                         Point: Home exercise program (Done)       Learning Progress Summary             Patient Acceptance, E, VU by TL at 6/11/2023 1303    Acceptance, E,D, DU by PC at 6/10/2023 1335                         Point: Body mechanics (Done)       Learning Progress Summary             Patient Acceptance, E, VU by TL at 6/11/2023 1303    Acceptance, E,D, DU by PC at 6/10/2023 1335                         Point: Precautions (Done)       Learning Progress Summary             Patient Acceptance, E, VU by TL at 6/11/2023 1303    Acceptance, E,D, DU by  at 6/10/2023 1335                                         User Key       Initials Effective Dates Name Provider Type Discipline    PC 06/16/21 -  Janel Morfin PT Physical Therapist PT    TL 12/27/22 -  Noe Franco, ANGELITO Registered Nurse Nurse                  PT Recommendation and Plan  Planned Therapy Interventions (PT): gait training, strengthening  Plan of Care Reviewed  With: patient  Progress: improving  Outcome Evaluation: Pt with medical work up ongoing, pt's mobility is near baseline and she was able to walk 300 ft with stand by assist, PT will follow up in a few days to ensure safe mobility, pt is safe to walk with family/nursing staff     Time Calculation:    PT Charges       Row Name 06/12/23 1553             Time Calculation    Start Time 1456  -PC      Stop Time 1511  -PC      Time Calculation (min) 15 min  -PC      PT Received On 06/12/23  -PC      PT - Next Appointment 06/15/23  -PC                User Key  (r) = Recorded By, (t) = Taken By, (c) = Cosigned By      Initials Name Provider Type    PC Janel Morfin PT Physical Therapist                  Therapy Charges for Today       Code Description Service Date Service Provider Modifiers Qty    17328056906 HC PT THER PROC EA 15 MIN 6/12/2023 Janel Morfin PT GP 1            PT G-Codes  AM-PAC 6 Clicks Score (PT): 22  PT Discharge Summary  Anticipated Discharge Disposition (PT): home    Janel Morfin PT  6/12/2023

## 2023-06-12 NOTE — PROGRESS NOTES
"[facility]    Patient Name: Kamilla Hawthorne  :1945  77 y.o.  LOS: 0  Encounter Provider: BELLA Christianson      Patient Care Team:  Ishmael Medellin MD as PCP - General (Internal Medicine)  Ishmael Lozano Jr., MD (Dermatology)  Suhail Rothman MD as Consulting Physician (Gastroenterology)    Chief Complaint:  Syncope    Interval History: Rapid Response called this am for new onset atrial fibrillation with RVR.  HR now controlled with beta-blocker.        Objective   Vital Signs  Temp:  [98.1 °F (36.7 °C)-98.4 °F (36.9 °C)] 98.1 °F (36.7 °C)  Heart Rate:  [] 86  Resp:  [17-18] 17  BP: (135-181)/(3-100) 135/100  No intake or output data in the 24 hours ending 23 0904  Flowsheet Rows      Flowsheet Row First Filed Value   Admission Height 167.6 cm (66\") Documented at 2023 1030   Admission Weight 68.5 kg (151 lb) Documented at 2023 1030              Vitals and nursing note reviewed.   Constitutional:       Appearance: Normal appearance. Well-developed.   Eyes:      Conjunctiva/sclera: Conjunctivae normal.   Neck:      Vascular: No carotid bruit.   Pulmonary:      Breath sounds: Normal breath sounds.   Cardiovascular:      Normal rate. Irregularly irregular rhythm. Normal S1 with normal intensity. Normal S2 with normal intensity.       Murmurs: There is no murmur.      No gallop.  No click. No rub.   Edema:     Peripheral edema absent.   Musculoskeletal: Normal range of motion. Skin:     General: Skin is warm and dry.   Neurological:      Mental Status: Alert and oriented to person, place, and time.      GCS: GCS eye subscore is 4. GCS verbal subscore is 5. GCS motor subscore is 6.   Psychiatric:         Speech: Speech normal.         Behavior: Behavior normal.         Thought Content: Thought content normal.         Judgment: Judgment normal.         Pertinent Test Results:  Results from last 7 days   Lab Units 23  0612 23  0430 23  1027 "   SODIUM mmol/L 144 146* 144   POTASSIUM mmol/L 3.3* 3.5 4.1   CHLORIDE mmol/L 110* 114* 111*   CO2 mmol/L 24.0 24.1 25.0   BUN mg/dL 8 8 14   CREATININE mg/dL 0.52* 0.53* 0.72   GLUCOSE mg/dL 95 97 114*   CALCIUM mg/dL 8.7 8.9 8.3*   AST (SGOT) U/L 21 18 20   ALT (SGPT) U/L 18 17 18     Results from last 7 days   Lab Units 06/09/23  1846 06/09/23  1640 06/09/23  1027   HSTROP T ng/L 41* 37* 33*     Results from last 7 days   Lab Units 06/12/23  0612 06/11/23  0430 06/09/23  1027   WBC 10*3/mm3 5.56 5.08 7.65   HEMOGLOBIN g/dL 14.1 12.9 15.1   HEMATOCRIT % 41.8 38.1 45.4   PLATELETS 10*3/mm3 189 176 198         Results from last 7 days   Lab Units 06/09/23  1027   MAGNESIUM mg/dL 1.8           Invalid input(s): LDLCALC                  Medication Review:   amLODIPine, 5 mg, Oral, Q24H  atorvastatin, 10 mg, Oral, Nightly  latanoprost, 1 drop, Both Eyes, Nightly  lidocaine, 1 patch, Transdermal, Q24H  metoprolol tartrate, 25 mg, Oral, Q12H  senna-docusate sodium, 2 tablet, Oral, BID  sodium chloride, 10 mL, Intravenous, Q12H  valsartan, 80 mg, Oral, Q24H              Assessment & Plan     Active Hospital Problems    Diagnosis  POA    **Syncope, unspecified syncope type [R55]  Yes      Resolved Hospital Problems   No resolved problems to display.        Syncope  New Onset AF  HTN  Cognitive dysfunction  Elevated dimer      Rapid Response called on patient this am for new onset AF with RVR.  This may have been culprit for patient syncope, ryanne if she had a conversion pause.BB given and initiated.    Heart rate is now controlled in the 90s with beta-blocker.  Remains in atrial fibrillation.  Patient and family extensively educated on atrial fibrillation pathophysiology as well as risks associated.  Add thyroid studies.  Consider home sleep study to assess for GUEVARA in the outpatient setting.    Pulmonology planning for robotic bronchoscopy during this admission.  Patient was educated on need for systemic anticoagulation.   We will place patient on apixaban 5 mg twice daily after bronchoscopy and when okay with pulmonology.  Can utilize enoxaparin in the interim.  Await planning for bronchoscopy.    Echocardiogram this admission reveals preserved LVEF.  Normal biatrial size seen.  Normal structure and function of the heart valves.    The following information and educational sheets were given to the patient and family?    Visit https://www.cardiosmart.org/topics/atrial-fibrillation to Learn More about Atrial Fibrillation.     Atrial Fibrillation Fact Sheet    Atrial Fibrillation Stroke and Blood Thinnker Risk Assessment Sheet    Atrial Fibrillation 10 Things to Know      BELLA Christianson  [facility]  06/12/23  09:04 EDT      Time Spent: I spent 35 minutes caring for Kamilla on this date of service. This time includes time spent by me in the following activities: reviewing tests, performing a medically appropriate examination and/or evaluation, counseling and educating the patient/family/caregiver, ordering medications, tests, or procedures, and documenting information in the medical record.

## 2023-06-12 NOTE — PLAN OF CARE
Goal Outcome Evaluation:  Plan of Care Reviewed With: patient        Progress: improving  Outcome Evaluation: Pt with medical work up ongoing, pt's mobility is near baseline and she was able to walk 300 ft with stand by assist, PT will follow up in a few days to ensure safe mobility, pt is safe to walk with family/nursing staff

## 2023-06-12 NOTE — PROGRESS NOTES
[facility] INPATIENT PROGRESS NOTE         [address]    2023      PATIENT IDENTIFICATION:  Name: Kamilla Hawthorne ADMIT: 2023   : 1945  PCP: Ishmael Medellin MD    MRN: [id number] LOS: 0 days   AGE/SEX: 77 y.o. female  ROOM: [facility]                     LOS 0    Reason for visit: Lung mass      SUBJECTIVE:      Noted rapid response called this morning due to significant atrial fibrillation and tachycardia.  This is likely the etiology of her syncope.  Cardiology is working up further.  Discussed with her at and family about bronchoscopy again which we will plan on doing robotic procedure once her cardiac issues are clarified.  Still need to get the radiology department to reprocess the images with the Ion robotic protocol.  I requested that they do that yesterday but it has not been done.    Objective   OBJECTIVE:    Vital Sign Min/Max for last 24 hours  Temp  Min: 98.1 °F (36.7 °C)  Max: 98.4 °F (36.9 °C)   BP  Min: 135/100  Max: 181/89   Pulse  Min: 58  Max: 142   Resp  Min: 17  Max: 18   SpO2  Min: 94 %  Max: 99 %   No data recorded   Weight  Min: 68.7 kg (151 lb 7.3 oz)  Max: 68.7 kg (151 lb 7.3 oz)                         Body mass index is 24.45 kg/m².  No intake or output data in the 24 hours ending 23 0853      Exam:  GEN:  No distress, appears stated age  EYES:   PERRL, anicteric sclerae  ENT:    External ears/nose normal, OP clear  NECK:  No adenopathy, midline trachea  LUNGS: Normal chest on inspection, palpation and auscultation  CV:  Irregularly irregular, without murmur  ABD:  Nontender, nondistended, no hepatosplenomegaly, +BS  EXT:  No edema.  No cyanosis or clubbing.  No mottling and normal cap refill.    Assessment     Scheduled meds:  amLODIPine, 5 mg, Oral, Q24H  atorvastatin, 10 mg, Oral, Nightly  latanoprost, 1 drop, Both Eyes, Nightly  lidocaine, 1 patch, Transdermal, Q24H  metoprolol tartrate, 25 mg, Oral, Q12H  senna-docusate sodium, 2 tablet, Oral,  BID  sodium chloride, 10 mL, Intravenous, Q12H  valsartan, 80 mg, Oral, Q24H      IV meds:                         Data Review:  Results from last 7 days   Lab Units 06/12/23  0612 06/11/23  0430 06/09/23  1027   SODIUM mmol/L 144 146* 144   POTASSIUM mmol/L 3.3* 3.5 4.1   CHLORIDE mmol/L 110* 114* 111*   CO2 mmol/L 24.0 24.1 25.0   BUN mg/dL 8 8 14   CREATININE mg/dL 0.52* 0.53* 0.72   GLUCOSE mg/dL 95 97 114*   CALCIUM mg/dL 8.7 8.9 8.3*         Estimated Creatinine Clearance: 98.3 mL/min (A) (by C-G formula based on SCr of 0.52 mg/dL (L)).  Results from last 7 days   Lab Units 06/12/23  0612 06/11/23  0430 06/09/23  1027   WBC 10*3/mm3 5.56 5.08 7.65   HEMOGLOBIN g/dL 14.1 12.9 15.1   PLATELETS 10*3/mm3 189 176 198         Results from last 7 days   Lab Units 06/12/23  0612 06/11/23  0430 06/09/23  1027   ALT (SGPT) U/L 18 17 18   AST (SGOT) U/L 21 18 20                 Hemoglobin A1C   Date/Time Value Ref Range Status   06/10/2023 1316 5.10 4.80 - 5.60 % Final       CT chest shows 3.7 cm spiculated right upper lobe mass.                  Active Hospital Problems    Diagnosis  POA    **Syncope, unspecified syncope type [R55]  Yes      Resolved Hospital Problems   No resolved problems to display.         ASSESSMENT:    Right upper lobe spiculated mass concerning for malignancy  History of tobacco use  Syncope: Likely secondary to atrial fibrillation        PLAN:    Plan for robotic bronchoscopy for tissue sampling of right upper lung mass once her cardiac issues are ironed out.  Trying to get the radiology department to reprocess previous CT scan performed yesterday as Ion robotic protocol.  If they are unable to do that we will have to repeat a CT.  Significant amount of lung to go through for CT-guided needle biopsy and I think that her risk for complication with CT-guided needle biopsy is significant.  The better option is for robotic bronchoscopy.  Discussed with Dr. Medellin.        Tomas Booker MD  Pulmonary and  Critical Care Medicine  [facility], [facility]  6/12/2023    08:53 EDT

## 2023-06-13 ENCOUNTER — APPOINTMENT (OUTPATIENT)
Dept: CT IMAGING | Facility: HOSPITAL | Age: 78
DRG: 264 | End: 2023-06-13
Payer: MEDICARE

## 2023-06-13 LAB
ALBUMIN SERPL ELPH-MCNC: 3.8 G/DL (ref 2.9–4.4)
ALBUMIN/GLOB SERPL: 1.2 {RATIO} (ref 0.7–1.7)
ALPHA1 GLOB SERPL ELPH-MCNC: 0.3 G/DL (ref 0–0.4)
ALPHA2 GLOB SERPL ELPH-MCNC: 0.8 G/DL (ref 0.4–1)
B-GLOBULIN SERPL ELPH-MCNC: 1 G/DL (ref 0.7–1.3)
GAMMA GLOB SERPL ELPH-MCNC: 1.1 G/DL (ref 0.4–1.8)
GLOBULIN SER CALC-MCNC: 3.2 G/DL (ref 2.2–3.9)
LABORATORY COMMENT REPORT: NORMAL
M PROTEIN SERPL ELPH-MCNC: NORMAL G/DL
MAGNESIUM SERPL-MCNC: 1.8 MG/DL (ref 1.6–2.4)
PROT PATTERN SERPL ELPH-IMP: NORMAL
PROT SERPL-MCNC: 7 G/DL (ref 6–8.5)
T PALLIDUM AB SER QL IF: NON REACTIVE

## 2023-06-13 PROCEDURE — 83735 ASSAY OF MAGNESIUM: CPT | Performed by: NURSE PRACTITIONER

## 2023-06-13 PROCEDURE — 71250 CT THORAX DX C-: CPT

## 2023-06-13 PROCEDURE — 25010000002 ENOXAPARIN PER 10 MG: Performed by: NURSE PRACTITIONER

## 2023-06-13 RX ORDER — POTASSIUM CHLORIDE 750 MG/1
20 TABLET, FILM COATED, EXTENDED RELEASE ORAL 2 TIMES DAILY WITH MEALS
Status: COMPLETED | OUTPATIENT
Start: 2023-06-13 | End: 2023-06-14

## 2023-06-13 RX ADMIN — ENOXAPARIN SODIUM 70 MG: 100 INJECTION SUBCUTANEOUS at 00:04

## 2023-06-13 RX ADMIN — Medication 10 ML: at 12:19

## 2023-06-13 RX ADMIN — ENOXAPARIN SODIUM 70 MG: 100 INJECTION SUBCUTANEOUS at 12:18

## 2023-06-13 RX ADMIN — LIDOCAINE 1 PATCH: 50 PATCH CUTANEOUS at 09:27

## 2023-06-13 RX ADMIN — ACETAMINOPHEN 650 MG: 325 TABLET, FILM COATED ORAL at 12:18

## 2023-06-13 RX ADMIN — METOPROLOL TARTRATE 12.5 MG: 25 TABLET, FILM COATED ORAL at 20:50

## 2023-06-13 RX ADMIN — POTASSIUM CHLORIDE 20 MEQ: 750 TABLET, EXTENDED RELEASE ORAL at 17:39

## 2023-06-13 RX ADMIN — AMLODIPINE BESYLATE 5 MG: 5 TABLET ORAL at 09:26

## 2023-06-13 RX ADMIN — Medication 10 ML: at 20:51

## 2023-06-13 RX ADMIN — ATORVASTATIN CALCIUM 10 MG: 20 TABLET, FILM COATED ORAL at 20:49

## 2023-06-13 RX ADMIN — VALSARTAN 80 MG: 80 TABLET, FILM COATED ORAL at 09:26

## 2023-06-13 NOTE — PROGRESS NOTES
"    Patient Name: Kamilla Hawthorne  :1945  77 y.o.      Patient Care Team:  Ishmael Medellin MD as PCP - General (Internal Medicine)  Ishmael Lozano Jr., MD (Dermatology)  Suhail Rothman MD as Consulting Physician (Gastroenterology)    Chief Complaint: follow up PAF    Interval History: currently in SR.        Objective   Vital Signs  Temp:  [98.1 °F (36.7 °C)-98.5 °F (36.9 °C)] 98.1 °F (36.7 °C)  Heart Rate:  [52-61] 52  Resp:  [18] 18  BP: (124-168)/(74-94) 168/88    Intake/Output Summary (Last 24 hours) at 2023 1737  Last data filed at 2023 0930  Gross per 24 hour   Intake 710 ml   Output --   Net 710 ml     Flowsheet Rows      Flowsheet Row First Filed Value   Admission Height 167.6 cm (66\") Documented at 2023 1030   Admission Weight 68.5 kg (151 lb) Documented at 2023 1030            Physical Exam:   General Appearance:    Alert, cooperative, in no acute distress   Lungs:     Clear to auscultation.  Normal respiratory effort and rate.      Heart:    Regular rhythm and normal rate, normal S1 and S2, no murmurs, gallops or rubs.     Chest Wall:    No abnormalities observed   Abdomen:     Soft, nontender, positive bowel sounds.     Extremities:   no cyanosis, clubbing or edema.  No marked joint deformities.  Adequate musculoskeletal strength.       Results Review:    Results from last 7 days   Lab Units 23  0612   SODIUM mmol/L 144   POTASSIUM mmol/L 3.3*   CHLORIDE mmol/L 110*   CO2 mmol/L 24.0   BUN mg/dL 8   CREATININE mg/dL 0.52*   GLUCOSE mg/dL 95   CALCIUM mg/dL 8.7     Results from last 7 days   Lab Units 23  1846 23  1640 23  1027   HSTROP T ng/L 41* 37* 33*     Results from last 7 days   Lab Units 23  0612   WBC 10*3/mm3 5.56   HEMOGLOBIN g/dL 14.1   HEMATOCRIT % 41.8   PLATELETS 10*3/mm3 189         Results from last 7 days   Lab Units 23  0349   MAGNESIUM mg/dL 1.8                   Medication Review:   amLODIPine, 5 mg, Oral, " Q24H  atorvastatin, 10 mg, Oral, Nightly  enoxaparin, 1 mg/kg, Subcutaneous, Q12H  latanoprost, 1 drop, Both Eyes, Nightly  lidocaine, 1 patch, Transdermal, Q24H  metoprolol tartrate, 25 mg, Oral, Q12H  potassium chloride, 20 mEq, Oral, BID With Meals  senna-docusate sodium, 2 tablet, Oral, BID  sodium chloride, 10 mL, Intravenous, Q12H  valsartan, 80 mg, Oral, Q24H              Assessment & Plan   Syncope - may need 2 week monitor to rule out conversion pause.   Right upper lob lung mass, bronch / biopsy planned for Thursday.   PAF (new) currently in SR. Beta blocker held this AM due to bradycardia. Will reduce dose and add hold parameters. She will need AC once invasive testing completed. Echocardiogram with normal LVEF, normal bi atrial size, no significant valve disease.   Hypertension  Elevated ddimer - PE ruled out.     BELLA Madrigal  [facility]  06/13/23  17:37 EDT  .

## 2023-06-13 NOTE — PROGRESS NOTES
[facility] INPATIENT PROGRESS NOTE         [address]    2023      PATIENT IDENTIFICATION:  Name: Kamilla Hawthorne ADMIT: 2023   : 1945  PCP: Ishmael Medellin MD    MRN: [id number] LOS: 1 days   AGE/SEX: 77 y.o. female  ROOM: [facility]                     LOS 1    Reason for visit: Lung mass      SUBJECTIVE:      No new complaints.  Discussed with her and her  in detail plan for robotic bronchoscopy including risks and benefits.  Questions answered to their satisfaction.    Objective   OBJECTIVE:    Vital Sign Min/Max for last 24 hours  Temp  Min: 98.1 °F (36.7 °C)  Max: 98.5 °F (36.9 °C)   BP  Min: 124/78  Max: 168/88   Pulse  Min: 52  Max: 61   Resp  Min: 18  Max: 18   SpO2  Min: 94 %  Max: 97 %   No data recorded   Weight  Min: 68.3 kg (150 lb 9.6 oz)  Max: 68.3 kg (150 lb 9.6 oz)                         Body mass index is 24.31 kg/m².    Intake/Output Summary (Last 24 hours) at 2023 1548  Last data filed at 2023 0930  Gross per 24 hour   Intake 710 ml   Output --   Net 710 ml         Exam:  GEN:  No distress, appears stated age  EYES:   PERRL, anicteric sclerae  ENT:    External ears/nose normal, OP clear  NECK:  No adenopathy, midline trachea  LUNGS: Normal chest on inspection, palpation and auscultation  CV:  Irregularly irregular, without murmur  ABD:  Nontender, nondistended, no hepatosplenomegaly, +BS  EXT:  No edema.  No cyanosis or clubbing.  No mottling and normal cap refill.    Assessment     Scheduled meds:  amLODIPine, 5 mg, Oral, Q24H  atorvastatin, 10 mg, Oral, Nightly  enoxaparin, 1 mg/kg, Subcutaneous, Q12H  latanoprost, 1 drop, Both Eyes, Nightly  lidocaine, 1 patch, Transdermal, Q24H  metoprolol tartrate, 25 mg, Oral, Q12H  potassium chloride, 20 mEq, Oral, BID With Meals  senna-docusate sodium, 2 tablet, Oral, BID  sodium chloride, 10 mL, Intravenous, Q12H  valsartan, 80 mg, Oral, Q24H      IV meds:                         Data Review:  Results  from last 7 days   Lab Units 06/12/23  0612 06/11/23  0430 06/09/23  1027   SODIUM mmol/L 144 146* 144   POTASSIUM mmol/L 3.3* 3.5 4.1   CHLORIDE mmol/L 110* 114* 111*   CO2 mmol/L 24.0 24.1 25.0   BUN mg/dL 8 8 14   CREATININE mg/dL 0.52* 0.53* 0.72   GLUCOSE mg/dL 95 97 114*   CALCIUM mg/dL 8.7 8.9 8.3*         Estimated Creatinine Clearance: 97.7 mL/min (A) (by C-G formula based on SCr of 0.52 mg/dL (L)).  Results from last 7 days   Lab Units 06/12/23  0612 06/11/23  0430 06/09/23  1027   WBC 10*3/mm3 5.56 5.08 7.65   HEMOGLOBIN g/dL 14.1 12.9 15.1   PLATELETS 10*3/mm3 189 176 198         Results from last 7 days   Lab Units 06/12/23  0612 06/11/23  0430 06/09/23  1027   ALT (SGPT) U/L 18 17 18   AST (SGOT) U/L 21 18 20                 No results found for: HGBA1C, POCGLU      CT chest shows 3.7 cm spiculated right upper lobe mass.                  Active Hospital Problems    Diagnosis  POA    **Syncope, unspecified syncope type [R55]  Yes    Lung mass [R91.8]  Unknown      Resolved Hospital Problems   No resolved problems to display.         ASSESSMENT:    Right upper lobe spiculated mass concerning for malignancy  History of tobacco use  Syncope: Likely secondary to atrial fibrillation        PLAN:    Plan for robotic bronchoscopy for tissue sampling of right upper lung mass..  Scheduled for Thursday.  Risks and benefits discussed with family and they expressed understanding and elected proceed.  We will hold her blood thinner after today and resume on Friday.      Tomas Booker MD  Pulmonary and Critical Care Medicine  [facility], [facility]  6/13/2023    15:48 EDT

## 2023-06-13 NOTE — PLAN OF CARE
Goal Outcome Evaluation:              Outcome Evaluation: Alert and orientedx4. Potassium repleated. Scheduled for broch with biopsy on thursday. Hold lovenox on wednesday. Otherwise up with assist with family to bathroom and chair throughout the day. Metoprolol held again today. HR 60's in NSR. Orthostatics negative.

## 2023-06-13 NOTE — PROGRESS NOTES
"   LOS: 1 day    Patient Care Team:  Ishmael Medellin MD as PCP - General (Internal Medicine)  Ishmael Lozano Jr., MD (Dermatology)  Suhail Rothman MD as Consulting Physician (Gastroenterology)    Chief Complaint:    Chief Complaint   Patient presents with   • Syncope     Subjective     Interval History:     Patient Complaints: Had about a 1 hour spell of atrial fibrillation yesterday with ventricular response in the 120s.  Resolved after a dose of Coreg.  She is pretty much asymptomatic at the present time.  Was really having no symptoms during the spell of atrial fibrillation yesterday.      Objective     Vital Signs  Temp:  [98.1 °F (36.7 °C)-98.5 °F (36.9 °C)] 98.1 °F (36.7 °C)  Heart Rate:  [52-61] 52  Resp:  [18] 18  BP: (124-168)/(74-94) 168/88    Flowsheet Rows    Flowsheet Row First Filed Value   Admission Height 167.6 cm (66\") Documented at 06/09/2023 1030   Admission Weight 68.5 kg (151 lb) Documented at 06/09/2023 1030          I/O this shift:  In: 710 [P.O.:710]  Out: -   No intake/output data recorded.    Intake/Output Summary (Last 24 hours) at 6/13/2023 1232  Last data filed at 6/13/2023 0930  Gross per 24 hour   Intake 710 ml   Output --   Net 710 ml       Physical Exam:   General Appearance:    Alert, pleasantly confused and cooperative, in no acute distress       Eyes:            Conjunctivae and sclerae normal, no   icterus, PERRLA   Neck:          Lungs:     Clear to auscultation,respirations regular, even and                  unlabored    Heart:   Irregularly irregular and slightly tachycardic normal S1 and S2, no murmur, no gallop, no rub, no click       Abdomen:            Extremities:    Pulses:        Lymph nodes:          Results Review:    I reviewed the patient's new clinical results.  I reviewed the patient's new imaging results and agree with the interpretation.  I personally viewed and interpreted the patient's EKG/Telemetry data  Results from last 7 days   Lab Units 06/12/23  0612 " 06/11/23  0430 06/09/23  1027   SODIUM mmol/L 144 146* 144   POTASSIUM mmol/L 3.3* 3.5 4.1   CHLORIDE mmol/L 110* 114* 111*   CO2 mmol/L 24.0 24.1 25.0   BUN mg/dL 8 8 14   CREATININE mg/dL 0.52* 0.53* 0.72   CALCIUM mg/dL 8.7 8.9 8.3*   BILIRUBIN mg/dL 1.3* 1.3* 1.4*   ALK PHOS U/L 80 74 81   ALT (SGPT) U/L 18 17 18   AST (SGOT) U/L 21 18 20   GLUCOSE mg/dL 95 97 114*       Estimated Creatinine Clearance: 97.7 mL/min (A) (by C-G formula based on SCr of 0.52 mg/dL (L)).    Results from last 7 days   Lab Units 06/13/23  0349 06/12/23  0826 06/09/23  1027   MAGNESIUM mg/dL 1.8 1.9 1.8             Results from last 7 days   Lab Units 06/12/23  0612 06/11/23  0430 06/09/23  1027   WBC 10*3/mm3 5.56 5.08 7.65   HEMOGLOBIN g/dL 14.1 12.9 15.1   PLATELETS 10*3/mm3 189 176 198               Imaging Results (Last 24 Hours)     ** No results found for the last 24 hours. **        amLODIPine, 5 mg, Oral, Q24H  atorvastatin, 10 mg, Oral, Nightly  enoxaparin, 1 mg/kg, Subcutaneous, Q12H  latanoprost, 1 drop, Both Eyes, Nightly  lidocaine, 1 patch, Transdermal, Q24H  metoprolol tartrate, 25 mg, Oral, Q12H  senna-docusate sodium, 2 tablet, Oral, BID  sodium chloride, 10 mL, Intravenous, Q12H  valsartan, 80 mg, Oral, Q24H           Medication Review:   Current Facility-Administered Medications   Medication Dose Route Frequency Provider Last Rate Last Admin   • acetaminophen (TYLENOL) tablet 650 mg  650 mg Oral Q6H PRN Clarissa Shaw MD   650 mg at 06/13/23 1218   • amLODIPine (NORVASC) tablet 5 mg  5 mg Oral Q24H Clarissa Shaw MD   5 mg at 06/13/23 0926   • atorvastatin (LIPITOR) tablet 10 mg  10 mg Oral Nightly Ishmael Medellin MD   10 mg at 06/12/23 2026   • sennosides-docusate (PERICOLACE) 8.6-50 MG per tablet 2 tablet  2 tablet Oral BID Ishmael Medellin MD        And   • polyethylene glycol (MIRALAX) packet 17 g  17 g Oral Daily PRN Ishmael Medellin MD        And   • bisacodyl (DULCOLAX) EC tablet 5 mg  5 mg Oral Daily PRN  Ishmael Medellin MD        And   • bisacodyl (DULCOLAX) suppository 10 mg  10 mg Rectal Daily PRN Ishmael Medellin MD       • calcium carbonate (TUMS) chewable tablet 500 mg (200 mg elemental)  2 tablet Oral TID PRN Ishmael Medellin MD       • Calcium Replacement - Follow Nurse / BPA Driven Protocol   Does not apply PRN Sneha Reed APRN       • Enoxaparin Sodium (LOVENOX) syringe 70 mg  1 mg/kg Subcutaneous Q12H Sneha Reed APRN   70 mg at 06/13/23 1218   • latanoprost (XALATAN) 0.005 % ophthalmic solution 1 drop  1 drop Both Eyes Nightly Ishmael Medellin MD       • lidocaine (LIDODERM) 5 % 1 patch  1 patch Transdermal Q24H Clarissa Shaw MD   1 patch at 06/13/23 0927   • Magnesium Standard Dose Replacement - Follow Nurse / BPA Driven Protocol   Does not apply PRN Sneha Reed APRN       • metoprolol tartrate (LOPRESSOR) injection 5 mg  5 mg Intravenous Q5 Min PRN Sneha Reed APRN   5 mg at 06/12/23 0831   • metoprolol tartrate (LOPRESSOR) tablet 25 mg  25 mg Oral Q12H Sneha Reed APRN   25 mg at 06/12/23 0831   • nitroglycerin (NITROSTAT) SL tablet 0.4 mg  0.4 mg Sublingual Q5 Min PRN Ishmael Medellin MD       • Phosphorus Replacement - Follow Nurse / BPA Driven Protocol   Does not apply PRN Sneha Reed APRN       • Potassium Replacement - Follow Nurse / BPA Driven Protocol   Does not apply PRN Sneha Reed APRN       • sodium chloride 0.9 % flush 10 mL  10 mL Intravenous PRN Deshaun Pearl MD   10 mL at 06/09/23 1027   • sodium chloride 0.9 % flush 10 mL  10 mL Intravenous Q12H Ishmael Medellin MD   10 mL at 06/13/23 1219   • sodium chloride 0.9 % flush 10 mL  10 mL Intravenous PRN Ishmael Medellin MD       • sodium chloride 0.9 % infusion 40 mL  40 mL Intravenous PRN Ishmael Medellin MD       • valsartan (DIOVAN) tablet 80 mg  80 mg Oral Q24H Ishmael Medellin MD   80 mg at 06/13/23 0959       Assessment & Plan       Syncope, unspecified syncope type    Lung mass  New  onset atrial fibrillation this morning with RVR.  Sinus bradycardia through the weekend.  That raises the issue of sick sinus syndrome and that may well be the cause of her syncope, intermittent bradycardia arrhythmias  Incidental right upper lobe mass concerning for malignancy  Likely recent onset dementia  Hypertension  Distant history of malignant melanoma    It is beginning look like sick sinus syndrome may be the cause of her syncope, i.e. bradycardia arrhythmias.  She has an underlying sinus bradycardia and now atrial fibrillation with RVR this morning.  Beta-blockers have been added.  7-day Holter monitor planned at discharge.  The question is whether she is going to require a pacemaker.  Right now we have not proven a bradycardia arrhythmia but suspect it.  For now will await the 7-day Zio patch at discharge.  I see no need for a stress test at this point in time    Discussed lung biopsy with Dr. Booker yesterday.  He feels a bronchoscopic approach would be best to help avoid the pneumothorax.  At the present time that has been scheduled for Thursday.  We will need to stop her Lovenox 24 hours prior to the procedure.  That would mean last dose would be tonight.  She is now on full dose Lovenox to provide stroke protection from her atrial fibrillation.      Ishmael Medellin MD  06/13/23  12:32 EDT

## 2023-06-14 LAB
MAGNESIUM SERPL-MCNC: 2 MG/DL (ref 1.6–2.4)
POTASSIUM SERPL-SCNC: 4.3 MMOL/L (ref 3.5–5.2)

## 2023-06-14 PROCEDURE — 83735 ASSAY OF MAGNESIUM: CPT | Performed by: NURSE PRACTITIONER

## 2023-06-14 PROCEDURE — 84132 ASSAY OF SERUM POTASSIUM: CPT | Performed by: INTERNAL MEDICINE

## 2023-06-14 RX ADMIN — LATANOPROST 1 DROP: 50 SOLUTION/ DROPS OPHTHALMIC at 20:23

## 2023-06-14 RX ADMIN — Medication 10 ML: at 20:24

## 2023-06-14 RX ADMIN — AMLODIPINE BESYLATE 5 MG: 5 TABLET ORAL at 08:50

## 2023-06-14 RX ADMIN — VALSARTAN 80 MG: 80 TABLET, FILM COATED ORAL at 08:50

## 2023-06-14 RX ADMIN — METOPROLOL TARTRATE 12.5 MG: 25 TABLET, FILM COATED ORAL at 08:50

## 2023-06-14 RX ADMIN — METOPROLOL TARTRATE 12.5 MG: 25 TABLET, FILM COATED ORAL at 20:21

## 2023-06-14 RX ADMIN — ATORVASTATIN CALCIUM 10 MG: 20 TABLET, FILM COATED ORAL at 20:21

## 2023-06-14 RX ADMIN — POTASSIUM CHLORIDE 20 MEQ: 750 TABLET, EXTENDED RELEASE ORAL at 08:50

## 2023-06-14 RX ADMIN — Medication 10 ML: at 08:52

## 2023-06-14 RX ADMIN — ACETAMINOPHEN 650 MG: 325 TABLET, FILM COATED ORAL at 20:20

## 2023-06-14 NOTE — PLAN OF CARE
Goal Outcome Evaluation:  Plan of Care Reviewed With: patient        Progress: no change  Outcome Evaluation: Pt is A&Ox4. Up with stand by assist. Bp was slightly elevated in am with blood pressure medications given. No c/o pain . Orthostatics were negative. Patient is to be NPO at midnight for procedure tomorrow.

## 2023-06-14 NOTE — PROGRESS NOTES
"   LOS: 2 days    Patient Care Team:  Ishmael Medellin MD as PCP - General (Internal Medicine)  Ishmael Lozano Jr., MD (Dermatology)  Suhail Rothman MD as Consulting Physician (Gastroenterology)    Chief Complaint:    Chief Complaint   Patient presents with   • Syncope     Subjective     Interval History:     Patient Complaints: No complaints today.  She is a bit sheepish about thinking that her bronchoscopy was going to be yesterday rather than tomorrow when we talked about that yesterday.  She is pretty much asymptomatic at the present time.  No further arrhythmias noted.    Objective     Vital Signs  Temp:  [97.9 °F (36.6 °C)-98.3 °F (36.8 °C)] 97.9 °F (36.6 °C)  Heart Rate:  [56-69] 69  Resp:  [18] 18  BP: (133-170)/(64-97) 139/89    Flowsheet Rows    Flowsheet Row First Filed Value   Admission Height 167.6 cm (66\") Documented at 06/09/2023 1030   Admission Weight 68.5 kg (151 lb) Documented at 06/09/2023 1030          I/O this shift:  In: 720 [P.O.:720]  Out: -   I/O last 3 completed shifts:  In: 710 [P.O.:710]  Out: -     Intake/Output Summary (Last 24 hours) at 6/14/2023 1336  Last data filed at 6/14/2023 0815  Gross per 24 hour   Intake 720 ml   Output --   Net 720 ml       Physical Exam:   General Appearance:    Alert, pleasantly confused and cooperative, in no acute distress       Eyes:            Conjunctivae and sclerae normal, no   icterus, PERRLA   Neck:          Lungs:     Clear to auscultation,respirations regular, even and                  unlabored    Heart:   Irregularly irregular and slightly tachycardic normal S1 and S2, no murmur, no gallop, no rub, no click       Abdomen:            Extremities:    Pulses:        Lymph nodes:          Results Review:    I reviewed the patient's new clinical results.  I reviewed the patient's new imaging results and agree with the interpretation.  I personally viewed and interpreted the patient's EKG/Telemetry data  Results from last 7 days   Lab Units " 06/14/23  0429 06/12/23  0612 06/11/23  0430 06/09/23  1027   SODIUM mmol/L  --  144 146* 144   POTASSIUM mmol/L 4.3 3.3* 3.5 4.1   CHLORIDE mmol/L  --  110* 114* 111*   CO2 mmol/L  --  24.0 24.1 25.0   BUN mg/dL  --  8 8 14   CREATININE mg/dL  --  0.52* 0.53* 0.72   CALCIUM mg/dL  --  8.7 8.9 8.3*   BILIRUBIN mg/dL  --  1.3* 1.3* 1.4*   ALK PHOS U/L  --  80 74 81   ALT (SGPT) U/L  --  18 17 18   AST (SGOT) U/L  --  21 18 20   GLUCOSE mg/dL  --  95 97 114*       Estimated Creatinine Clearance: 98.1 mL/min (A) (by C-G formula based on SCr of 0.52 mg/dL (L)).    Results from last 7 days   Lab Units 06/14/23  0429 06/13/23  0349 06/12/23  0826   MAGNESIUM mg/dL 2.0 1.8 1.9             Results from last 7 days   Lab Units 06/12/23  0612 06/11/23  0430 06/09/23  1027   WBC 10*3/mm3 5.56 5.08 7.65   HEMOGLOBIN g/dL 14.1 12.9 15.1   PLATELETS 10*3/mm3 189 176 198               Imaging Results (Last 24 Hours)     Procedure Component Value Units Date/Time    CT Chest Without Contrast Diagnostic [[id number]] Collected: 06/13/23 1447     Updated: 06/13/23 1455    Narrative:      CT CHEST WO CONTRAST DIAGNOSTIC-     Radiation dose reduction techniques were utilized, including automated  exposure control and exposure modulation based on body size.     Clinical: ION robotic protocol CT chest for use with additional  bronchoscopy     Axial imaging performed using 3 mm as well as 1 mm cuts with coronal and  sagittal reconstruction.     COMPARISON 06/11/2023 CT     FINDINGS: There is again demonstrated the right upper lobe 3.7 cm  spiculated nodular density similar to the previous examination. No new  pulmonary parenchymal finding has developed. Tracheobronchial tree is  satisfactory in appearance. Mediastinum stable. Trace right pleural  effusion.     CT utilized for navigational bronchoscopy.        This report was finalized on 6/13/2023 2:52 PM by Dr. Stanford De Oliveira M.D.           amLODIPine, 5 mg, Oral, Q24H  atorvastatin, 10  mg, Oral, Nightly  latanoprost, 1 drop, Both Eyes, Nightly  lidocaine, 1 patch, Transdermal, Q24H  metoprolol tartrate, 12.5 mg, Oral, Q12H  senna-docusate sodium, 2 tablet, Oral, BID  sodium chloride, 10 mL, Intravenous, Q12H  valsartan, 80 mg, Oral, Q24H           Medication Review:   Current Facility-Administered Medications   Medication Dose Route Frequency Provider Last Rate Last Admin   • acetaminophen (TYLENOL) tablet 650 mg  650 mg Oral Q6H PRN Clarissa Shaw MD   650 mg at 06/13/23 1218   • amLODIPine (NORVASC) tablet 5 mg  5 mg Oral Q24H Clarissa Shaw MD   5 mg at 06/14/23 0850   • atorvastatin (LIPITOR) tablet 10 mg  10 mg Oral Nightly Ishmael Medellin MD   10 mg at 06/13/23 2049   • sennosides-docusate (PERICOLACE) 8.6-50 MG per tablet 2 tablet  2 tablet Oral BID Ishmael Medellin MD        And   • polyethylene glycol (MIRALAX) packet 17 g  17 g Oral Daily PRN Ishmael Medellin MD        And   • bisacodyl (DULCOLAX) EC tablet 5 mg  5 mg Oral Daily PRN Ishmael Medellin MD        And   • bisacodyl (DULCOLAX) suppository 10 mg  10 mg Rectal Daily PRN Ishmael Medellin MD       • calcium carbonate (TUMS) chewable tablet 500 mg (200 mg elemental)  2 tablet Oral TID PRN Ishmael Medellin MD       • Calcium Replacement - Follow Nurse / BPA Driven Protocol   Does not apply PRN Sneha Reed APRN       • latanoprost (XALATAN) 0.005 % ophthalmic solution 1 drop  1 drop Both Eyes Nightly Ishmael Medellin MD       • lidocaine (LIDODERM) 5 % 1 patch  1 patch Transdermal Q24H Clarissa Shaw MD   1 patch at 06/13/23 0927   • Magnesium Standard Dose Replacement - Follow Nurse / BPA Driven Protocol   Does not apply PRN Sneha Reed APRN       • metoprolol tartrate (LOPRESSOR) injection 5 mg  5 mg Intravenous Q5 Min PRN Sneha Reed APRN   5 mg at 06/12/23 0831   • metoprolol tartrate (LOPRESSOR) tablet 12.5 mg  12.5 mg Oral Q12H Ishmael Medellin MD   12.5 mg at 06/14/23 0850   • nitroglycerin (NITROSTAT)  SL tablet 0.4 mg  0.4 mg Sublingual Q5 Min PRN Ishmael Medellin MD       • Phosphorus Replacement - Follow Nurse / BPA Driven Protocol   Does not apply PRN Sneha Reed APRN       • Potassium Replacement - Follow Nurse / BPA Driven Protocol   Does not apply PRN Sneha Reed APRN       • sodium chloride 0.9 % flush 10 mL  10 mL Intravenous PRN Deshaun Pearl MD   10 mL at 06/09/23 1027   • sodium chloride 0.9 % flush 10 mL  10 mL Intravenous Q12H Ishmael Medellin MD   10 mL at 06/14/23 0852   • sodium chloride 0.9 % flush 10 mL  10 mL Intravenous PRN Ishmael Medellin MD       • sodium chloride 0.9 % infusion 40 mL  40 mL Intravenous PRN Ishmael Medellin MD       • valsartan (DIOVAN) tablet 80 mg  80 mg Oral Q24H Ishmael Medellin MD   80 mg at 06/14/23 0850       Assessment & Plan       Syncope, unspecified syncope type    Lung mass  New onset atrial fibrillation with RVR.  Likely related to sick sinus syndrome.  Incidental right upper lobe mass concerning for malignancy  Likely recent onset dementia  Hypertension  Distant history of malignant melanoma    It is beginning look like sick sinus syndrome may be the cause of her syncope, i.e. bradycardia arrhythmias.  She has an underlying sinus bradycardia and now atrial fibrillation with RVR.  Beta-blockers have been added.  Back in normal sinus rhythm.  7-day Holter monitor planned at discharge.  The question is whether she is going to require a pacemaker.  Right now we have not proven a bradycardia arrhythmia but suspect it.  For now will await the 7-day Zio patch at discharge.      She is on for a navigational bronchoscopy with biopsy tomorrow.  We will need to stop her Lovenox 24 hours prior to the procedure and I already stopped it this morning.  Presumably she will be discharged sometime after the bronchoscopy or Friday morning on Eliquis which will begin on Friday morning.      Ishmael Medellin MD  06/14/23  13:36 EDT

## 2023-06-14 NOTE — PROGRESS NOTES
[facility] INPATIENT PROGRESS NOTE         [address]    2023      PATIENT IDENTIFICATION:  Name: Kamilla Hawthorne ADMIT: 2023   : 1945  PCP: Ishmael Medellin MD    MRN: [id number] LOS: 2 days   AGE/SEX: 77 y.o. female  ROOM: RUST                     LOS 2    Reason for visit: Lung mass      SUBJECTIVE:      No new issues.     Objective   OBJECTIVE:    Vital Sign Min/Max for last 24 hours  Temp  Min: 98 °F (36.7 °C)  Max: 98.3 °F (36.8 °C)   BP  Min: 124/78  Max: 168/88   Pulse  Min: 56  Max: 69   Resp  Min: 18  Max: 18   SpO2  Min: 94 %  Max: 96 %   No data recorded   No data recorded                         Body mass index is 24.31 kg/m².    Intake/Output Summary (Last 24 hours) at 2023 0717  Last data filed at 2023 0930  Gross per 24 hour   Intake 710 ml   Output --   Net 710 ml         Exam:  GEN:  No distress, appears stated age  NECK:  No adenopathy, midline trachea  LUNGS: Non labored    Assessment     Scheduled meds:  amLODIPine, 5 mg, Oral, Q24H  atorvastatin, 10 mg, Oral, Nightly  latanoprost, 1 drop, Both Eyes, Nightly  lidocaine, 1 patch, Transdermal, Q24H  metoprolol tartrate, 12.5 mg, Oral, Q12H  potassium chloride, 20 mEq, Oral, BID With Meals  senna-docusate sodium, 2 tablet, Oral, BID  sodium chloride, 10 mL, Intravenous, Q12H  valsartan, 80 mg, Oral, Q24H      IV meds:                         Data Review:  Results from last 7 days   Lab Units 23  0429 23  0612 23  0430 23  1027   SODIUM mmol/L  --  144 146* 144   POTASSIUM mmol/L 4.3 3.3* 3.5 4.1   CHLORIDE mmol/L  --  110* 114* 111*   CO2 mmol/L  --  24.0 24.1 25.0   BUN mg/dL  --  8 8 14   CREATININE mg/dL  --  0.52* 0.53* 0.72   GLUCOSE mg/dL  --  95 97 114*   CALCIUM mg/dL  --  8.7 8.9 8.3*         Estimated Creatinine Clearance: 97.7 mL/min (A) (by C-G formula based on SCr of 0.52 mg/dL (L)).  Results from last 7 days   Lab Units 23  0612 23  0430 23  1029    WBC 10*3/mm3 5.56 5.08 7.65   HEMOGLOBIN g/dL 14.1 12.9 15.1   PLATELETS 10*3/mm3 189 176 198         Results from last 7 days   Lab Units 06/12/23  0612 06/11/23  0430 06/09/23  1027   ALT (SGPT) U/L 18 17 18   AST (SGOT) U/L 21 18 20                 No results found for: HGBA1C, POCGLU      CT chest shows 3.7 cm spiculated right upper lobe mass.                  Active Hospital Problems    Diagnosis  POA    **Syncope, unspecified syncope type [R55]  Yes    Lung mass [R91.8]  Unknown      Resolved Hospital Problems   No resolved problems to display.         ASSESSMENT:    Right upper lobe spiculated mass concerning for malignancy  History of tobacco use  Syncope: Likely secondary to atrial fibrillation        PLAN:    Plan for robotic bronchoscopy for tissue sampling of right upper lung mass..  Scheduled for tomorrow.  We will hold her blood thinner and resume on Friday.  NPO after midnight.       Tomas Booker MD  Pulmonary and Critical Care Medicine  [facility], [facility]  6/14/2023    07:17 EDT

## 2023-06-14 NOTE — CASE MANAGEMENT/SOCIAL WORK
Continued Stay Note  [facility]     Patient Name: Kamilla Hawthorne  MRN: [id number]  Today's Date: 6/14/2023    Admit Date: 6/9/2023    Plan: Home with spouse   Discharge Plan       Row Name 06/14/23 1344       Plan    Plan Home with spouse    Plan Comments S/W pt and her spouse Estuardo at bedside who confirms plan to return home upon DC.  She is ambulating without difficulty and does not anticipate any DC needs at this time.  CCP will continue to follow. .....Deidra IZQUIERDO/ CCP                   Discharge Codes    No documentation.                 Expected Discharge Date and Time       Expected Discharge Date Expected Discharge Time    Jun 14, 2023               Deidra Castañeda RN

## 2023-06-14 NOTE — PROGRESS NOTES
"    Patient Name: Kamilla Hawthorne  :1945  77 y.o.      Patient Care Team:  Ishmael Medellin MD as PCP - General (Internal Medicine)  Ishmael Lozano Jr., MD (Dermatology)  Suhail Rothman MD as Consulting Physician (Gastroenterology)    Chief Complaint: follow up PAF    Interval History: remains in SR> tele reviewed. No AF.  Has received two doses of metoprolol and tolerating well. BP was elevated.        Objective   Vital Signs  Temp:  [97.9 °F (36.6 °C)-98.3 °F (36.8 °C)] 97.9 °F (36.6 °C)  Heart Rate:  [56-69] 69  Resp:  [18] 18  BP: (124-170)/(64-97) 139/89    Intake/Output Summary (Last 24 hours) at 2023 1210  Last data filed at 2023 0815  Gross per 24 hour   Intake 720 ml   Output --   Net 720 ml     Flowsheet Rows      Flowsheet Row First Filed Value   Admission Height 167.6 cm (66\") Documented at 2023 1030   Admission Weight 68.5 kg (151 lb) Documented at 2023 1030            Physical Exam:   General Appearance:    Alert, cooperative, in no acute distress   Lungs:     Clear to auscultation.  Normal respiratory effort and rate.      Heart:    Regular rhythm and normal rate, normal S1 and S2, no murmurs, gallops or rubs.     Chest Wall:    No abnormalities observed   Abdomen:     Soft, nontender, positive bowel sounds.     Extremities:   no cyanosis, clubbing or edema.  No marked joint deformities.  Adequate musculoskeletal strength.       Results Review:    Results from last 7 days   Lab Units 23  0429 23  0612   SODIUM mmol/L  --  144   POTASSIUM mmol/L 4.3 3.3*   CHLORIDE mmol/L  --  110*   CO2 mmol/L  --  24.0   BUN mg/dL  --  8   CREATININE mg/dL  --  0.52*   GLUCOSE mg/dL  --  95   CALCIUM mg/dL  --  8.7     Results from last 7 days   Lab Units 23  1846 23  1640 23  1027   HSTROP T ng/L 41* 37* 33*     Results from last 7 days   Lab Units 23  0612   WBC 10*3/mm3 5.56   HEMOGLOBIN g/dL 14.1   HEMATOCRIT % 41.8   PLATELETS 10*3/mm3 189       "   Results from last 7 days   Lab Units 06/14/23  0429   MAGNESIUM mg/dL 2.0                   Medication Review:   amLODIPine, 5 mg, Oral, Q24H  atorvastatin, 10 mg, Oral, Nightly  latanoprost, 1 drop, Both Eyes, Nightly  lidocaine, 1 patch, Transdermal, Q24H  metoprolol tartrate, 12.5 mg, Oral, Q12H  senna-docusate sodium, 2 tablet, Oral, BID  sodium chloride, 10 mL, Intravenous, Q12H  valsartan, 80 mg, Oral, Q24H              Assessment & Plan   Syncope - may need 2 week monitor to rule out conversion pause.   Right upper lob lung mass, bronch / biopsy planned for tomorrow. Lovenox held.   PAF (new) currently in SR. Beta blocker held this yesterday due to bradycardia. Dose reduced and tolerating well. She will need AC once invasive testing completed. Echocardiogram with normal LVEF, normal bi atrial size, no significant valve disease. CHADs 2 Vasc score 4.  Hypertension  Elevated ddimer - PE ruled out.     Bronchoscopy tomorrow. Will see after.     BELLA Madrigal  [facility]  06/14/23  12:10 EDT  .

## 2023-06-15 ENCOUNTER — ANESTHESIA (OUTPATIENT)
Dept: GASTROENTEROLOGY | Facility: HOSPITAL | Age: 78
End: 2023-06-15
Payer: MEDICARE

## 2023-06-15 ENCOUNTER — ANESTHESIA EVENT (OUTPATIENT)
Dept: GASTROENTEROLOGY | Facility: HOSPITAL | Age: 78
End: 2023-06-15
Payer: MEDICARE

## 2023-06-15 ENCOUNTER — APPOINTMENT (OUTPATIENT)
Dept: GENERAL RADIOLOGY | Facility: HOSPITAL | Age: 78
DRG: 264 | End: 2023-06-15
Payer: MEDICARE

## 2023-06-15 ENCOUNTER — APPOINTMENT (OUTPATIENT)
Dept: CARDIOLOGY | Facility: HOSPITAL | Age: 78
DRG: 264 | End: 2023-06-15
Payer: MEDICARE

## 2023-06-15 ENCOUNTER — READMISSION MANAGEMENT (OUTPATIENT)
Dept: CALL CENTER | Facility: HOSPITAL | Age: 78
End: 2023-06-15
Payer: MEDICARE

## 2023-06-15 VITALS
WEIGHT: 151 LBS | SYSTOLIC BLOOD PRESSURE: 154 MMHG | BODY MASS INDEX: 24.27 KG/M2 | DIASTOLIC BLOOD PRESSURE: 74 MMHG | HEART RATE: 62 BPM | TEMPERATURE: 97.4 F | RESPIRATION RATE: 16 BRPM | OXYGEN SATURATION: 93 % | HEIGHT: 66 IN

## 2023-06-15 LAB
B PARAPERT DNA SPEC QL NAA+PROBE: NOT DETECTED
B PERT DNA SPEC QL NAA+PROBE: NOT DETECTED
C PNEUM DNA NPH QL NAA+NON-PROBE: NOT DETECTED
FLUAV SUBTYP SPEC NAA+PROBE: NOT DETECTED
FLUBV RNA ISLT QL NAA+PROBE: NOT DETECTED
GIE STN SPEC: NORMAL
HADV DNA SPEC NAA+PROBE: NOT DETECTED
HCOV 229E RNA SPEC QL NAA+PROBE: NOT DETECTED
HCOV HKU1 RNA SPEC QL NAA+PROBE: NOT DETECTED
HCOV NL63 RNA SPEC QL NAA+PROBE: NOT DETECTED
HCOV OC43 RNA SPEC QL NAA+PROBE: NOT DETECTED
HMPV RNA NPH QL NAA+NON-PROBE: NOT DETECTED
HPIV1 RNA ISLT QL NAA+PROBE: NOT DETECTED
HPIV2 RNA SPEC QL NAA+PROBE: NOT DETECTED
HPIV3 RNA NPH QL NAA+PROBE: NOT DETECTED
HPIV4 P GENE NPH QL NAA+PROBE: NOT DETECTED
M PNEUMO IGG SER IA-ACNC: NOT DETECTED
RHINOVIRUS RNA SPEC NAA+PROBE: NOT DETECTED
RSV RNA NPH QL NAA+NON-PROBE: NOT DETECTED
SARS-COV-2 RNA NPH QL NAA+NON-PROBE: NOT DETECTED

## 2023-06-15 PROCEDURE — 87205 SMEAR GRAM STAIN: CPT | Performed by: INTERNAL MEDICINE

## 2023-06-15 PROCEDURE — 87071 CULTURE AEROBIC QUANT OTHER: CPT | Performed by: INTERNAL MEDICINE

## 2023-06-15 PROCEDURE — 71045 X-RAY EXAM CHEST 1 VIEW: CPT

## 2023-06-15 PROCEDURE — 87116 MYCOBACTERIA CULTURE: CPT | Performed by: INTERNAL MEDICINE

## 2023-06-15 PROCEDURE — 0 LIDOCAINE 1 % SOLUTION: Performed by: NURSE ANESTHETIST, CERTIFIED REGISTERED

## 2023-06-15 PROCEDURE — 87206 SMEAR FLUORESCENT/ACID STAI: CPT | Performed by: INTERNAL MEDICINE

## 2023-06-15 PROCEDURE — 0202U NFCT DS 22 TRGT SARS-COV-2: CPT | Performed by: INTERNAL MEDICINE

## 2023-06-15 PROCEDURE — 87102 FUNGUS ISOLATION CULTURE: CPT | Performed by: INTERNAL MEDICINE

## 2023-06-15 PROCEDURE — 76000 FLUOROSCOPY <1 HR PHYS/QHP: CPT

## 2023-06-15 PROCEDURE — 93246 EXT ECG>7D<15D RECORDING: CPT

## 2023-06-15 PROCEDURE — 25010000002 SUGAMMADEX 200 MG/2ML SOLUTION: Performed by: NURSE ANESTHETIST, CERTIFIED REGISTERED

## 2023-06-15 PROCEDURE — 25010000002 PROPOFOL 10 MG/ML EMULSION: Performed by: NURSE ANESTHETIST, CERTIFIED REGISTERED

## 2023-06-15 RX ORDER — PROMETHAZINE HYDROCHLORIDE 25 MG/1
25 SUPPOSITORY RECTAL ONCE AS NEEDED
Status: DISCONTINUED | OUTPATIENT
Start: 2023-06-15 | End: 2023-06-15 | Stop reason: HOSPADM

## 2023-06-15 RX ORDER — AMLODIPINE BESYLATE 5 MG/1
5 TABLET ORAL
Qty: 30 TABLET | Refills: 0 | Status: SHIPPED | OUTPATIENT
Start: 2023-06-16

## 2023-06-15 RX ORDER — HYDRALAZINE HYDROCHLORIDE 20 MG/ML
5 INJECTION INTRAMUSCULAR; INTRAVENOUS
Status: DISCONTINUED | OUTPATIENT
Start: 2023-06-15 | End: 2023-06-15 | Stop reason: HOSPADM

## 2023-06-15 RX ORDER — GLYCOPYRROLATE 0.2 MG/ML
INJECTION INTRAMUSCULAR; INTRAVENOUS AS NEEDED
Status: DISCONTINUED | OUTPATIENT
Start: 2023-06-15 | End: 2023-06-15 | Stop reason: SURG

## 2023-06-15 RX ORDER — FLUMAZENIL 0.1 MG/ML
0.2 INJECTION INTRAVENOUS AS NEEDED
Status: DISCONTINUED | OUTPATIENT
Start: 2023-06-15 | End: 2023-06-15 | Stop reason: HOSPADM

## 2023-06-15 RX ORDER — SODIUM CHLORIDE 9 MG/ML
30 INJECTION, SOLUTION INTRAVENOUS CONTINUOUS PRN
Status: DISCONTINUED | OUTPATIENT
Start: 2023-06-15 | End: 2023-06-15 | Stop reason: HOSPADM

## 2023-06-15 RX ORDER — PROMETHAZINE HYDROCHLORIDE 25 MG/1
25 TABLET ORAL ONCE AS NEEDED
Status: DISCONTINUED | OUTPATIENT
Start: 2023-06-15 | End: 2023-06-15 | Stop reason: HOSPADM

## 2023-06-15 RX ORDER — EPHEDRINE SULFATE 50 MG/ML
5 INJECTION, SOLUTION INTRAVENOUS ONCE AS NEEDED
Status: DISCONTINUED | OUTPATIENT
Start: 2023-06-15 | End: 2023-06-15 | Stop reason: HOSPADM

## 2023-06-15 RX ORDER — VALSARTAN 80 MG/1
80 TABLET ORAL
Qty: 30 TABLET | Refills: 0 | Status: SHIPPED | OUTPATIENT
Start: 2023-06-16

## 2023-06-15 RX ORDER — FENTANYL CITRATE 50 UG/ML
50 INJECTION, SOLUTION INTRAMUSCULAR; INTRAVENOUS
Status: DISCONTINUED | OUTPATIENT
Start: 2023-06-15 | End: 2023-06-15 | Stop reason: HOSPADM

## 2023-06-15 RX ORDER — HYDROMORPHONE HCL 110MG/55ML
0.5 PATIENT CONTROLLED ANALGESIA SYRINGE INTRAVENOUS
Status: DISCONTINUED | OUTPATIENT
Start: 2023-06-15 | End: 2023-06-15 | Stop reason: HOSPADM

## 2023-06-15 RX ORDER — LABETALOL HYDROCHLORIDE 5 MG/ML
5 INJECTION, SOLUTION INTRAVENOUS
Status: DISCONTINUED | OUTPATIENT
Start: 2023-06-15 | End: 2023-06-15 | Stop reason: HOSPADM

## 2023-06-15 RX ORDER — DROPERIDOL 2.5 MG/ML
0.62 INJECTION, SOLUTION INTRAMUSCULAR; INTRAVENOUS
Status: DISCONTINUED | OUTPATIENT
Start: 2023-06-15 | End: 2023-06-15 | Stop reason: HOSPADM

## 2023-06-15 RX ORDER — HYDROCODONE BITARTRATE AND ACETAMINOPHEN 7.5; 325 MG/1; MG/1
1 TABLET ORAL ONCE AS NEEDED
Status: DISCONTINUED | OUTPATIENT
Start: 2023-06-15 | End: 2023-06-15 | Stop reason: HOSPADM

## 2023-06-15 RX ORDER — OXYCODONE AND ACETAMINOPHEN 7.5; 325 MG/1; MG/1
1 TABLET ORAL EVERY 4 HOURS PRN
Status: DISCONTINUED | OUTPATIENT
Start: 2023-06-15 | End: 2023-06-15 | Stop reason: HOSPADM

## 2023-06-15 RX ORDER — ONDANSETRON 2 MG/ML
4 INJECTION INTRAMUSCULAR; INTRAVENOUS ONCE AS NEEDED
Status: DISCONTINUED | OUTPATIENT
Start: 2023-06-15 | End: 2023-06-15 | Stop reason: HOSPADM

## 2023-06-15 RX ORDER — IPRATROPIUM BROMIDE AND ALBUTEROL SULFATE 2.5; .5 MG/3ML; MG/3ML
3 SOLUTION RESPIRATORY (INHALATION) ONCE AS NEEDED
Status: DISCONTINUED | OUTPATIENT
Start: 2023-06-15 | End: 2023-06-15 | Stop reason: HOSPADM

## 2023-06-15 RX ORDER — NALOXONE HCL 0.4 MG/ML
0.2 VIAL (ML) INJECTION AS NEEDED
Status: DISCONTINUED | OUTPATIENT
Start: 2023-06-15 | End: 2023-06-15 | Stop reason: HOSPADM

## 2023-06-15 RX ORDER — LIDOCAINE HYDROCHLORIDE 10 MG/ML
INJECTION, SOLUTION INFILTRATION; PERINEURAL AS NEEDED
Status: DISCONTINUED | OUTPATIENT
Start: 2023-06-15 | End: 2023-06-15 | Stop reason: SURG

## 2023-06-15 RX ORDER — SODIUM CHLORIDE, SODIUM LACTATE, POTASSIUM CHLORIDE, CALCIUM CHLORIDE 600; 310; 30; 20 MG/100ML; MG/100ML; MG/100ML; MG/100ML
INJECTION, SOLUTION INTRAVENOUS CONTINUOUS PRN
Status: DISCONTINUED | OUTPATIENT
Start: 2023-06-15 | End: 2023-06-15 | Stop reason: SURG

## 2023-06-15 RX ORDER — ROCURONIUM BROMIDE 10 MG/ML
INJECTION, SOLUTION INTRAVENOUS AS NEEDED
Status: DISCONTINUED | OUTPATIENT
Start: 2023-06-15 | End: 2023-06-15 | Stop reason: SURG

## 2023-06-15 RX ORDER — DIPHENHYDRAMINE HYDROCHLORIDE 50 MG/ML
12.5 INJECTION INTRAMUSCULAR; INTRAVENOUS
Status: DISCONTINUED | OUTPATIENT
Start: 2023-06-15 | End: 2023-06-15 | Stop reason: HOSPADM

## 2023-06-15 RX ORDER — PROPOFOL 10 MG/ML
VIAL (ML) INTRAVENOUS AS NEEDED
Status: DISCONTINUED | OUTPATIENT
Start: 2023-06-15 | End: 2023-06-15 | Stop reason: SURG

## 2023-06-15 RX ADMIN — Medication 10 ML: at 08:20

## 2023-06-15 RX ADMIN — PROPOFOL 180 MCG/KG/MIN: 10 INJECTION, EMULSION INTRAVENOUS at 12:49

## 2023-06-15 RX ADMIN — ROCURONIUM BROMIDE 50 MG: 50 INJECTION INTRAVENOUS at 12:49

## 2023-06-15 RX ADMIN — SODIUM CHLORIDE, POTASSIUM CHLORIDE, SODIUM LACTATE AND CALCIUM CHLORIDE: 600; 310; 30; 20 INJECTION, SOLUTION INTRAVENOUS at 12:37

## 2023-06-15 RX ADMIN — GLYCOPYRROLATE 0.2 MG: 0.2 INJECTION INTRAMUSCULAR; INTRAVENOUS at 12:49

## 2023-06-15 RX ADMIN — PROPOFOL 150 MG: 10 INJECTION, EMULSION INTRAVENOUS at 12:49

## 2023-06-15 RX ADMIN — LIDOCAINE HYDROCHLORIDE 50 MG: 10 INJECTION, SOLUTION INFILTRATION; PERINEURAL at 12:49

## 2023-06-15 RX ADMIN — METOPROLOL TARTRATE 12.5 MG: 25 TABLET, FILM COATED ORAL at 08:19

## 2023-06-15 RX ADMIN — VALSARTAN 80 MG: 80 TABLET, FILM COATED ORAL at 08:17

## 2023-06-15 RX ADMIN — SUGAMMADEX 300 MG: 100 INJECTION, SOLUTION INTRAVENOUS at 13:47

## 2023-06-15 NOTE — ANESTHESIA PREPROCEDURE EVALUATION
Anesthesia Evaluation     Patient summary reviewed and Nursing notes reviewed                Airway   Mallampati: III  TM distance: <3 FB  Small opening and Possible difficult intubation  Dental      Pulmonary    (+) a smoker Former,  Cardiovascular     ECG reviewed  Patient on routine beta blocker  Rhythm: irregular    (+) hypertensiondysrhythmias Atrial Fib, hyperlipidemia      Neuro/Psych- negative ROS  GI/Hepatic/Renal/Endo - negative ROS     Musculoskeletal (-) negative ROS    Abdominal    Substance History - negative use     OB/GYN negative ob/gyn ROS         Other                        Anesthesia Plan    ASA 4     general     intravenous induction     Anesthetic plan, risks, benefits, and alternatives have been provided, discussed and informed consent has been obtained with: patient and spouse/significant other.    Plan discussed with CRNA.      CODE STATUS:    Code Status (Patient has no pulse and is not breathing): CPR (Attempt to Resuscitate)  Medical Interventions (Patient has pulse or is breathing): Full Support

## 2023-06-15 NOTE — SIGNIFICANT NOTE
06/15/23 0827   OTHER   Discipline physical therapist   Rehab Time/Intention   Session Not Performed other (see comments)  (pt is up walking independently with nursing staff/family, no further PT needs, will be going home with assist, PT will sign off)

## 2023-06-15 NOTE — ANESTHESIA PROCEDURE NOTES
Airway  Urgency: elective    Date/Time: 6/15/2023 12:51 PM  Airway not difficult    General Information and Staff    Patient location during procedure: OR  CRNA/CAA: Pebbles Campos CRNA    Indications and Patient Condition  Indications for airway management: airway protection    Preoxygenated: yes  Mask difficulty assessment: 1 - vent by mask    Final Airway Details  Final airway type: endotracheal airway      Successful airway: ETT  Cuffed: yes   Successful intubation technique: direct laryngoscopy  Facilitating devices/methods: intubating stylet  Endotracheal tube insertion site: oral  Blade: Marisa  Blade size: 3  ETT size (mm): 8.5  Cormack-Lehane Classification: grade IIa - partial view of glottis  Placement verified by: chest auscultation and capnometry   Cuff volume (mL): 8  Measured from: lips  Number of attempts at approach: 1  Assessment: lips, teeth, and gum same as pre-op and atraumatic intubation

## 2023-06-15 NOTE — PLAN OF CARE
Goal Outcome Evaluation:  Plan of Care Reviewed With: patient        Progress: no change  Outcome Evaluation: Pt is A&O x4. VSS. Did have slightly elevated bp in am with daily medications given. Blood sugars elevated and md aware. New orders noted. Pt will also have in between accu checks to assess pt blood glucose with split lantus. Pt will have an accu check at 0100 with ss coverage. No c/o pain noted. Up ad brittany.

## 2023-06-15 NOTE — OP NOTE
Robotic Bronchoscopy Procedure Note    Procedure:  Bronchoscopy, Diagnostic    Pre-Operative Diagnosis: lung mass    Post-Operative Diagnosis: Same    Indication: lung mass    Anesthesia: General Anesthesia    Procedure Details: Patient was consented for the procedure with all risks and benefits of the procedure explained in detail.  Patient was given the opportunity to ask questions and all concerns were answered.      Findings:  Bronchoscope passed through ETT to the level of the trachea. All airways were visualized to at least the first subsegment level of all 5 lobes of both lungs.  Airways were of normal size and caliber.  No endobronchial lesions seen.  Secretions were suctioned out of proximal airways and then the standard white light bronchoscope was removed and I proceeded with the robotic equipment.    An Ion robotic protocol CT chest performed prior to bronchoscopy to be used with 3-dimensional navigational software for accuracy of navigating to abnormal lesion.  Navigational pathway was planned out with Ion robotic protocol using the recent CT chest images.  Successful registration performed for the case.  After registration was confirmed appropriate I proceeded to navigate to the abnormal lesion following the predetermined appropriate pathway that was created just prior to the case on the navigation software.  I was able to successfully navigate to the abnormal lesion in question.  Radial EBUS confirmed appropriate location.    Transbronchial needle biopsies performed with fluoroscopic guidance and sent for cytotechnologist review.  Radial EBUS passed through the defect from needle biopsy confirming's concentric signal.  Further transbronchial fluoroscopically guided needle aspirate biopsies performed in the right upper lobe mass.  Fluoroscopically guided transbronchial biopsies performed with forceps with multiple passes sent for analysis with pathology.  Bronchoalveolar lavage performed in the  right upper lobe with 180 cc of saline instilled and 40 cc of blood-tinged return.    AUSTYN: Abnormal atypical cells noted by cytotechnologist and sample sent down to pathology for review.  They confirm malignant cells with non-small cell lung cancer.  Final pathology pending.  Remaining samples put in cellblock.  They will separate out cell block sampling in order to have available tissue for genetic testing as needed.          Estimated Blood Loss:  Minimal           Specimens:  as above                Complications:  None; patient tolerated the procedure well.           Disposition: PACU - hemodynamically stable.      Patient tolerated the procedure well.    Tomas Booker MD  6/15/2023  13:52 EDT

## 2023-06-15 NOTE — ANESTHESIA POSTPROCEDURE EVALUATION
Patient: Kamilla Hawthorne    Procedure Summary     Date: 06/15/23 Room / Location:  [facility]    Anesthesia Start: 1237 Anesthesia Stop: 1406    Procedure: BRONCHOSCOPY WITH ION ROBOT WITH FNA, BIOPSIES AND BAL (Right) Diagnosis:       Lung mass      (Lung mass [R91.8])    Surgeons: Tomas Booker MD Provider: Kody Garcia MD    Anesthesia Type: general ASA Status: 4          Anesthesia Type: general    Vitals  Vitals Value Taken Time   /67 06/15/23 1435   Temp     Pulse 70 06/15/23 1435   Resp 16 06/15/23 1426   SpO2 93 % 06/15/23 1435           Post Anesthesia Care and Evaluation    Patient location during evaluation: PACU  Patient participation: complete - patient participated  Level of consciousness: awake  Pain score: 2  Pain management: adequate    Airway patency: patent  Anesthetic complications: No anesthetic complications  PONV Status: none  Cardiovascular status: acceptable  Respiratory status: acceptable  Hydration status: acceptable

## 2023-06-15 NOTE — PROGRESS NOTES
LPC INPATIENT PROGRESS NOTE         [facility] ENDO SUITES    6/15/2023      PATIENT IDENTIFICATION:  Name: Kamilla Hawthorne ADMIT: 2023   : 1945  PCP: Ishmael Medellin MD    MRN: [id number] LOS: 3 days   AGE/SEX: 77 y.o. female  ROOM: ENDO/ENDO                     LOS 3    Reason for visit: Lung mass      SUBJECTIVE:      No new issues.     Objective   OBJECTIVE:    Vital Sign Min/Max for last 24 hours  Temp  Min: 97.9 °F (36.6 °C)  Max: 98.1 °F (36.7 °C)   BP  Min: 131/69  Max: 153/75   Pulse  Min: 63  Max: 71   Resp  Min: 17  Max: 18   SpO2  Min: 94 %  Max: 97 %   No data recorded   Weight  Min: 68.5 kg (151 lb)  Max: 68.5 kg (151 lb)                         Body mass index is 24.37 kg/m².  No intake or output data in the 24 hours ending 06/15/23 1350        Exam:  GEN:  No distress, appears stated age  NECK:  No adenopathy, midline trachea  LUNGS: Non labored    Assessment     Scheduled meds:  amLODIPine, 5 mg, Oral, Q24H  atorvastatin, 10 mg, Oral, Nightly  latanoprost, 1 drop, Both Eyes, Nightly  lidocaine, 1 patch, Transdermal, Q24H  metoprolol tartrate, 12.5 mg, Oral, Q12H  senna-docusate sodium, 2 tablet, Oral, BID  sodium chloride, 10 mL, Intravenous, Q12H  valsartan, 80 mg, Oral, Q24H      IV meds:                      sodium chloride, 30 mL/hr      Data Review:  Results from last 7 days   Lab Units 23  0429 23  0612 23  0430 23  1027   SODIUM mmol/L  --  144 146* 144   POTASSIUM mmol/L 4.3 3.3* 3.5 4.1   CHLORIDE mmol/L  --  110* 114* 111*   CO2 mmol/L  --  24.0 24.1 25.0   BUN mg/dL  --  8 8 14   CREATININE mg/dL  --  0.52* 0.53* 0.72   GLUCOSE mg/dL  --  95 97 114*   CALCIUM mg/dL  --  8.7 8.9 8.3*         Estimated Creatinine Clearance: 98 mL/min (A) (by C-G formula based on SCr of 0.52 mg/dL (L)).  Results from last 7 days   Lab Units 23  0612 23  0430 23  1027   WBC 10*3/mm3 5.56 5.08 7.65   HEMOGLOBIN g/dL 14.1 12.9 15.1    PLATELETS 10*3/mm3 189 176 198         Results from last 7 days   Lab Units 06/12/23  0612 06/11/23  0430 06/09/23  1027   ALT (SGPT) U/L 18 17 18   AST (SGOT) U/L 21 18 20                 No results found for: HGBA1C, POCGLU      CT chest shows 3.7 cm spiculated right upper lobe mass.                  Active Hospital Problems    Diagnosis  POA    **Syncope, unspecified syncope type [R55]  Yes    Lung mass [R91.8]  Unknown      Resolved Hospital Problems   No resolved problems to display.         ASSESSMENT:    Right upper lobe spiculated mass concerning for malignancy  History of tobacco use  Syncope: Likely secondary to atrial fibrillation        PLAN:    Plan for robotic bronchoscopy for tissue sampling of right upper lung mass today.   We will hold her blood thinner and resume on Friday.  Consent obtained.  Discussed with Dr Medellin.      Tomas Booekr MD  Pulmonary and Critical Care Medicine  [facility], [facility]  6/15/2023    13:50 EDT   Addendum:  Preliminary pathology from robotic bronchoscopy positive for non-small cell lung cancer.  Awaiting final pathology results but my office will order a PET scan for staging and refer to oncology and thoracic surgery.  Have patient follow-up in my office in a week with pulmonary function testing.    Electronically signed by Tomas Booker MD, 06/15/23, 1:56 PM EDT.

## 2023-06-15 NOTE — DISCHARGE SUMMARY
Date of Admission: 6/9/2023  Date of Discharge:  6/15/2023    Discharge Diagnosis:    Syncope, unspecified syncope type    Lung mass  Presumed non-small cell lung cancer right upper lobe  Paroxysmal atrial fibrillation  Hypertension  Hyperlipidemia  Distant history of malignant melanoma    Consults:   Consults       Date and Time Order Name Status Description    6/11/2023  2:02 PM Inpatient Pulmonology Consult Completed     6/9/2023  5:44 PM Inpatient Cardiology Consult Completed     6/9/2023 12:52 PM IP General Consult (Use specialty-specific consult if known)                DETAILS OF HOSPITAL STAY     Hospital Course  Patient is a 77 y.o. female presented with recurrence of syncope on the day of admission.  Initially the symptoms sounded somewhat vasovagal orthostatic as she had felt poorly all morning.  Nonetheless she did previous spells of syncope over the last month that seem to be more sudden in onset with no prodrome.  Initial evaluation was unremarkable.  There is no evidence of orthostasis at any time during the hospital stay and this was checked repeatedly.  She did have 1 spell of atrial fibrillation with a ventricular response of 120-130 lasted 1 hour.  A TTE was unremarkable.  Ejection fraction was 57%.  She was seen in consultation by the cardiology service.  The possibility of sick sinus syndrome or a post fibrillation pauses was felt to be a potential cause of syncope and she was discharged on a 2-week Zio patch.    A CTA scan of the chest was performed as part of her atrial fibrillation evaluation and a 3.7 cm spiculated right upper lobe suspicious mass was seen.  A navigational bronchoscopy done on the day of discharge suggested a non-small cell lung cancer on the frozen specimens and a final path report is pending.  Patient was discharged on her Zio patch.  A PET scan has been scheduled as well as PFTs and thoracic surgery consultation along with oncological consultation.  A CT of the brain and  an MRI of the brain with and without contrast were unremarkable during the hospital stay.  An EEG failed to reveal a source of her syncope.  Blood pressure is elevated during the hospital stay and Diovan 80 mg daily along with Norvasc 5 mg daily and metoprolol 12.5 mg twice daily were added for blood pressure control as well as rate control.  She will be discharged on Eliquis 5 mg twice daily to begin 48 hours after her bronchoscopy.  She will be followed up in the office in 1 week.      Discharge Medications     Discharge Medications        New Medications        Instructions Start Date   amLODIPine 5 MG tablet  Commonly known as: NORVASC   5 mg, Oral, Every 24 Hours Scheduled   Start Date: June 16, 2023     metoprolol tartrate 25 MG tablet  Commonly known as: LOPRESSOR   12.5 mg, Oral, Every 12 Hours Scheduled      valsartan 80 MG tablet  Commonly known as: DIOVAN   80 mg, Oral, Every 24 Hours Scheduled   Start Date: June 16, 2023            Continue These Medications        Instructions Start Date   atorvastatin 10 MG tablet  Commonly known as: LIPITOR   10 mg, Oral, Daily      cholecalciferol 25 MCG (1000 UT) tablet  Commonly known as: VITAMIN D3   1,000 Units, Oral, Daily      dorzolamide-timolol 22.3-6.8 MG/ML ophthalmic solution  Commonly known as: COSOPT   INSTILL 1 DROP BY OPHTHALMIC ROUTE 2 TIMES EVERY DAY INTO BOTH EYES      latanoprost 0.005 % ophthalmic solution  Commonly known as: XALATAN   INSTILL 1 DROP BY OPHTHALMIC ROUTE EVERY DAY BOTH EYES AT BEDTIME      vitamin C 500 MG tablet  Commonly known as: ASCORBIC ACID   500 mg, Oral, Daily                 Pertinent Test Results: TTE, CT of brain without contrast, MRI brain with and without contrast, EEG, CTA chest, navigational bronchoscopy    Condition on Discharge: Stable      Vital Signs  Temp:  [97.4 °F (36.3 °C)-98.1 °F (36.7 °C)] 97.4 °F (36.3 °C)  Heart Rate:  [62-72] 62  Resp:  [15-18] 16  BP: (131-156)/(63-93) 154/74    Flowsheet Rows   "    Flowsheet Row First Filed Value   Admission Height 167.6 cm (66\") Documented at 06/09/2023 1030   Admission Weight 68.5 kg (151 lb) Documented at 06/09/2023 1030              Discharge Disposition  Home or Self Care      Discharge Diet:   Regular    Activity at Discharge:   Unrestricted    Follow-up Appointments  Future Appointments   Date Time Provider Department Center   9/15/2023  1:30 PM Ishmael Medellin MD [id number]         Test Results Pending at Discharge  Pending Labs       Order Current Status    Fine Needle Aspiration Collected (06/15/23 1337)    Non-gynecologic Cytology Collected (06/15/23 1346)    AFB Culture - Lavage, Lung, Right Upper Lobe In process    BAL Culture, Quantitative - Lavage, Lung, Right Upper Lobe In process    Fungus Culture - Lavage, Lung, Right Upper Lobe In process    Fungus Smear - Lavage, Lung, Right Upper Lobe In process    Methylmalonic Acid, Serum In process    Tissue Pathology Exam In process             Greater than 30 minutes spent coordinating patient's discharge.    Ishmael Medellin MD  06/15/23  17:12 EDT    Dragon disclaimer:   Part of this note may be an electronic transcription/translation of spoken language to printed text using the Dragon Dictation System.         "

## 2023-06-15 NOTE — DISCHARGE PLACEMENT REQUEST
"Myesha Matos (77 y.o. Female)       Date of Birth   1945    Social Security Number       Address   [address] [place] [address]    Home Phone   [phone]    MRN   [id number]       Hinduism   Denominational    Marital Status                               Admission Date   6/9/23    Admission Type   Emergency    Admitting Provider   Ishmael Medellin MD    Attending Provider   Ishmael Medellin MD    Department, Room/Bed   [address], [address]       Discharge Date       Discharge Disposition       Discharge Destination                                 Attending Provider: Ishmael Medellin MD    Allergies: Penicillins    Isolation: None   Infection: None   Code Status: CPR    Ht: 167.6 cm (66\")   Wt: 68.5 kg (151 lb)    Admission Cmt: None   Principal Problem: Syncope, unspecified syncope type [R55]                   Active Insurance as of 6/9/2023       Primary Coverage       Payor Plan Insurance Group Employer/Plan Group    ANTHEM MEDICARE REPLACEMENT ANTHEM MEDICARE ADVANTAGE [id number]       Payor Plan Address Payor Plan Phone Number Payor Plan Fax Number Effective Dates    [address] [phone]  1/1/2018 - None Entered    [facility] [address]         Subscriber Name Subscriber Birth Date Member ID       MYESHA MATOS 1945 [id number]                     Emergency Contacts        (Rel.) Home Phone Work Phone Mobile Phone    ChristophEstuardo Sumit (Spouse) [phone] -- [phone]    ValderramaXi (Daughter) -- -- [phone]    Cesario Brambila (Daughter) -- -- [phone]                "

## 2023-06-15 NOTE — PROGRESS NOTES
"    Patient Name: Kamilla Hawthorne  :1945  77 y.o.      Patient Care Team:  Ishmael Medellin MD as PCP - General (Internal Medicine)  Ishmael Lozano Jr., MD (Dermatology)  Suhail Rothman MD as Consulting Physician (Gastroenterology)    Chief Complaint: follow up PAF    Interval History: she is resting in bed. BP stable. Remains in SR. HR 50's but tolerating BB.        Objective   Vital Signs  Temp:  [97.9 °F (36.6 °C)-98.1 °F (36.7 °C)] 97.9 °F (36.6 °C)  Heart Rate:  [63-71] 66  Resp:  [18] 18  BP: (131-153)/(69-93) 144/93  No intake or output data in the 24 hours ending 06/15/23 1038    Flowsheet Rows      Flowsheet Row First Filed Value   Admission Height 167.6 cm (66\") Documented at 2023 1030   Admission Weight 68.5 kg (151 lb) Documented at 2023 1030            Physical Exam:   General Appearance:    Alert, cooperative, in no acute distress   Lungs:     Clear to auscultation.  Normal respiratory effort and rate.      Heart:    Regular rhythm and normal rate, normal S1 and S2, no murmurs, gallops or rubs.     Chest Wall:    No abnormalities observed   Abdomen:     Soft, nontender, positive bowel sounds.     Extremities:   no cyanosis, clubbing or edema.  No marked joint deformities.  Adequate musculoskeletal strength.       Results Review:    Results from last 7 days   Lab Units 23  0429 23  0612   SODIUM mmol/L  --  144   POTASSIUM mmol/L 4.3 3.3*   CHLORIDE mmol/L  --  110*   CO2 mmol/L  --  24.0   BUN mg/dL  --  8   CREATININE mg/dL  --  0.52*   GLUCOSE mg/dL  --  95   CALCIUM mg/dL  --  8.7     Results from last 7 days   Lab Units 23  1846 23  1640 23  1027   HSTROP T ng/L 41* 37* 33*     Results from last 7 days   Lab Units 23  0612   WBC 10*3/mm3 5.56   HEMOGLOBIN g/dL 14.1   HEMATOCRIT % 41.8   PLATELETS 10*3/mm3 189         Results from last 7 days   Lab Units 23  0429   MAGNESIUM mg/dL 2.0                   Medication Review:   amLODIPine, 5 " mg, Oral, Q24H  atorvastatin, 10 mg, Oral, Nightly  latanoprost, 1 drop, Both Eyes, Nightly  lidocaine, 1 patch, Transdermal, Q24H  metoprolol tartrate, 12.5 mg, Oral, Q12H  senna-docusate sodium, 2 tablet, Oral, BID  sodium chloride, 10 mL, Intravenous, Q12H  valsartan, 80 mg, Oral, Q24H              Assessment & Plan   Syncope - plan for 2 week monitor to rule out conversion pause.   Right upper lob lung mass, bronch / biopsy planned for tomorrow. Lovenox held.   PAF (new) currently in SR. Tolerating low dose beta blocker.  Echocardiogram with normal LVEF, normal bi atrial size, no significant valve disease. CHADs 2 Vasc score 4. Plan for apixaban to start Friday morning.   Hypertension - currently well controlled. Continue same.   Elevated ddimer - PE ruled out.     2 week monitor ordered.   Continue low dose beta blocker.     Follow up in office in 4 weeks. Will see as needed.     BELLA Madrigal  [facility]  06/15/23  10:38 EDT  .

## 2023-06-15 NOTE — OUTREACH NOTE
Prep Survey      Flowsheet Row Responses   [facility] facility patient discharged from? [place]   Is LACE score < 7 ? No   Eligibility [facility]   Date of Admission 06/09/23   Date of Discharge 06/15/23   Discharge Disposition Home or Self Care   Discharge diagnosis Syncope   Does the patient have one of the following disease processes/diagnoses(primary or secondary)? Other   Does the patient have Home health ordered? Yes   What is the Home health agency?  [facility]   Is there a DME ordered? No   Prep survey completed? Yes            BAKARI PROCTOR - Registered Nurse

## 2023-06-15 NOTE — PROGRESS NOTES
"   LOS: 3 days    Patient Care Team:  Ishmael Medellin MD as PCP - General (Internal Medicine)  Ishmael Lozano Jr., MD (Dermatology)  Suhail Rothman MD as Consulting Physician (Gastroenterology)    Chief Complaint:    Chief Complaint   Patient presents with   • Syncope     Subjective     Interval History:     Patient Complaints: No complaints today.  No recurrent arrhythmias.  It turns out that her family all thought the bronchoscopy was going to be Tuesday so that memory lapse turns out to be erroneous.  She certainly knows that Iman is the president this morning.    Objective     Vital Signs  Temp:  [97.9 °F (36.6 °C)-98.1 °F (36.7 °C)] 97.9 °F (36.6 °C)  Heart Rate:  [63-71] 66  Resp:  [18] 18  BP: (131-153)/(69-93) 144/93    Flowsheet Rows    Flowsheet Row First Filed Value   Admission Height 167.6 cm (66\") Documented at 06/09/2023 1030   Admission Weight 68.5 kg (151 lb) Documented at 06/09/2023 1030          No intake/output data recorded.  I/O last 3 completed shifts:  In: 720 [P.O.:720]  Out: -   No intake or output data in the 24 hours ending 06/15/23 0932    Physical Exam:   General Appearance:    Alert, pleasantly confused and cooperative, in no acute distress       Eyes:            Conjunctivae and sclerae normal, no   icterus, PERRLA   Neck:          Lungs:     Clear to auscultation,respirations regular, even and                  unlabored    Heart:   Irregularly irregular and slightly tachycardic normal S1 and S2, no murmur, no gallop, no rub, no click       Abdomen:            Extremities:    Pulses:        Lymph nodes:          Results Review:    I reviewed the patient's new clinical results.  I reviewed the patient's new imaging results and agree with the interpretation.  I personally viewed and interpreted the patient's EKG/Telemetry data  Results from last 7 days   Lab Units 06/14/23  0429 06/12/23  0612 06/11/23  0430 06/09/23  1027   SODIUM mmol/L  --  144 146* 144   POTASSIUM mmol/L 4.3 3.3* " 3.5 4.1   CHLORIDE mmol/L  --  110* 114* 111*   CO2 mmol/L  --  24.0 24.1 25.0   BUN mg/dL  --  8 8 14   CREATININE mg/dL  --  0.52* 0.53* 0.72   CALCIUM mg/dL  --  8.7 8.9 8.3*   BILIRUBIN mg/dL  --  1.3* 1.3* 1.4*   ALK PHOS U/L  --  80 74 81   ALT (SGPT) U/L  --  18 17 18   AST (SGOT) U/L  --  21 18 20   GLUCOSE mg/dL  --  95 97 114*       Estimated Creatinine Clearance: 98 mL/min (A) (by C-G formula based on SCr of 0.52 mg/dL (L)).    Results from last 7 days   Lab Units 06/14/23  0429 06/13/23  0349 06/12/23  0826   MAGNESIUM mg/dL 2.0 1.8 1.9             Results from last 7 days   Lab Units 06/12/23  0612 06/11/23  0430 06/09/23  1027   WBC 10*3/mm3 5.56 5.08 7.65   HEMOGLOBIN g/dL 14.1 12.9 15.1   PLATELETS 10*3/mm3 189 176 198               Imaging Results (Last 24 Hours)     ** No results found for the last 24 hours. **        amLODIPine, 5 mg, Oral, Q24H  atorvastatin, 10 mg, Oral, Nightly  latanoprost, 1 drop, Both Eyes, Nightly  lidocaine, 1 patch, Transdermal, Q24H  metoprolol tartrate, 12.5 mg, Oral, Q12H  senna-docusate sodium, 2 tablet, Oral, BID  sodium chloride, 10 mL, Intravenous, Q12H  valsartan, 80 mg, Oral, Q24H           Medication Review:   Current Facility-Administered Medications   Medication Dose Route Frequency Provider Last Rate Last Admin   • acetaminophen (TYLENOL) tablet 650 mg  650 mg Oral Q6H PRN Clarissa Shaw MD   650 mg at 06/14/23 2020   • amLODIPine (NORVASC) tablet 5 mg  5 mg Oral Q24H Clarissa Shaw MD   5 mg at 06/14/23 0850   • atorvastatin (LIPITOR) tablet 10 mg  10 mg Oral Nightly Ishmael Medellin MD   10 mg at 06/14/23 2021   • sennosides-docusate (PERICOLACE) 8.6-50 MG per tablet 2 tablet  2 tablet Oral BID Ishmael Medellin MD        And   • polyethylene glycol (MIRALAX) packet 17 g  17 g Oral Daily PRN Ishmael Medellin MD        And   • bisacodyl (DULCOLAX) EC tablet 5 mg  5 mg Oral Daily PRN Ishmael Medellin MD        And   • bisacodyl (DULCOLAX) suppository 10 mg   10 mg Rectal Daily PRN Ishmael Medellin MD       • calcium carbonate (TUMS) chewable tablet 500 mg (200 mg elemental)  2 tablet Oral TID PRN Ishmael Medellin MD       • Calcium Replacement - Follow Nurse / BPA Driven Protocol   Does not apply PRN Sneha Reed APRN       • latanoprost (XALATAN) 0.005 % ophthalmic solution 1 drop  1 drop Both Eyes Nightly Ishmael Medellin MD   1 drop at 06/14/23 2023   • lidocaine (LIDODERM) 5 % 1 patch  1 patch Transdermal Q24H Clarissa Shaw MD   1 patch at 06/13/23 0927   • Magnesium Standard Dose Replacement - Follow Nurse / BPA Driven Protocol   Does not apply PRN Sneha Reed APRN       • metoprolol tartrate (LOPRESSOR) injection 5 mg  5 mg Intravenous Q5 Min PRN Sneha Reed APRN   5 mg at 06/12/23 0831   • metoprolol tartrate (LOPRESSOR) tablet 12.5 mg  12.5 mg Oral Q12H Ishmael Medellin MD   12.5 mg at 06/15/23 0819   • nitroglycerin (NITROSTAT) SL tablet 0.4 mg  0.4 mg Sublingual Q5 Min PRN Ishmael Medellin MD       • Phosphorus Replacement - Follow Nurse / BPA Driven Protocol   Does not apply PRN Sneha Reed APRN       • Potassium Replacement - Follow Nurse / BPA Driven Protocol   Does not apply PRN Sneha Reed APRN       • sodium chloride 0.9 % flush 10 mL  10 mL Intravenous PRN Deshaun Pearl MD   10 mL at 06/09/23 1027   • sodium chloride 0.9 % flush 10 mL  10 mL Intravenous Q12H Ishmael Medellin MD   10 mL at 06/15/23 0820   • sodium chloride 0.9 % flush 10 mL  10 mL Intravenous PRN Ishmael Medellin MD       • sodium chloride 0.9 % infusion 40 mL  40 mL Intravenous PRN Ishmael Medellin MD       • valsartan (DIOVAN) tablet 80 mg  80 mg Oral Q24H Ishmael Medellin MD   80 mg at 06/15/23 0817       Assessment & Plan       Syncope, unspecified syncope type    Lung mass  New onset atrial fibrillation with RVR.  Likely related to sick sinus syndrome.  Incidental right upper lobe mass concerning for malignancy  Likely recent onset  dementia  Hypertension  Distant history of malignant melanoma    It is beginning look like sick sinus syndrome may be the cause of her syncope, i.e. bradycardia arrhythmias.  She has an underlying sinus bradycardia and now atrial fibrillation with RVR.  Beta-blockers have been added.  Back in normal sinus rhythm.  We will plan 14-day Holter monitor planned at discharge.  If that is uneventful we might go with a 3-month event recorder.  On for bronchoscopy today regarding the incidental right upper lobe mass.  Presumably she will be discharged sometime after the bronchoscopy or Friday morning on Eliquis per pulmonary.  She will have an [facility] [place] memory screen at her next office visit.  It is certainly possible that some of her earlier cognitive issues are postconcussive.  Discussed all of the above with patient and daughter today.      Ishmael Medellin MD  06/15/23  09:32 EDT

## 2023-06-16 ENCOUNTER — TRANSCRIBE ORDERS (OUTPATIENT)
Dept: HOME HEALTH SERVICES | Facility: HOME HEALTHCARE | Age: 78
End: 2023-06-16
Payer: MEDICARE

## 2023-06-16 ENCOUNTER — TRANSITIONAL CARE MANAGEMENT TELEPHONE ENCOUNTER (OUTPATIENT)
Dept: CALL CENTER | Facility: HOSPITAL | Age: 78
End: 2023-06-16
Payer: MEDICARE

## 2023-06-16 ENCOUNTER — TELEPHONE (OUTPATIENT)
Dept: INTERNAL MEDICINE | Facility: CLINIC | Age: 78
End: 2023-06-16
Payer: MEDICARE

## 2023-06-16 ENCOUNTER — TRANSCRIBE ORDERS (OUTPATIENT)
Dept: PULMONOLOGY | Facility: HOSPITAL | Age: 78
End: 2023-06-16
Payer: MEDICARE

## 2023-06-16 DIAGNOSIS — S09.90XA CLOSED HEAD INJURY, INITIAL ENCOUNTER: ICD-10-CM

## 2023-06-16 DIAGNOSIS — I48.91 NEW ONSET A-FIB: Primary | ICD-10-CM

## 2023-06-16 DIAGNOSIS — R91.1 LUNG NODULE: Primary | ICD-10-CM

## 2023-06-16 DIAGNOSIS — R91.8 LUNG MASS: ICD-10-CM

## 2023-06-16 DIAGNOSIS — I49.5 SICK SINUS SYNDROME: ICD-10-CM

## 2023-06-16 LAB
METHYLMALONATE SERPL-SCNC: 77 NMOL/L (ref 0–378)
NIGHT BLUE STAIN TISS: NORMAL

## 2023-06-16 NOTE — PROGRESS NOTES
[facility]. Patient denies having had previous HH services. PCP and contact information confirmed. Please call  Christ[facility]'s cell to schedule HH visits, [phone].

## 2023-06-16 NOTE — TELEPHONE ENCOUNTER
"Spouse called to follow up on \"PET scan and was the pulmonologist will be scheduling this appointment, or our office, also he had concerns regarding 2 consults that he is suppose to have, patient is confused and needs clarity.\"     Per Dr. Medellin, \"I believe that Dr. Booker set him up for a PET scan, PFTs in his office, and consultation with thoracic surgery and oncology.  Someone should be calling to schedule those visits.  The most important one is the PET scan so see if we can check on that and/or help expedite it. \"    **Please see telephone encounter for original documentation from Estuardo Hawthorne (spouse's) chart.  "

## 2023-06-16 NOTE — OUTREACH NOTE
Call Center TCM Note      Flowsheet Row Responses   [facility] patient discharged from? [place]   Does the patient have one of the following disease processes/diagnoses(primary or secondary)? Other   TCM attempt successful? Yes   Call start time 1104   Call end time 1108   Is patient permission given to speak with other caregiver? Yes   List who call center can speak with Xi-daughter.   Person spoke with today (if not patient) and relationship [employer]-daughter with patient present.   Meds reviewed with patient/caregiver? Yes   Is the patient having any side effects they believe may be caused by any medication additions or changes? No   Does the patient have all medications ordered at discharge? Yes   Is the patient taking all medications as directed (includes completed medication regime)? Yes   Comments PCP hospital f/u 06/22/23, which is within TCM time frame. Daughter will call to make CT, oncology f/u appts.   Does the patient have an appointment with their PCP within 7 days of discharge? Yes   Has home health visited the patient within 72 hours of discharge? Call prior to 72 hours   Psychosocial issues? No   Psychosocial comments DaughterXi, is with patient today. Daughter is PT with [facility].   Did the patient receive a copy of their discharge instructions? Yes   Nursing interventions Reviewed instructions with patient   What is the patient's perception of their health status since discharge? Improving   Is the patient/caregiver able to teach back signs and symptoms related to disease process for when to call PCP? Yes   Is the patient/caregiver able to teach back signs and symptoms related to disease process for when to call 911? Yes   Is the patient/caregiver able to teach back the hierarchy of who to call/visit for symptoms/problems? PCP, Specialist, Home health nurse, Urgent Care, ED, 911 Yes   Additional teach back comments Daughter states patient monitoring BP/pulse/O2 sats and keeping record  for appts.   TCM call completed? Yes   Wrap up additional comments Daughter, Xi, states patient is improving, but still has fatigue. Denies any needs/concerns today. States will be calling CT/oncology for f/u appts. Very pleased with care that patient received-relayed to nurse manager of [address] [place] [address]. TCM complete.   Call end time 1108   Would this patient benefit from a Referral to [facility] Social Work? No   Is the patient interested in additional calls from an ambulatory ?  NOTE:  applies to high risk patients requiring additional follow-up. No            Jessica George RN    6/16/2023, 11:15 EDT

## 2023-06-16 NOTE — PROGRESS NOTES
Discharge Planning Assessment  [facility]     Patient Name: Kamilla Hawthorne  MRN: [id number]  Today's Date: 6/16/2023    Admit Date: 6/9/2023    Plan: Home with [facility]   Discharge Needs Assessment    No documentation.                  Discharge Plan       Row Name 06/16/23 0905       Plan    Plan Home with [facility]    Final Discharge Disposition Code 06 - home with home health care    Final Note Home with [facility]                  Continued Care and Services - Discharged on 6/15/2023 Admission date: 6/9/2023 - Discharge disposition: Home or Self Care      Home Medical Care       Service Provider Request Status Selected Services Address Phone Fax Patient Preferred    [facility] Accepted N/A [address] [address] [phone] --                  Expected Discharge Date and Time       Expected Discharge Date Expected Discharge Time    Samuel 15, 2023            Demographic Summary    No documentation.                  Functional Status    No documentation.                  Psychosocial    No documentation.                  Abuse/Neglect    No documentation.                  Legal    No documentation.                  Substance Abuse    No documentation.                  Patient Forms    No documentation.                     Lizet Ochoa, RN

## 2023-06-16 NOTE — TELEPHONE ENCOUNTER
Spouse was informed & verbalized understanding. Informed him that patient seems to be scheduled for PET scan on 21'st, so they should be calling him to let him know.

## 2023-06-17 ENCOUNTER — HOME CARE VISIT (OUTPATIENT)
Dept: HOME HEALTH SERVICES | Facility: HOME HEALTHCARE | Age: 78
End: 2023-06-17
Payer: COMMERCIAL

## 2023-06-17 VITALS
RESPIRATION RATE: 20 BRPM | WEIGHT: 153 LBS | BODY MASS INDEX: 24.69 KG/M2 | TEMPERATURE: 97.8 F | OXYGEN SATURATION: 99 % | DIASTOLIC BLOOD PRESSURE: 64 MMHG | SYSTOLIC BLOOD PRESSURE: 126 MMHG | HEART RATE: 68 BPM

## 2023-06-17 LAB
BACTERIA SPEC AEROBE CULT: NO GROWTH
GRAM STN SPEC: NORMAL

## 2023-06-17 PROCEDURE — G0299 HHS/HOSPICE OF RN EA 15 MIN: HCPCS

## 2023-06-17 NOTE — Clinical Note
77 year old female admitted to homecare services s/p acute hospitalization 6/9-6/15/23 at [facility]. Patient presented to ER with syncope at home and closed head injury. Patient reports she fell in her kitchen. Diagnosis Paroxysmal atrial fibrillation,hypertension,hyperlipidemia.  A TTE was unremarkable. Ejection fraction was 57%.  She was seen in consultation by the cardiology service. The possibility of sick sinus syndrome or a post fibrillation pauses was felt to be a potential cause of syncope and she was discharged on a 2-week Zio patch. CT SCAN of chest revealed a lung mass in right lobe, suspicious for non-small cell lung cancer. Patient had robotic lung bronchoscopy with biopsies and pathology pending. However, during sn visit, MD phoned patient and infomed that biospy result was inconclusive but still suspicious so repeat testing will need to be done. Patient to hold Eliquis until after testing and when cleared to take by MD. Patient lives in a first floor condominium with spouse with no stairs. Patient prior functioning was independent. Current level of functioning requires some assistance with ADLS like bathing. Patient using fww to ambulate around home. Patients daughters work for [employer] as physical therapist and speech therapist and are assisting patient. Patient decline home PT/OT.  SN visits 1w2,2w1,1w2 focus of care teaching AFIB disease process, teaching medications and med effects, ss of bleeding, assessment of all systems, pain managment, and safety.

## 2023-06-18 NOTE — HOME HEALTH
77 year old female admitted to homecare services s/p acute hospitalization 6/9-6/15/23 at [facility]. Patient presented to ER with syncope at home and closed head injury. Patient reports she fell in her kitchen. Diagnosis Paroxysmal atrial fibrillation,hypertension,hyperlipidemia. A TTE was unremarkable. Ejection fraction was 57%. She was seen in consultation by the cardiology service. The possibility of sick sinus syndrome or a post fibrillation pauses was felt to be a potential cause of syncope and she was discharged on a 2-week Zio patch. CT SCAN of chest revealed a lung mass in right lobe, suspicious for non-small cell lung cancer. Patient had robotic lung bronchoscopy with biopsies and pathology pending. However, during sn visit, MD phoned patient and infomed that biospy result was inconclusive but still suspicious so repeat testing will need to be done. Patient to hold Eliquis until after testing and when cleared to take by MD. Patient lives in a first floor condominium with spouse with no stairs. Patient prior functioning was independent. Current level of functioning requires some assistance with ADLS like bathing. Patient using fww to ambulate around home. Patients daughters work for [employer] as physical therapist and speech therapist and are assisting patient. Patient decline home PT/OT. SN visits 1w2,2w1,1w2 focus of care teaching AFIB disease process, teaching medications and med effects, ss of bleeding, assessment of all systems, pain managment, and safety.

## 2023-06-19 LAB
CYTO UR: NORMAL
DX PRELIMINARY: NORMAL
LAB AP CASE REPORT: NORMAL
LAB AP CASE REPORT: NORMAL
LAB AP DIAGNOSIS COMMENT: NORMAL
LAB AP NON-GYN SPECIMEN ADEQUACY: NORMAL
PATH REPORT.FINAL DX SPEC: NORMAL
PATH REPORT.FINAL DX SPEC: NORMAL
PATH REPORT.GROSS SPEC: NORMAL
PATH REPORT.GROSS SPEC: NORMAL

## 2023-06-22 PROBLEM — R91.8 LUNG MASS: Chronic | Status: ACTIVE | Noted: 2023-06-09

## 2023-06-22 PROBLEM — I48.0 PAROXYSMAL ATRIAL FIBRILLATION: Status: ACTIVE | Noted: 2023-06-22

## 2023-06-22 LAB
FUNGUS WND CULT: NORMAL
MYCOBACTERIUM SPEC CULT: NORMAL
NIGHT BLUE STAIN TISS: NORMAL

## 2023-06-29 LAB
FUNGUS WND CULT: NORMAL
MYCOBACTERIUM SPEC CULT: NORMAL
NIGHT BLUE STAIN TISS: NORMAL

## 2023-07-06 LAB
FUNGUS WND CULT: NORMAL
MYCOBACTERIUM SPEC CULT: NORMAL
NIGHT BLUE STAIN TISS: NORMAL

## 2023-07-12 PROCEDURE — 77263 THER RADIOLOGY TX PLNG CPLX: CPT | Performed by: STUDENT IN AN ORGANIZED HEALTH CARE EDUCATION/TRAINING PROGRAM

## 2023-07-13 ENCOUNTER — HOSPITAL ENCOUNTER (OUTPATIENT)
Dept: RADIATION ONCOLOGY | Facility: HOSPITAL | Age: 78
Setting detail: RADIATION/ONCOLOGY SERIES
End: 2023-07-13
Payer: MEDICARE

## 2023-07-13 LAB
FUNGUS WND CULT: NORMAL
MYCOBACTERIUM SPEC CULT: NORMAL
NIGHT BLUE STAIN TISS: NORMAL

## 2023-07-13 PROCEDURE — 77334 RADIATION TREATMENT AID(S): CPT | Performed by: STUDENT IN AN ORGANIZED HEALTH CARE EDUCATION/TRAINING PROGRAM

## 2023-07-14 PROCEDURE — 77338 DESIGN MLC DEVICE FOR IMRT: CPT | Performed by: STUDENT IN AN ORGANIZED HEALTH CARE EDUCATION/TRAINING PROGRAM

## 2023-07-14 PROCEDURE — 77300 RADIATION THERAPY DOSE PLAN: CPT | Performed by: STUDENT IN AN ORGANIZED HEALTH CARE EDUCATION/TRAINING PROGRAM

## 2023-07-14 PROCEDURE — 77293 RESPIRATOR MOTION MGMT SIMUL: CPT | Performed by: STUDENT IN AN ORGANIZED HEALTH CARE EDUCATION/TRAINING PROGRAM

## 2023-07-14 PROCEDURE — 77301 RADIOTHERAPY DOSE PLAN IMRT: CPT | Performed by: STUDENT IN AN ORGANIZED HEALTH CARE EDUCATION/TRAINING PROGRAM

## 2023-07-19 PROCEDURE — 77373 STRTCTC BDY RAD THER TX DLVR: CPT | Performed by: STUDENT IN AN ORGANIZED HEALTH CARE EDUCATION/TRAINING PROGRAM

## 2023-07-19 PROCEDURE — 77427 RADIATION TX MANAGEMENT X5: CPT | Performed by: STUDENT IN AN ORGANIZED HEALTH CARE EDUCATION/TRAINING PROGRAM

## 2023-07-20 PROCEDURE — 77373 STRTCTC BDY RAD THER TX DLVR: CPT | Performed by: STUDENT IN AN ORGANIZED HEALTH CARE EDUCATION/TRAINING PROGRAM

## 2023-07-21 PROCEDURE — 77373 STRTCTC BDY RAD THER TX DLVR: CPT | Performed by: STUDENT IN AN ORGANIZED HEALTH CARE EDUCATION/TRAINING PROGRAM

## 2023-07-21 PROCEDURE — 77336 RADIATION PHYSICS CONSULT: CPT | Performed by: STUDENT IN AN ORGANIZED HEALTH CARE EDUCATION/TRAINING PROGRAM

## 2023-07-24 ENCOUNTER — RADIATION ONCOLOGY WEEKLY ASSESSMENT (OUTPATIENT)
Dept: RADIATION ONCOLOGY | Facility: HOSPITAL | Age: 78
End: 2023-07-24
Payer: MEDICARE

## 2023-07-24 ENCOUNTER — HOSPITAL ENCOUNTER (OUTPATIENT)
Dept: RADIATION ONCOLOGY | Facility: HOSPITAL | Age: 78
Discharge: HOME | End: 2023-07-24
Payer: MEDICARE

## 2023-07-24 ENCOUNTER — TREATMENT (OUTPATIENT)
Dept: RADIATION ONCOLOGY | Facility: HOSPITAL | Age: 78
End: 2023-07-24

## 2023-07-24 VITALS
DIASTOLIC BLOOD PRESSURE: 74 MMHG | WEIGHT: 150.6 LBS | SYSTOLIC BLOOD PRESSURE: 146 MMHG | BODY MASS INDEX: 24.31 KG/M2 | HEART RATE: 61 BPM | OXYGEN SATURATION: 99 %

## 2023-07-24 DIAGNOSIS — R91.8 LUNG MASS: Primary | Chronic | ICD-10-CM

## 2023-07-24 DIAGNOSIS — R91.8 LUNG MASS: Primary | ICD-10-CM

## 2023-07-24 LAB
RAD ONC ARIA COURSE ID: NORMAL
RAD ONC ARIA COURSE INTENT: NORMAL
RAD ONC ARIA COURSE LAST TREATMENT DATE: NORMAL
RAD ONC ARIA COURSE START DATE: NORMAL
RAD ONC ARIA COURSE TREATMENT ELAPSED DAYS: 5
RAD ONC ARIA FIRST TREATMENT DATE: NORMAL
RAD ONC ARIA PLAN FRACTIONS TREATED TO DATE: 4
RAD ONC ARIA PLAN ID: NORMAL
RAD ONC ARIA PLAN PRESCRIBED DOSE PER FRACTION: 12 GY
RAD ONC ARIA PLAN PRIMARY REFERENCE POINT: NORMAL
RAD ONC ARIA PLAN TOTAL FRACTIONS PRESCRIBED: 4
RAD ONC ARIA PLAN TOTAL PRESCRIBED DOSE: 4800 CGY
RAD ONC ARIA REFERENCE POINT DOSAGE GIVEN TO DATE: 48 GY
RAD ONC ARIA REFERENCE POINT ID: NORMAL
RAD ONC ARIA REFERENCE POINT SESSION DOSAGE GIVEN: 12 GY

## 2023-07-24 PROCEDURE — 77373 STRTCTC BDY RAD THER TX DLVR: CPT | Performed by: STUDENT IN AN ORGANIZED HEALTH CARE EDUCATION/TRAINING PROGRAM

## 2023-07-24 NOTE — PROGRESS NOTES
Radiation Oncology  On-Treatment Note      Patient: Kamilla Hawthorne    MRN: [id number]    Attending Physician: Benjie Valenzuela MD     Diagnosis:     ICD-10-CM ICD-9-CM   1. Lung mass  R91.8 786.6       Radiation Therapy Visit:  Continue radiation therapy, Dosimetry plan remains acceptable, Films reviewed and remains acceptable, Pain assessed, Pain management planned, Radiation dose schedule reviewed and remains acceptable, Radiation technique remains acceptable, and Symptoms within expected range    Radiation Treatments       Active   Reference Points   PTV   Most recent treatment: Dose given: 1,200 cGy (on 7/24/2023)   Total: Dose given: 4,800 cGy   Elapsed Days: 5                      Physical Examination:  Vitals: Blood pressure 146/74, pulse 61, weight 68.3 kg (150 lb 9.6 oz), SpO2 99 %, not currently breastfeeding.  Pain Score    07/24/23 1441   PainSc: 0-No pain       Restricted in physically strenuous activity but ambulatory and able to carry out work of a light or sedentary nature, e.g., light house work, office work = 1    We examined the relevant areas: yes  Findings are within the expected range for this stage of treatment: yes  -------------------------------------------------------------------------------------------------------------------    ACTION ITEMS:  Patient tolerating treatment well and as expected for this stage in their treatment and Continue radiation therapy as planned    Estimated Completion Date: 7/24/2023    Follow up in 3 months with CT Chest      Benjie Valenzuela MD  Radiation Oncology

## 2023-07-25 NOTE — PROGRESS NOTES
Radiation Treatment Summary Note      Patient Name: Kamilla Hawthorne  : 1945    Attending Provider: Benjie Valenzuela MD      Diagnosis:     ICD-10-CM ICD-9-CM   1. Lung mass  R91.8 786.6       Radiation Start Date: 2023    Radiation Completion Date: 2023      Prescription:     Site: Right Upper Lobe  Laterality: Right  Total Dose: 4800cGy  Dose per Fraction: 1200cGy  Total Fractions: 4  Daily or BID: Daily  Modality: Photon  Technique: SBRT (2-5fx)  Bolus: No    Final Delivered Dose Deviated From Initially Prescribed Dose: No    Concurrent Chemotherapy: No    Patient Tolerated Treatment Without Unexpected Side Effects/Complications: Yes    ECOG: Restricted in physically strenuous activity but ambulatory and able to carry out work of a light or sedentary nature, e.g., light house work, office work = 1    Pain Management Plan: None Indicated/PRN OTC    Follow-Up Plan: 3 months    Imaging Ordered for Follow-Up: Yes, describe: CT Chest        Benjie Valenzuela MD

## 2023-07-27 LAB
MYCOBACTERIUM SPEC CULT: NORMAL
NIGHT BLUE STAIN TISS: NORMAL

## 2023-08-01 ENCOUNTER — PATIENT OUTREACH (OUTPATIENT)
Dept: OTHER | Facility: HOSPITAL | Age: 78
End: 2023-08-01
Payer: MEDICARE

## 2023-08-10 ENCOUNTER — TELEPHONE (OUTPATIENT)
Dept: OTHER | Facility: HOSPITAL | Age: 78
End: 2023-08-10
Payer: MEDICARE

## 2023-08-10 NOTE — TELEPHONE ENCOUNTER
[facility] MULTIDISCIPLINARY CLINIC  SURVIVORSHIP SERVICES CARE COORDINATION NOTE  PHONE      Call placed to patient   RE: open referral to survivorship treatment summary visit.    Left voice mail for patient x 2.      Introduced myself and reviewed purpose and goals of survivorship treatment summary visit as well as what to expect..    Patient mailed survivorship program informational brochure.    Patient encouraged to call the office at any point for additional information, resources or support.

## 2023-08-17 ENCOUNTER — OFFICE VISIT (OUTPATIENT)
Dept: CARDIOLOGY | Facility: CLINIC | Age: 78
End: 2023-08-17
Payer: MEDICARE

## 2023-08-17 VITALS
HEIGHT: 66 IN | OXYGEN SATURATION: 97 % | HEART RATE: 63 BPM | SYSTOLIC BLOOD PRESSURE: 128 MMHG | BODY MASS INDEX: 24.49 KG/M2 | DIASTOLIC BLOOD PRESSURE: 80 MMHG | WEIGHT: 152.4 LBS

## 2023-08-17 DIAGNOSIS — I48.0 PAROXYSMAL ATRIAL FIBRILLATION: Primary | ICD-10-CM

## 2023-08-17 NOTE — PROGRESS NOTES
[facility]      Patient Name: Kamilla Hawthorne  :1945  Age: 77 y.o.  Encounter Provider:  Estuardo Lazaro Jr, MD      Chief Complaint: Hospital follow-up atrial fibrillation      HPI  Kamilla Hawthorne is a 77 y.o. female who I saw in the hospital for syncope and had a fairly normal work-up until day 3 where she was found to be in atrial fibrillation with RVR.  Spontaneous cardioversion.  Patient was placed on anticoagulation and has been doing well since hospital stay.  Unfortunately on that hospital stay she was diagnosed with breast cancer and shows to have radiation only.  She notes mild dyspnea on exertion since being started on radiation therapy.  No adjuvant chemotherapy at patient request.  She has not experienced palpitations, dizziness or syncope since leaving the hospital.  No angina.  No orthopnea, PND or edema.  Social and family history was reviewed and is not pertinent to this clinic visit.      The following portions of the patient's history were reviewed and updated as appropriate: allergies, current medications, past family history, past medical history, past social history, past surgical history and problem list.      Review of Systems   Constitutional: Negative for chills and fever.   HENT:  Negative for hoarse voice and sore throat.    Eyes:  Negative for double vision and photophobia.   Cardiovascular:  Positive for dyspnea on exertion. Negative for chest pain, leg swelling, near-syncope, orthopnea, palpitations, paroxysmal nocturnal dyspnea and syncope.   Respiratory:  Negative for cough and wheezing.    Skin:  Negative for poor wound healing and rash.   Musculoskeletal:  Negative for arthritis and joint swelling.   Gastrointestinal:  Negative for bloating, abdominal pain, hematemesis and hematochezia.   Neurological:  Negative for dizziness and focal weakness.   Psychiatric/Behavioral:  Negative for depression and suicidal ideas.      OBJECTIVE:   Vital  "Signs  Vitals:    08/17/23 1337   BP: 128/80   Pulse: 63   SpO2: 97%     Estimated body mass index is 24.6 kg/mý as calculated from the following:    Height as of this encounter: 167.6 cm (66\").    Weight as of this encounter: 69.1 kg (152 lb 6.4 oz).    Vitals reviewed.   Constitutional:       Appearance: Healthy appearance. Not in distress.   Neck:      Vascular: No JVR. JVD normal.   Pulmonary:      Effort: Pulmonary effort is normal.      Breath sounds: Normal breath sounds. No wheezing. No rhonchi. No rales.   Chest:      Chest wall: Not tender to palpatation.   Cardiovascular:      PMI at left midclavicular line. Normal rate. Regular rhythm. Normal S1. Normal S2.       Murmurs: There is no murmur.      No gallop.  No click. No rub.   Pulses:     Intact distal pulses.   Edema:     Peripheral edema absent.   Abdominal:      General: Bowel sounds are normal.      Palpations: Abdomen is soft.      Tenderness: There is no abdominal tenderness.   Musculoskeletal: Normal range of motion.         General: No tenderness. Skin:     General: Skin is warm and dry.   Neurological:      General: No focal deficit present.      Mental Status: Alert and oriented to person, place and time.       Procedures    Lipid Panel          9/13/2022    14:44 3/9/2023    11:50   Lipid Panel   Total Cholesterol 163  179    Triglycerides 63  57    HDL Cholesterol 83  87    VLDL Cholesterol 12  11    LDL Cholesterol  68  81         BUN   Date Value Ref Range Status   06/12/2023 8 8 - 23 mg/dL Final     Creatinine   Date Value Ref Range Status   06/12/2023 0.52 (L) 0.57 - 1.00 mg/dL Final     Potassium   Date Value Ref Range Status   06/14/2023 4.3 3.5 - 5.2 mmol/L Final     Comment:     Slight hemolysis detected by analyzer. Results may be affected.     ALT (SGPT)   Date Value Ref Range Status   06/12/2023 18 1 - 33 U/L Final     AST (SGOT)   Date Value Ref Range Status   06/12/2023 21 1 - 32 U/L Final           ASSESSMENT:     77-year-old " female with recently diagnosed breast cancer and hospitalization for syncope at which time she was also diagnosed with atrial fibrillation presents for hospital follow-up      PLAN OF CARE:     Syncope -possibly related to arrhythmia.  She has done well since hospital stay and had a Holter monitor showing no sustained arrhythmias.  For now we will continue low-dose metoprolol and apixaban.  No bleeding complications on oral anticoagulation.  PAF -as above  Breast cancer -radiation therapy following with radiation oncology and hematology oncology.  Hypertension    Return to clinic 6 months             Discharge Medications            Accurate as of August 17, 2023  1:40 PM. If you have any questions, ask your nurse or doctor.                Continue These Medications        Instructions Start Date   acetaminophen 500 MG tablet  Commonly known as: TYLENOL   500 mg, Oral, Every 6 Hours PRN      amLODIPine 2.5 MG tablet  Commonly known as: NORVASC   2.5 mg, Oral, Every 24 Hours Scheduled, 2.5 MG DAILY      apixaban 5 MG tablet tablet  Commonly known as: ELIQUIS   5 mg, Oral, 2 Times Daily      atorvastatin 10 MG tablet  Commonly known as: LIPITOR   TAKE 1 TABLET BY MOUTH EVERY DAY      cholecalciferol 25 MCG (1000 UT) tablet  Commonly known as: VITAMIN D3   1 tablet, Oral, Daily      dorzolamide-timolol 22.3-6.8 MG/ML ophthalmic solution  Commonly known as: COSOPT   INSTILL 1 DROP BY OPHTHALMIC ROUTE 2 TIMES EVERY DAY INTO BOTH EYES      latanoprost 0.005 % ophthalmic solution  Commonly known as: XALATAN   INSTILL 1 DROP BY OPHTHALMIC ROUTE EVERY DAY BOTH EYES AT BEDTIME      metoprolol tartrate 25 MG tablet  Commonly known as: LOPRESSOR   12.5 mg, Oral, Every 12 Hours Scheduled      valsartan 80 MG tablet  Commonly known as: DIOVAN   80 mg, Oral, Every 24 Hours Scheduled      vitamin C 500 MG tablet  Commonly known as: ASCORBIC ACID   1 tablet, Oral, Daily               Thank you for allowing me to participate in the  care of your patient,      Sincerely,   Estuardo Lazaro MD  [facility]  08/17/23  13:40 EDT

## 2023-08-23 ENCOUNTER — TELEPHONE (OUTPATIENT)
Dept: INTERNAL MEDICINE | Facility: CLINIC | Age: 78
End: 2023-08-23

## 2023-08-23 NOTE — TELEPHONE ENCOUNTER
Caller: Kamilla Hawthorne    Relationship: Self    Best call back number: [phone]    What was the call regarding: PATIENT STATES THAT SHE NEEDS TO RESCHEDULE HER MAMMOGRAM. PATIENT STATES THAT SHE IS HAVING RADIATION, AND SHE DOESN'T KNOW WHETHER TO SCHEDULE HER MAMMOGRAM BEFORE HER RADIATION, OR IF SHE NEEDED TO WAIT UNTIL ALL OF HER RADIATION TREATMENTS ARE COMPLETED. PLEASE ADVISE PATIENT ASAP.

## 2023-08-24 RX ORDER — AMLODIPINE BESYLATE 2.5 MG/1
TABLET ORAL
Qty: 180 TABLET | Refills: 1 | Status: SHIPPED | OUTPATIENT
Start: 2023-08-24

## 2023-08-24 NOTE — TELEPHONE ENCOUNTER
Spoke with patients spouse advised him that it was ok to hold of on the mammogram he understood no further action needed

## 2023-09-05 DIAGNOSIS — I48.0 PAROXYSMAL ATRIAL FIBRILLATION: ICD-10-CM

## 2023-09-05 DIAGNOSIS — Z00.00 ENCOUNTER FOR PREVENTIVE HEALTH EXAMINATION: Primary | ICD-10-CM

## 2023-09-05 DIAGNOSIS — E78.2 MIXED HYPERLIPIDEMIA: ICD-10-CM

## 2023-09-18 ENCOUNTER — PATIENT OUTREACH (OUTPATIENT)
Dept: OTHER | Facility: HOSPITAL | Age: 78
End: 2023-09-18
Payer: MEDICARE

## 2023-09-18 NOTE — PROGRESS NOTES
Reviewed chart.    Called patient, s/w  then her. The patient states she is doing pretty well. She complains of fatigue although denies any other side effects.    We discussed her upcoming appts.     We again discussed integrative therapies and [facility]. Patient expressed gratitude for my support and denied any additional needs at this time. I will call after her appts; encouraged patient to call as needed.

## 2023-09-27 ENCOUNTER — PATIENT OUTREACH (OUTPATIENT)
Dept: OTHER | Facility: HOSPITAL | Age: 78
End: 2023-09-27
Payer: MEDICARE

## 2023-09-27 NOTE — PROGRESS NOTES
Call from Estuardo, patient's . Eft msg that he has an insurance question.    Called ladarius Marte that I was returning his call.  Requested cb. Feel free to leave more detailed information and I will try to answer his question

## 2023-09-28 ENCOUNTER — PATIENT OUTREACH (OUTPATIENT)
Dept: OTHER | Facility: HOSPITAL | Age: 78
End: 2023-09-28
Payer: MEDICARE

## 2023-09-28 NOTE — PROGRESS NOTES
Called patient's  again, lvm that I was returning his call. Requested cb. Feel free to leave more detailed information and I will try to answer his question

## 2023-09-29 ENCOUNTER — HOSPITAL ENCOUNTER (OUTPATIENT)
Dept: RADIATION ONCOLOGY | Facility: HOSPITAL | Age: 78
Setting detail: RADIATION/ONCOLOGY SERIES
End: 2023-09-29
Payer: MEDICARE

## 2023-09-29 ENCOUNTER — OFFICE VISIT (OUTPATIENT)
Dept: RADIATION ONCOLOGY | Facility: HOSPITAL | Age: 78
End: 2023-09-29
Payer: MEDICARE

## 2023-09-29 ENCOUNTER — TELEPHONE (OUTPATIENT)
Dept: RADIATION ONCOLOGY | Facility: HOSPITAL | Age: 78
End: 2023-09-29
Payer: MEDICARE

## 2023-09-29 VITALS
DIASTOLIC BLOOD PRESSURE: 75 MMHG | OXYGEN SATURATION: 99 % | WEIGHT: 155.6 LBS | HEART RATE: 56 BPM | SYSTOLIC BLOOD PRESSURE: 136 MMHG | BODY MASS INDEX: 25.11 KG/M2

## 2023-09-29 DIAGNOSIS — N64.59 OTHER SIGNS AND SYMPTOMS IN BREAST: ICD-10-CM

## 2023-09-29 DIAGNOSIS — R91.8 LUNG MASS: Primary | ICD-10-CM

## 2023-09-29 DIAGNOSIS — Z08 ENCOUNTER FOR FOLLOW-UP EXAMINATION AFTER COMPLETED TREATMENT FOR MALIGNANT NEOPLASM: ICD-10-CM

## 2023-09-29 PROCEDURE — G0463 HOSPITAL OUTPT CLINIC VISIT: HCPCS

## 2023-10-02 NOTE — PROGRESS NOTES
[facility] Radiation Oncology   Follow Up    Chief Complaint  Right upper lobe lung lesion with biopsy consistent with neoplastic cells        Diagnosis: Right upper lobe lung lesion with biopsy consistent with neoplastic cells     Overall Stage IB - if Primary Lung Cancer     cT2a: 3.7cm on CT Chest  cN0: per PET CT  cM0: per PET CT and MRI Brain      Radiation Completion Date: 7/24/2023        Prescription:      Site: Right Upper Lobe  Laterality: Right  Total Dose: 4800cGy  Dose per Fraction: 1200cGy  Total Fractions: 4  Daily or BID: Daily  Modality: Photon  Technique: SBRT (2-5fx)  Bolus: No      Interval History:    Kamilla Hawthorne presents for follow-up approximately 2 months after completion of SBRT for right upper lobe  lung cancer.  The patient tolerated treatment well.  She returns to clinic with complaints of ongoing fatigue that is not improving following a radiation therapy.  She denies any other new medications or diagnoses.  She denies any worsening lung symptoms.  She also reports bilateral breast soft tissue tenderness.      Imaging:      No new relevant imaging    Pathology:      No new relevant pathology    Labs:    Lab Results   Component Value Date    CREATININE 0.52 (L) 06/12/2023             Problem List:  Patient Active Problem List   Diagnosis    Essential hypertension    History of melanoma    History of adenomatous polyp of colon    Family history of colon cancer    Mixed hyperlipidemia    Syncope, unspecified syncope type    Lung mass    Paroxysmal atrial fibrillation          Medications:  Current Outpatient Medications on File Prior to Visit   Medication Sig Dispense Refill    acetaminophen (TYLENOL) 500 MG tablet Take 1 tablet by mouth Every 6 (Six) Hours As Needed. Indications: Pain      amLODIPine (NORVASC) 2.5 MG tablet TAKE 2 TABLETS BY MOUTH DAILY FOR HIGH BLOOD PRESSURE DISORDER 180 tablet 1    apixaban (ELIQUIS) 5 MG tablet tablet Take 1 tablet by mouth 2 (Two) Times a Day.  "Indications: Atrial Fibrillation 180 tablet 1    atorvastatin (LIPITOR) 10 MG tablet TAKE 1 TABLET BY MOUTH EVERY DAY 90 tablet 1    cholecalciferol (VITAMIN D3) 1000 units tablet Take 1 tablet by mouth Daily. Indications: Vitamin D Deficiency      dorzolamide-timolol (COSOPT) 22.3-6.8 MG/ML ophthalmic solution INSTILL 1 DROP BY OPHTHALMIC ROUTE 2 TIMES EVERY DAY INTO BOTH EYES  6    latanoprost (XALATAN) 0.005 % ophthalmic solution INSTILL 1 DROP BY OPHTHALMIC ROUTE EVERY DAY BOTH EYES AT BEDTIME  6    metoprolol tartrate (LOPRESSOR) 25 MG tablet Take 0.5 tablets by mouth Every 12 (Twelve) Hours. Indications: High Blood Pressure Disorder 90 tablet 1    valsartan (DIOVAN) 80 MG tablet Take 1 tablet by mouth Daily. Indications: High Blood Pressure Disorder 90 tablet 1    vitamin C (ASCORBIC ACID) 500 MG tablet Take 1 tablet by mouth Daily. Indications: Inadequate Vitamin C       No current facility-administered medications on file prior to visit.          Allergies:  Allergies   Allergen Reactions    Penicillins Itching           Vital Signs:  /75   Pulse 56   Wt 70.6 kg (155 lb 9.6 oz)   SpO2 99%   BMI 25.11 kg/m²   Estimated body mass index is 25.11 kg/m² as calculated from the following:    Height as of 8/17/23: 167.6 cm (66\").    Weight as of this encounter: 70.6 kg (155 lb 9.6 oz).  Pain Score    09/29/23 1136   PainSc: 8  Comment: achy   PainLoc: Breast         ECOG: Ambulatory and capable of all selfcare but unable to carry out any work activities; up and about more than 50% of waking hours = 2    Physical Exam  Vitals reviewed.   Constitutional:       General: She is not in acute distress.     Appearance: Normal appearance.   HENT:      Head: Normocephalic and atraumatic.   Eyes:      Extraocular Movements: Extraocular movements intact.      Pupils: Pupils are equal, round, and reactive to light.   Pulmonary:      Effort: Pulmonary effort is normal.   Abdominal:      General: Abdomen is flat.      " Palpations: Abdomen is soft.   Musculoskeletal:      Cervical back: Normal range of motion.   Skin:     General: Skin is warm and dry.   Neurological:      General: No focal deficit present.      Mental Status: She is alert and oriented to person, place, and time.   Psychiatric:         Mood and Affect: Mood normal.         Behavior: Behavior normal.        Result Review :  The following data was reviewed by: Benjie Valenzuela MD on 09/29/2023:  Labs: Last Creatinine            Diagnoses and all orders for this visit:    1. Lung mass (Primary)        Assessment:    Kamilla Hawthorne presents for follow-up approximately 2 months after completion of SBRT for right upper lobe  lung cancer.  The patient tolerated treatment well.  She returns to clinic with complaints of ongoing fatigue that is not improving following a radiation therapy.  She denies any other new medications or diagnoses.  She denies any worsening lung symptoms.    I discussed the patient's symptoms in detail.  We will evaluate with basic blood work including CBC, BMP, thyroid.  The patient has CT chest surveillance imaging scheduled for next month.  Uncertain about the etiology of her bilateral breast tenderness.  Patient is overdue for her mammogram and this has been ordered as well.      Plan:    - CBC, BMP, thyroid labs ordered  - Mammogram ordered  - CT chest surveillance already ordered  - Patient can follow-up with me after completion of the studies       I spent 30 minutes caring for Kamilla on this date of service. This time includes time spent by me in the following activities:preparing for the visit, reviewing tests, obtaining and/or reviewing a separately obtained history, documenting information in the medical record, independently interpreting results and communicating that information with the patient/family/caregiver, and care coordination  Follow Up   No follow-ups on file.  Patient was given instructions and counseling regarding her condition  or for health maintenance advice. Please see specific information pulled into the AVS if appropriate.     Benjie Valenzuela MD

## 2023-10-04 ENCOUNTER — OFFICE VISIT (OUTPATIENT)
Dept: INTERNAL MEDICINE | Facility: CLINIC | Age: 78
End: 2023-10-04
Payer: MEDICARE

## 2023-10-04 ENCOUNTER — TELEPHONE (OUTPATIENT)
Dept: URGENT CARE | Facility: CLINIC | Age: 78
End: 2023-10-04
Payer: MEDICARE

## 2023-10-04 VITALS
WEIGHT: 160 LBS | BODY MASS INDEX: 25.71 KG/M2 | DIASTOLIC BLOOD PRESSURE: 62 MMHG | RESPIRATION RATE: 16 BRPM | OXYGEN SATURATION: 98 % | HEIGHT: 66 IN | TEMPERATURE: 97.8 F | HEART RATE: 50 BPM | SYSTOLIC BLOOD PRESSURE: 114 MMHG

## 2023-10-04 DIAGNOSIS — Z00.00 ENCOUNTER FOR PREVENTIVE HEALTH EXAMINATION: Primary | ICD-10-CM

## 2023-10-04 DIAGNOSIS — I48.0 PAROXYSMAL ATRIAL FIBRILLATION: ICD-10-CM

## 2023-10-04 DIAGNOSIS — E78.2 MIXED HYPERLIPIDEMIA: Chronic | ICD-10-CM

## 2023-10-04 DIAGNOSIS — I10 ESSENTIAL HYPERTENSION: Chronic | ICD-10-CM

## 2023-10-04 PROBLEM — F09 COGNITIVE DYSFUNCTION: Status: ACTIVE | Noted: 2023-10-04

## 2023-10-04 LAB
CLARITY, POC: CLEAR
COLOR UR: YELLOW
EXPIRATION DATE: ABNORMAL
GLUCOSE UR STRIP-MCNC: NEGATIVE MG/DL
Lab: ABNORMAL
PH UR: 6 [PH] (ref 5–8)
PROT UR STRIP-MCNC: NEGATIVE MG/DL
RBC # UR STRIP: ABNORMAL /UL
SP GR UR: 1.01 (ref 1–1.03)

## 2023-10-04 NOTE — PROGRESS NOTES
Subjective   Kamilla Hawthorne is a 77 y.o. female.     Chief Complaint   Patient presents with    Medicare Wellness-subsequent    Annual Exam         History of Present Illness  In today for annual preventive exam.  Sleep is good.  Gets 8-9 hours per night.  Interrupted.  Exercises 7 days/week mainly walking.  Energy is good.  Diet is well-balanced.  She has had 3 syncopal episodes or near syncopal episodes in the past 3 years.  The most recent was August 2021.  None in the past year.    Hyperlipidemia  This is a chronic problem. The current episode started more than 1 year ago. Pertinent negatives include no chest pain, myalgias or shortness of breath.   Hypertension  This is a chronic problem. The current episode started more than 1 year ago. Pertinent negatives include no chest pain, headaches, palpitations or shortness of breath.      The following portions of the patient's history were reviewed and updated as appropriate: allergies, current medications, past social history and problem list.    Outpatient Medications Marked as Taking for the 10/4/23 encounter (Office Visit) with Ishmael Medellin MD   Medication Sig Dispense Refill    acetaminophen (TYLENOL) 500 MG tablet Take 1 tablet by mouth Every 6 (Six) Hours As Needed. Indications: Pain      amLODIPine (NORVASC) 2.5 MG tablet TAKE 2 TABLETS BY MOUTH DAILY FOR HIGH BLOOD PRESSURE DISORDER 180 tablet 1    apixaban (ELIQUIS) 5 MG tablet tablet Take 1 tablet by mouth 2 (Two) Times a Day. Indications: Atrial Fibrillation 180 tablet 1    atorvastatin (LIPITOR) 10 MG tablet TAKE 1 TABLET BY MOUTH EVERY DAY 90 tablet 1    cholecalciferol (VITAMIN D3) 1000 units tablet Take 1 tablet by mouth Daily. Indications: Vitamin D Deficiency      dorzolamide-timolol (COSOPT) 22.3-6.8 MG/ML ophthalmic solution INSTILL 1 DROP BY OPHTHALMIC ROUTE 2 TIMES EVERY DAY INTO BOTH EYES  6    latanoprost (XALATAN) 0.005 % ophthalmic solution INSTILL 1 DROP BY OPHTHALMIC ROUTE EVERY  DAY BOTH EYES AT BEDTIME  6    metoprolol tartrate (LOPRESSOR) 25 MG tablet Take 0.5 tablets by mouth Every 12 (Twelve) Hours. Indications: High Blood Pressure Disorder 90 tablet 1    valsartan (DIOVAN) 80 MG tablet Take 1 tablet by mouth Daily. Indications: High Blood Pressure Disorder 90 tablet 1    vitamin C (ASCORBIC ACID) 500 MG tablet Take 1 tablet by mouth Daily. Indications: Inadequate Vitamin C         Review of Systems   Constitutional:  Negative for appetite change, chills, diaphoresis, fatigue, fever and unexpected weight change.   HENT:  Positive for hearing loss.    Respiratory:  Negative for cough, chest tightness, shortness of breath and wheezing.    Cardiovascular:  Negative for chest pain, palpitations and leg swelling.   Gastrointestinal:  Negative for abdominal pain, anal bleeding, blood in stool, constipation, diarrhea, nausea, rectal pain and vomiting.   Endocrine: Negative for cold intolerance, heat intolerance and polyuria.   Genitourinary:  Negative for difficulty urinating, dysuria, flank pain, frequency, hematuria and urgency.   Musculoskeletal:  Negative for arthralgias, back pain and myalgias.   Allergic/Immunologic: Negative for environmental allergies.   Neurological:  Negative for dizziness, syncope, speech difficulty, weakness, light-headedness, numbness and headaches.   Hematological:  Bruises/bleeds easily.   Psychiatric/Behavioral:  Negative for agitation, confusion, dysphoric mood and sleep disturbance. The patient is not nervous/anxious.      Objective   Vitals:    10/04/23 0754   BP: 114/62   Pulse: 50   Resp: 16   Temp: 97.8 °F (36.6 °C)   SpO2: 98%      Wt Readings from Last 3 Encounters:   10/04/23 72.6 kg (160 lb)   09/29/23 70.6 kg (155 lb 9.6 oz)   08/17/23 69.1 kg (152 lb 6.4 oz)    Body mass index is 25.84 kg/m².      Physical Exam  Vitals and nursing note reviewed.   Constitutional:       Appearance: Normal appearance. She is well-developed.   HENT:      Right Ear:  Tympanic membrane and external ear normal.      Left Ear: Tympanic membrane and external ear normal.      Nose: Nose normal.   Eyes:      Extraocular Movements: Extraocular movements intact.      Conjunctiva/sclera: Conjunctivae normal.      Pupils: Pupils are equal, round, and reactive to light.   Neck:      Thyroid: No thyromegaly.      Vascular: No JVD.   Cardiovascular:      Rate and Rhythm: Normal rate and regular rhythm.      Heart sounds: Normal heart sounds. No murmur heard.    No gallop.   Pulmonary:      Effort: Pulmonary effort is normal. No respiratory distress.      Breath sounds: Normal breath sounds. No wheezing or rales.   Abdominal:      General: Bowel sounds are normal. There is no distension.      Palpations: Abdomen is soft. There is no mass.      Tenderness: There is no abdominal tenderness. There is no guarding.      Hernia: No hernia is present.   Musculoskeletal:         General: Normal range of motion.      Cervical back: Normal range of motion and neck supple.   Lymphadenopathy:      Cervical: No cervical adenopathy.   Skin:     General: Skin is warm and dry.   Neurological:      General: No focal deficit present.      Mental Status: She is alert and oriented to person, place, and time.      Cranial Nerves: No cranial nerve deficit.      Coordination: Coordination normal.      Deep Tendon Reflexes: Reflexes normal.   Psychiatric:         Mood and Affect: Mood normal.         Behavior: Behavior normal.         Thought Content: Thought content normal.         Judgment: Judgment normal.     Procedures      Problems Addressed this Visit          Cardiac and Vasculature    Mixed hyperlipidemia (Chronic)    Essential hypertension (Chronic)    Paroxysmal atrial fibrillation     Other Visit Diagnoses       Encounter for preventive health examination    -  Primary          Diagnoses         Codes Comments    Encounter for preventive health examination    -  Primary ICD-10-CM: Z00.00  ICD-9-CM: V70.0      Essential hypertension     ICD-10-CM: I10  ICD-9-CM: 401.9     Mixed hyperlipidemia     ICD-10-CM: E78.2  ICD-9-CM: 272.2     Paroxysmal atrial fibrillation     ICD-10-CM: I48.0  ICD-9-CM: 427.31           Assessment & Plan   In today for annual preventive exam and annual wellness visit October 2023.  She has a history of hypertension, hyperlipidemia and PAF.  Likely lung cancer with a PET avid mass in the right upper lung.  She has either cognitive dysfunction or hearing loss or both as she has trouble with answering questions and following directions today.  Previously declined biopsy.  Treated with radiation therapy.  Family history of colon cancer and personal history of adenomatous colon polyps.  Distant history of malignant melanoma.  She relates 3 near syncopal episodes between 2019 and 2021.  They sound like pretty typical vasovagal episodes.  We checked a TTE and a 24-hour Holter.  It does not appear that dehydration was an issue.  I suppose she might of accidentally overtaken her medicine.  She is due for comprehensive lab work June 2023 including a CBC, CMP, TSH, free T4.  We will plan to monitor at 6-month intervals with lipids every 6 months.      Prevention counseling was performed today. The counseling performed was routine health maintenance topics including BMI and exercise.    The above information was reviewed again today 10/04/23.  It continues to be accurate as reflected above and is unchanged.  History, physical and review of systems all reviewed and are unchanged.  Medications were reviewed today and continue the current dosing.         Dragon disclaimer:   Much of this encounter note is an electronic transcription/translation of spoken language to printed text. The electronic translation of spoken language may permit erroneous, or at times, nonsensical words or phrases to be inadvertently transcribed; Although I have reviewed the note for such errors, some may still exist.

## 2023-10-04 NOTE — PROGRESS NOTES
The ABCs of the Annual Wellness Visit  Subsequent Medicare Wellness Visit    Subjective      Kamilla Hawthorne is a 77 y.o. female who presents for a Subsequent Medicare Wellness Visit.    The following portions of the patient's history were reviewed and   updated as appropriate: allergies, current medications, past family history, past medical history, past social history, past surgical history, and problem list.    Compared to one year ago, the patient feels her physical   health is worse.    Compared to one year ago, the patient feels her mental   health is the same.    Recent Hospitalizations:  This patient has had a [facility] admission record on file within the last 365 days.    Current Medical Providers:  Patient Care Team:  Ishmael Medellin MD as PCP - General (Internal Medicine)  Ishmael Lozano Jr., MD (Dermatology)  Suhail Rothman MD as Consulting Physician (Gastroenterology)  Jeanie Rivera RN as Nurse Navigator  Eros Shah MD as Referring Physician (Thoracic Surgery)  Benjie Valenzuela MD as Consulting Physician (Radiation Oncology)  Jose Decker MD as Consulting Physician (Hematology and Oncology)    Outpatient Medications Prior to Visit   Medication Sig Dispense Refill    acetaminophen (TYLENOL) 500 MG tablet Take 1 tablet by mouth Every 6 (Six) Hours As Needed. Indications: Pain      amLODIPine (NORVASC) 2.5 MG tablet TAKE 2 TABLETS BY MOUTH DAILY FOR HIGH BLOOD PRESSURE DISORDER 180 tablet 1    apixaban (ELIQUIS) 5 MG tablet tablet Take 1 tablet by mouth 2 (Two) Times a Day. Indications: Atrial Fibrillation 180 tablet 1    atorvastatin (LIPITOR) 10 MG tablet TAKE 1 TABLET BY MOUTH EVERY DAY 90 tablet 1    cholecalciferol (VITAMIN D3) 1000 units tablet Take 1 tablet by mouth Daily. Indications: Vitamin D Deficiency      dorzolamide-timolol (COSOPT) 22.3-6.8 MG/ML ophthalmic solution INSTILL 1 DROP BY OPHTHALMIC ROUTE 2 TIMES EVERY DAY INTO BOTH EYES  6    latanoprost (XALATAN) 0.005 %  "ophthalmic solution INSTILL 1 DROP BY OPHTHALMIC ROUTE EVERY DAY BOTH EYES AT BEDTIME  6    metoprolol tartrate (LOPRESSOR) 25 MG tablet Take 0.5 tablets by mouth Every 12 (Twelve) Hours. Indications: High Blood Pressure Disorder 90 tablet 1    valsartan (DIOVAN) 80 MG tablet Take 1 tablet by mouth Daily. Indications: High Blood Pressure Disorder 90 tablet 1    vitamin C (ASCORBIC ACID) 500 MG tablet Take 1 tablet by mouth Daily. Indications: Inadequate Vitamin C       No facility-administered medications prior to visit.       No opioid medication identified on active medication list. I have reviewed chart for other potential  high risk medication/s and harmful drug interactions in the elderly.        Aspirin is not on active medication list.  Aspirin use is contraindicated for this patient due to: current use of Eliquis.  .    Patient Active Problem List   Diagnosis    Essential hypertension    History of melanoma    History of adenomatous polyp of colon    Family history of colon cancer    Mixed hyperlipidemia    Syncope, unspecified syncope type    Lung mass    Paroxysmal atrial fibrillation     Advance Care Planning   Advance Care Planning     Advance Directive is not on file.  ACP discussion was held with the patient during this visit. Patient has an advance directive (not in EMR), copy requested.     Objective    Vitals:    10/04/23 0754   BP: 114/62   Pulse: 50   Resp: 16   Temp: 97.8 °F (36.6 °C)   TempSrc: Infrared   SpO2: 98%   Weight: 72.6 kg (160 lb)   Height: 167.6 cm (65.98\")   PainSc: 0-No pain     Estimated body mass index is 25.84 kg/m² as calculated from the following:    Height as of this encounter: 167.6 cm (65.98\").    Weight as of this encounter: 72.6 kg (160 lb).           Does the patient have evidence of cognitive impairment?   No            HEALTH RISK ASSESSMENT    Smoking Status:  Social History     Tobacco Use   Smoking Status Former    Packs/day: 1.00    Years: 20.00    Pack years: " 20.00    Types: Cigarettes    Quit date:     Years since quittin.7    Passive exposure: Past   Smokeless Tobacco Never     Alcohol Consumption:  Social History     Substance and Sexual Activity   Alcohol Use Yes    Alcohol/week: 3.0 standard drinks    Types: 3 Glasses of wine per week    Comment: SOCIAL     Fall Risk Screen:    JUSTEN Fall Risk Assessment was completed, and patient is at MODERATE risk for falls. Assessment completed on:10/4/2023    Depression Screening:      10/4/2023     7:58 AM   PHQ-2/PHQ-9 Depression Screening   Little Interest or Pleasure in Doing Things 0-->not at all   Feeling Down, Depressed or Hopeless 0-->not at all   PHQ-9: Brief Depression Severity Measure Score 0       Health Habits and Functional and Cognitive Screening:      10/4/2023     7:00 AM   Functional & Cognitive Status   Do you have difficulty preparing food and eating? No   Do you have difficulty bathing yourself, getting dressed or grooming yourself? No   Do you have difficulty using the toilet? No   Do you have difficulty moving around from place to place? No   Do you have trouble with steps or getting out of a bed or a chair? Yes   Current Diet Well Balanced Diet   Dental Exam Up to date   Eye Exam Up to date   Exercise (times per week) 7 times per week   Current Exercises Include Walking   Do you need help using the phone?  No   Are you deaf or do you have serious difficulty hearing?  Yes   Do you need help to go to places out of walking distance? No   Do you need help shopping? No   Do you need help preparing meals?  No   Do you need help with housework?  No   Do you need help with laundry? No   Do you need help taking your medications? No   Do you need help managing money? No   Do you ever drive or ride in a car without wearing a seat belt? No   Have you felt unusual stress, anger or loneliness in the last month? No   Who do you live with? Spouse   If you need help, do you have trouble finding someone  available to you? No   Have you been bothered in the last four weeks by sexual problems? No   Do you have difficulty concentrating, remembering or making decisions? No       Age-appropriate Screening Schedule:  Refer to the list below for future screening recommendations based on patient's age, sex and/or medical conditions. Orders for these recommended tests are listed in the plan section. The patient has been provided with a written plan.    Health Maintenance   Topic Date Due    MAMMOGRAM  06/07/2023    COVID-19 Vaccine (6 - 2023-24 season) 09/01/2023    ANNUAL WELLNESS VISIT  09/13/2023    BMI FOLLOWUP  09/13/2023    DXA SCAN  08/11/2031 (Originally 8/11/2018)    LIPID PANEL  03/09/2024    COLORECTAL CANCER SCREENING  02/09/2026    TDAP/TD VACCINES (2 - Td or Tdap) 01/01/2027    HEPATITIS C SCREENING  Completed    INFLUENZA VACCINE  Completed    Pneumococcal Vaccine 65+  Completed    ZOSTER VACCINE  Completed                  CMS Preventative Services Quick Reference  Risk Factors Identified During Encounter:    Fall Risk-High or Moderate: Information on Fall Prevention Shared in After Visit Summary  Hearing Problem:  Continue to use hearing aids  Immunizations Discussed/Encouraged: COVID19    The above risks/problems have been discussed with the patient.  Pertinent information has been shared with the patient in the After Visit Summary.    Diagnoses and all orders for this visit:    1. Encounter for preventive health examination (Primary)    2. Essential hypertension    3. Mixed hyperlipidemia    4. Paroxysmal atrial fibrillation        Follow Up:   Next Medicare Wellness visit to be scheduled in 1 year.      An After Visit Summary and PPPS were made available to the patient.

## 2023-10-09 ENCOUNTER — HOSPITAL ENCOUNTER (OUTPATIENT)
Dept: CT IMAGING | Facility: HOSPITAL | Age: 78
Discharge: HOME | End: 2023-10-09
Payer: MEDICARE

## 2023-10-09 ENCOUNTER — LAB (OUTPATIENT)
Dept: LAB | Facility: HOSPITAL | Age: 78
End: 2023-10-09
Payer: MEDICARE

## 2023-10-09 DIAGNOSIS — R91.8 LUNG MASS: ICD-10-CM

## 2023-10-09 DIAGNOSIS — Z08 ENCOUNTER FOR FOLLOW-UP EXAMINATION AFTER COMPLETED TREATMENT FOR MALIGNANT NEOPLASM: ICD-10-CM

## 2023-10-09 DIAGNOSIS — R91.8 LUNG MASS: Chronic | ICD-10-CM

## 2023-10-09 LAB
ANION GAP SERPL CALCULATED.3IONS-SCNC: 10 MMOL/L (ref 5–15)
BASOPHILS # BLD AUTO: 0.05 10*3/MM3 (ref 0–0.2)
BASOPHILS NFR BLD AUTO: 0.8 % (ref 0–1.5)
BUN SERPL-MCNC: 13 MG/DL (ref 8–23)
BUN/CREAT SERPL: 21.3 (ref 7–25)
CALCIUM SPEC-SCNC: 9.4 MG/DL (ref 8.6–10.5)
CHLORIDE SERPL-SCNC: 104 MMOL/L (ref 98–107)
CO2 SERPL-SCNC: 26 MMOL/L (ref 22–29)
CREAT SERPL-MCNC: 0.61 MG/DL (ref 0.57–1)
DEPRECATED RDW RBC AUTO: 44.3 FL (ref 37–54)
EGFRCR SERPLBLD CKD-EPI 2021: 92.2 ML/MIN/1.73
EOSINOPHIL # BLD AUTO: 0.12 10*3/MM3 (ref 0–0.4)
EOSINOPHIL NFR BLD AUTO: 2 % (ref 0.3–6.2)
ERYTHROCYTE [DISTWIDTH] IN BLOOD BY AUTOMATED COUNT: 13.1 % (ref 12.3–15.4)
GLUCOSE SERPL-MCNC: 96 MG/DL (ref 65–99)
HCT VFR BLD AUTO: 42.9 % (ref 34–46.6)
HGB BLD-MCNC: 14.6 G/DL (ref 12–15.9)
IMM GRANULOCYTES # BLD AUTO: 0.03 10*3/MM3 (ref 0–0.05)
IMM GRANULOCYTES NFR BLD AUTO: 0.5 % (ref 0–0.5)
LYMPHOCYTES # BLD AUTO: 1.91 10*3/MM3 (ref 0.7–3.1)
LYMPHOCYTES NFR BLD AUTO: 31.7 % (ref 19.6–45.3)
MCH RBC QN AUTO: 31.3 PG (ref 26.6–33)
MCHC RBC AUTO-ENTMCNC: 34 G/DL (ref 31.5–35.7)
MCV RBC AUTO: 91.9 FL (ref 79–97)
MONOCYTES # BLD AUTO: 0.4 10*3/MM3 (ref 0.1–0.9)
MONOCYTES NFR BLD AUTO: 6.6 % (ref 5–12)
NEUTROPHILS NFR BLD AUTO: 3.51 10*3/MM3 (ref 1.7–7)
NEUTROPHILS NFR BLD AUTO: 58.4 % (ref 42.7–76)
NRBC BLD AUTO-RTO: 0 /100 WBC (ref 0–0.2)
PLATELET # BLD AUTO: 255 10*3/MM3 (ref 140–450)
PMV BLD AUTO: 9.1 FL (ref 6–12)
POTASSIUM SERPL-SCNC: 4.2 MMOL/L (ref 3.5–5.2)
RBC # BLD AUTO: 4.67 10*6/MM3 (ref 3.77–5.28)
SODIUM SERPL-SCNC: 140 MMOL/L (ref 136–145)
T4 FREE SERPL-MCNC: 1.24 NG/DL (ref 0.93–1.7)
TSH SERPL DL<=0.05 MIU/L-ACNC: 1.84 UIU/ML (ref 0.27–4.2)
WBC NRBC COR # BLD: 6.02 10*3/MM3 (ref 3.4–10.8)

## 2023-10-09 PROCEDURE — 84443 ASSAY THYROID STIM HORMONE: CPT

## 2023-10-09 PROCEDURE — 80048 BASIC METABOLIC PNL TOTAL CA: CPT

## 2023-10-09 PROCEDURE — 84439 ASSAY OF FREE THYROXINE: CPT

## 2023-10-09 PROCEDURE — 36415 COLL VENOUS BLD VENIPUNCTURE: CPT

## 2023-10-09 PROCEDURE — 71250 CT THORAX DX C-: CPT

## 2023-10-09 PROCEDURE — 85025 COMPLETE CBC W/AUTO DIFF WBC: CPT

## 2023-10-27 ENCOUNTER — TELEPHONE (OUTPATIENT)
Dept: RADIATION ONCOLOGY | Facility: HOSPITAL | Age: 78
End: 2023-10-27
Payer: MEDICARE

## 2023-10-27 ENCOUNTER — APPOINTMENT (OUTPATIENT)
Dept: ULTRASOUND IMAGING | Facility: HOSPITAL | Age: 78
End: 2023-10-27
Payer: MEDICARE

## 2023-10-27 ENCOUNTER — HOSPITAL ENCOUNTER (OUTPATIENT)
Dept: MAMMOGRAPHY | Facility: HOSPITAL | Age: 78
Discharge: HOME | End: 2023-10-27
Admitting: STUDENT IN AN ORGANIZED HEALTH CARE EDUCATION/TRAINING PROGRAM
Payer: MEDICARE

## 2023-10-27 DIAGNOSIS — R91.8 LUNG MASS: ICD-10-CM

## 2023-10-27 DIAGNOSIS — Z08 ENCOUNTER FOR FOLLOW-UP EXAMINATION AFTER COMPLETED TREATMENT FOR MALIGNANT NEOPLASM: ICD-10-CM

## 2023-10-27 PROCEDURE — 77066 DX MAMMO INCL CAD BI: CPT

## 2023-10-27 PROCEDURE — G0279 TOMOSYNTHESIS, MAMMO: HCPCS

## 2023-10-31 ENCOUNTER — TELEPHONE (OUTPATIENT)
Dept: RADIATION ONCOLOGY | Facility: HOSPITAL | Age: 78
End: 2023-10-31
Payer: MEDICARE

## 2023-11-01 ENCOUNTER — HOSPITAL ENCOUNTER (OUTPATIENT)
Dept: RADIATION ONCOLOGY | Facility: HOSPITAL | Age: 78
Setting detail: RADIATION/ONCOLOGY SERIES
End: 2023-11-01
Payer: MEDICARE

## 2023-11-01 ENCOUNTER — OFFICE VISIT (OUTPATIENT)
Dept: RADIATION ONCOLOGY | Facility: HOSPITAL | Age: 78
End: 2023-11-01
Payer: MEDICARE

## 2023-11-01 VITALS
HEART RATE: 55 BPM | BODY MASS INDEX: 25.45 KG/M2 | OXYGEN SATURATION: 99 % | WEIGHT: 157.6 LBS | DIASTOLIC BLOOD PRESSURE: 87 MMHG | SYSTOLIC BLOOD PRESSURE: 156 MMHG

## 2023-11-01 DIAGNOSIS — S89.92XA INJURY OF LEFT KNEE, INITIAL ENCOUNTER: ICD-10-CM

## 2023-11-01 DIAGNOSIS — R91.8 LUNG MASS: Primary | ICD-10-CM

## 2023-11-01 PROCEDURE — G0463 HOSPITAL OUTPT CLINIC VISIT: HCPCS

## 2023-11-01 NOTE — PROGRESS NOTES
[facility] Radiation Oncology   Follow Up    Chief Complaint  Right upper lobe lung lesion with biopsy consistent with neoplastic cells        Diagnosis: Right upper lobe lung lesion with biopsy consistent with neoplastic cells     Overall Stage IB - if Primary Lung Cancer     cT2a: 3.7cm on CT Chest  cN0: per PET CT  cM0: per PET CT and MRI Brain        Radiation Completion Date: 7/24/2023        Prescription:      Site: Right Upper Lobe  Laterality: Right  Total Dose: 4800cGy  Dose per Fraction: 1200cGy  Total Fractions: 4  Daily or BID: Daily  Modality: Photon  Technique: SBRT (2-5fx)  Bolus: No      Interval History:    Kamilla Hawthorne presents for regularly scheduled follow-up approximately 3 months after completion of radiation therapy for a T2a right upper lobe malignancy.  The patient tolerated treatment well and has done well in the interim with regards to her breathing.  I last saw her several weeks ago where she was complaining of new bilateral breast pain.  Diagnostic mammogram was ordered and is negative.  The patient reports that the pain has mildly improved and is intermittent in the interim.  She has no other new or concerning complaints, with the exception that she has injured her left knee in a fall and is wearing a brace.      Imaging:      Diagnostic Mammogram 10/27/2023    IMPRESSION:  1. There is no evidence for malignancy or significant change in either  breast. Clinical follow-up for the areas of bilateral breast tenderness  is recommended. If the patient's areas of tenderness return, breast  sonography can be performed at that time. Otherwise, routine followup  mammography is recommended.     BI-RADS category 1: Negative.      Pathology:      No new relevant pathology      Labs:    Lab Results   Component Value Date    CREATININE 0.61 10/09/2023               Problem List:  Patient Active Problem List   Diagnosis    Essential hypertension    History of melanoma    History of adenomatous polyp of  "colon    Family history of colon cancer    Mixed hyperlipidemia    Syncope, unspecified syncope type    Lung mass    Paroxysmal atrial fibrillation    Cognitive dysfunction          Medications:  Current Outpatient Medications on File Prior to Visit   Medication Sig Dispense Refill    acetaminophen (TYLENOL) 500 MG tablet Take 1 tablet by mouth Every 6 (Six) Hours As Needed. Indications: Pain      amLODIPine (NORVASC) 2.5 MG tablet TAKE 2 TABLETS BY MOUTH DAILY FOR HIGH BLOOD PRESSURE DISORDER 180 tablet 1    apixaban (ELIQUIS) 5 MG tablet tablet Take 1 tablet by mouth 2 (Two) Times a Day. Indications: Atrial Fibrillation 180 tablet 1    atorvastatin (LIPITOR) 10 MG tablet TAKE 1 TABLET BY MOUTH EVERY DAY 90 tablet 1    cholecalciferol (VITAMIN D3) 1000 units tablet Take 1 tablet by mouth Daily. Indications: Vitamin D Deficiency      dorzolamide-timolol (COSOPT) 22.3-6.8 MG/ML ophthalmic solution INSTILL 1 DROP BY OPHTHALMIC ROUTE 2 TIMES EVERY DAY INTO BOTH EYES  6    latanoprost (XALATAN) 0.005 % ophthalmic solution INSTILL 1 DROP BY OPHTHALMIC ROUTE EVERY DAY BOTH EYES AT BEDTIME  6    metoprolol tartrate (LOPRESSOR) 25 MG tablet Take 0.5 tablets by mouth Every 12 (Twelve) Hours. Indications: High Blood Pressure Disorder 90 tablet 1    valsartan (DIOVAN) 80 MG tablet Take 1 tablet by mouth Daily. Indications: High Blood Pressure Disorder 90 tablet 1    vitamin C (ASCORBIC ACID) 500 MG tablet Take 1 tablet by mouth Daily. Indications: Inadequate Vitamin C       No current facility-administered medications on file prior to visit.          Allergies:  Allergies   Allergen Reactions    Penicillins Itching           Vital Signs:  /87   Pulse 55   Wt 71.5 kg (157 lb 9.6 oz)   SpO2 99%   BMI 25.45 kg/m²   Estimated body mass index is 25.45 kg/m² as calculated from the following:    Height as of 10/4/23: 167.6 cm (65.98\").    Weight as of this encounter: 71.5 kg (157 lb 9.6 oz).  Pain Score    11/01/23 1310 "   PainSc: 0-No pain         ECOG: Capable of only limited selfcare; confined to bed or chair more than 50% of waking hours = 3    Physical Exam  Vitals reviewed.   Constitutional:       General: She is not in acute distress.     Appearance: Normal appearance.   HENT:      Head: Normocephalic and atraumatic.   Eyes:      Extraocular Movements: Extraocular movements intact.      Pupils: Pupils are equal, round, and reactive to light.   Pulmonary:      Effort: Pulmonary effort is normal.   Abdominal:      General: Abdomen is flat.      Palpations: Abdomen is soft.   Musculoskeletal:      Cervical back: Normal range of motion and neck supple.      Comments: Left knee brace with swelling   Skin:     General: Skin is warm and dry.   Neurological:      General: No focal deficit present.      Mental Status: She is alert and oriented to person, place, and time.   Psychiatric:         Mood and Affect: Mood normal.         Behavior: Behavior normal.          Result Review :  The following data was reviewed by: Benjie Valenzuela MD on 11/01/2023:  Labs: Last Creatinine   Data reviewed : Radiologic studies Mammogram             Diagnoses and all orders for this visit:    1. Lung mass (Primary)  -     CT Chest Without Contrast; Future  -     Ambulatory Referral to Orthopedic Surgery  -     Ambulatory Referral to Physical Therapy Evaluate and treat; Strengthening (Left Knee); Partial weight bearing    2. Injury of left knee, initial encounter        Assessment:    Kamilla Hawthorne presents for regularly scheduled follow-up approximately 3 months after completion of radiation therapy for a T2a right upper lobe malignancy.  The patient tolerated treatment well and has done well in the interim with regards to her breathing.  I last saw her several weeks ago where she was complaining of new bilateral breast pain.  Diagnostic mammogram was ordered and is negative.  The patient reports that the pain has mildly improved and is intermittent in  the interim.  She has no other new or concerning complaints, with the exception that she has injured her left knee in a fall and is wearing a brace.    At this time I am uncertain about the etiology of her bilateral breast tenderness.  Her mammogram is negative.  It is possible that radiation could injure or irritate intercostal nerves which could cause pain along the chest wall, but overall I am skeptical of this given her bilateral symptoms.  Given that her symptoms are improving we will continue to monitor.  With regards to ongoing lung surveillance she will need a new CT chest in approximately 2 months.  I offered her orthopedic/PT referral for her new knee injury after fall which they were amenable to.     Plan:    -Follow-up in approximately 2 months with repeat CT chest  -Referred to orthopedics/PT for left knee injury       I spent 30 minutes caring for Kamilla on this date of service. This time includes time spent by me in the following activities:preparing for the visit, reviewing tests, obtaining and/or reviewing a separately obtained history, documenting information in the medical record, independently interpreting results and communicating that information with the patient/family/caregiver, and care coordination  Follow Up   No follow-ups on file.  Patient was given instructions and counseling regarding her condition or for health maintenance advice. Please see specific information pulled into the AVS if appropriate.     Benjie Valenzuela MD

## 2023-11-06 ENCOUNTER — PATIENT OUTREACH (OUTPATIENT)
Dept: OTHER | Facility: HOSPITAL | Age: 78
End: 2023-11-06
Payer: MEDICARE

## 2023-11-06 NOTE — PROGRESS NOTES
Reviewed chart    Called patient. Left message that I was just touching base to see how she was doing. Wanted to know if she had any questions/concerns or resource/supportive care needs; requested cb    Call from patient's . We discussed her recent appt with Dr. Valenzuela. She will have another CT scan in 3 months. Her mammogram was negative last week.    The patient hurt her knee while at the grocery store recently. Dr. Valenzuela referred her to ortho; they meet with that provider next week.     The patient denies shortness of breath. She is eating well.    The patient has a cancer policy and needs paperwork completed. Encouraged them to contact Dr. Cowart office to completed the paperwork; they verbalized understanding.    We again discussed integrative therapies and [facility]. Patient/ expressed gratitude for my support and denied any additional needs at this time. I will call in February after her appts; encouraged patient/ to call as needed.

## 2023-11-10 NOTE — PROGRESS NOTES
New Knee      Patient: Kamilla Hawthorne        YOB: 1945    Medical Record Number: [id number]        Chief Complaints:       History of Present Illness: This is a 77-year-old female who presents complaining of left knee pain.  She states 2 weeks ago she fell onto the anterior aspect of both knees.  She is she has had significant pain since that time she does state the knee gives way sometimes she is worried she is going to fall she is using a cane occasionally and a walker occasionally as well.  Her daughter who is a physical therapist at [facility] is with her today.  She is on Eliquis and does not report a significant amount of swelling.        Allergies:   Allergies   Allergen Reactions    Penicillins Itching       Medications:   Home Medications:  Current Outpatient Medications on File Prior to Visit   Medication Sig    acetaminophen (TYLENOL) 500 MG tablet Take 1 tablet by mouth Every 6 (Six) Hours As Needed. Indications: Pain    amLODIPine (NORVASC) 2.5 MG tablet TAKE 2 TABLETS BY MOUTH DAILY FOR HIGH BLOOD PRESSURE DISORDER    apixaban (ELIQUIS) 5 MG tablet tablet Take 1 tablet by mouth 2 (Two) Times a Day. Indications: Atrial Fibrillation    atorvastatin (LIPITOR) 10 MG tablet TAKE 1 TABLET BY MOUTH EVERY DAY    cholecalciferol (VITAMIN D3) 1000 units tablet Take 1 tablet by mouth Daily. Indications: Vitamin D Deficiency    dorzolamide-timolol (COSOPT) 22.3-6.8 MG/ML ophthalmic solution INSTILL 1 DROP BY OPHTHALMIC ROUTE 2 TIMES EVERY DAY INTO BOTH EYES    latanoprost (XALATAN) 0.005 % ophthalmic solution INSTILL 1 DROP BY OPHTHALMIC ROUTE EVERY DAY BOTH EYES AT BEDTIME    metoprolol tartrate (LOPRESSOR) 25 MG tablet Take 0.5 tablets by mouth Every 12 (Twelve) Hours. Indications: High Blood Pressure Disorder    valsartan (DIOVAN) 80 MG tablet Take 1 tablet by mouth Daily. Indications: High Blood Pressure Disorder    vitamin C (ASCORBIC ACID) 500 MG tablet Take 1 tablet by mouth Daily. Indications:  Inadequate Vitamin C     No current facility-administered medications on file prior to visit.     Current Medications:  Scheduled Meds:  Continuous Infusions:No current facility-administered medications for this visit.    PRN Meds:.    Past Medical History:   Diagnosis Date    Atrial fibrillation     Colon polyp     Family history of colon cancer     Glaucoma     Hypertension     Lung cancer     Melanoma     Mixed hyperlipidemia 2021        Past Surgical History:   Procedure Laterality Date    BRONCHOSCOPY WITH ION ROBOTIC ASSIST Right 6/15/2023    Procedure: BRONCHOSCOPY WITH ION ROBOT WITH FNA, BIOPSIES AND BAL;  Surgeon: Tomas Booker MD;  Location: [facility] ENDOSCOPY;  Service: Robotics - Pulmonary;  Laterality: Right;  PRE OP - LUNG MASS  POST OP - LUNG MASS    CATARACT EXTRACTION Bilateral     w/ lense implant    COLONOSCOPY      COLONOSCOPY N/A 2019    Procedure: COLONOSCOPY into cecum and TI with cold biopsy polypectomies;  Surgeon: Suhail Rothman MD;  Location: [facility] ENDOSCOPY;  Service: Gastroenterology    COLONOSCOPY N/A 2021    Procedure: COLONOSCOPY TO CECUM/TI WITH COLD BX POLYPECTOMIES;  Surgeon: Suhail Rothman MD;  Location: [facility] ENDOSCOPY;  Service: Gastroenterology;  Laterality: N/A;  PERSONAL HX OF POLYPS, FAMILY HX OF COLON CANCER  --DIVERTICULOSIS, POLYPS, INTERNAL HEMORRHOIDS     COLONOSCOPY W/ POLYPECTOMY      LASIK Left         Social History     Occupational History    Not on file   Tobacco Use    Smoking status: Former     Packs/day: 1.00     Years: 20.00     Additional pack years: 0.00     Total pack years: 20.00     Types: Cigarettes     Quit date:      Years since quittin.8     Passive exposure: Past    Smokeless tobacco: Never   Vaping Use    Vaping Use: Never used   Substance and Sexual Activity    Alcohol use: Yes     Alcohol/week: 3.0 standard drinks of alcohol     Types: 3 Glasses of wine per week     Comment: SOCIAL    Drug use: Never    Sexual  "activity: Defer      Social History     Social History Narrative    Not on file        Family History   Problem Relation Age of Onset    Emphysema Mother     Stroke Father     Malig Hyperthermia Neg Hx              Review of Systems:     Review of Systems      Physical Exam: 77 y.o. female  General Appearance:    Alert, cooperative, in no acute distress                   Vitals:    11/13/23 1542   Temp: 97.7 °F (36.5 °C)   Weight: 72.5 kg (159 lb 12.8 oz)   Height: 167.6 cm (66\")   PainSc:   8      Patient is alert and read ×3 no acute distress appears her above-listed at height weight and age.  Affect is normal respiratory rate is normal unlabored. Heart rate regular rate rhythm, sclera, dentition and hearing are normal for the purpose of this exam.        Ortho Exam  Exam of the left knee she has no real effusion no ecchymosis she has good range of motion, her extensor mechanism is intact she does have some mild tenderness anteriorly a little bit tenderness anterior medially negative bounce home negative Krishna appears to have a stable Lumenis exam.  She does have some ecchymosis distally that appears old  Large Joint Arthrocentesis: L knee  Date/Time: 11/13/2023 4:20 PM  Consent given by: patient  Site marked: site marked  Timeout: Immediately prior to procedure a time out was called to verify the correct patient, procedure, equipment, support staff and site/side marked as required   Supporting Documentation  Indications: pain   Procedure Details  Location: knee - L knee  Preparation: Patient was prepped and draped in the usual sterile fashion  Needle gauge: 21G.  Medications administered: 1 mL methylPREDNISolone acetate 80 MG/ML; 2 mL lidocaine PF 1% 1 %  Patient tolerance: patient tolerated the procedure well with no immediate complications                 Radiology:   AP, Lateral and merchant views she does have some she does have some degenerative changes of the patellofemoral joint of the left knee  were " ordered/reviewed to evauateknee pain.  I have no comparative films she does have some chondrocalcinosis bilaterally more so on the medial side I do not appreciate an obvious fracture she has some osteopenia  Imaging Results (Most Recent)       Procedure Component Value Units Date/Time    XR Knee 3 View Left [[id number]] Resulted: 11/13/23 1508     Updated: 11/13/23 1508    Impression:      Ordering physician's impression is located in the Encounter Note dated 11/13/23. X-ray performed in the DR room.            Assessment/Plan:    Left knee pain following a fall I suspect this is more patellofemoral I do not appreciate an obvious fracture.  Also, it if she had a fracture being on Eliquis I think she would have a much different clinical exam.  Plan is to proceed with an injection as a diagnostic and therapeutic tool should she fail to respond to this we can get an MRI to rule out an occult fracture

## 2023-11-13 ENCOUNTER — OFFICE VISIT (OUTPATIENT)
Dept: ORTHOPEDIC SURGERY | Facility: CLINIC | Age: 78
End: 2023-11-13
Payer: MEDICARE

## 2023-11-13 VITALS — BODY MASS INDEX: 25.68 KG/M2 | WEIGHT: 159.8 LBS | HEIGHT: 66 IN | TEMPERATURE: 97.7 F

## 2023-11-13 DIAGNOSIS — M17.12 PRIMARY OSTEOARTHRITIS OF LEFT KNEE: ICD-10-CM

## 2023-11-13 DIAGNOSIS — R52 PAIN: Primary | ICD-10-CM

## 2023-11-13 PROCEDURE — 73562 X-RAY EXAM OF KNEE 3: CPT | Performed by: ORTHOPAEDIC SURGERY

## 2023-11-13 PROCEDURE — 20610 DRAIN/INJ JOINT/BURSA W/O US: CPT | Performed by: ORTHOPAEDIC SURGERY

## 2023-11-13 PROCEDURE — 99203 OFFICE O/P NEW LOW 30 MIN: CPT | Performed by: ORTHOPAEDIC SURGERY

## 2023-11-13 RX ADMIN — METHYLPREDNISOLONE ACETATE 1 ML: 80 INJECTION, SUSPENSION INTRA-ARTICULAR; INTRALESIONAL; INTRAMUSCULAR; SOFT TISSUE at 16:20

## 2023-11-13 RX ADMIN — LIDOCAINE HYDROCHLORIDE 2 ML: 10 INJECTION, SOLUTION EPIDURAL; INFILTRATION; INTRACAUDAL; PERINEURAL at 16:20

## 2023-11-14 RX ORDER — METHYLPREDNISOLONE ACETATE 80 MG/ML
1 INJECTION, SUSPENSION INTRA-ARTICULAR; INTRALESIONAL; INTRAMUSCULAR; SOFT TISSUE
Status: COMPLETED | OUTPATIENT
Start: 2023-11-13 | End: 2023-11-13

## 2023-11-14 RX ORDER — LIDOCAINE HYDROCHLORIDE 10 MG/ML
2 INJECTION, SOLUTION EPIDURAL; INFILTRATION; INTRACAUDAL; PERINEURAL
Status: COMPLETED | OUTPATIENT
Start: 2023-11-13 | End: 2023-11-13

## 2023-11-20 ENCOUNTER — PATIENT ROUNDING (BHMG ONLY) (OUTPATIENT)
Dept: ORTHOPEDIC SURGERY | Facility: CLINIC | Age: 78
End: 2023-11-20
Payer: MEDICARE

## 2023-11-20 NOTE — PROGRESS NOTES
A [place] Message has been sent to the patient for PATIENT ROUNDING with [facility]

## 2023-11-27 ENCOUNTER — TELEPHONE (OUTPATIENT)
Dept: ORTHOPEDIC SURGERY | Facility: CLINIC | Age: 78
End: 2023-11-27
Payer: MEDICARE

## 2023-11-27 DIAGNOSIS — M25.562 ACUTE PAIN OF LEFT KNEE: Primary | ICD-10-CM

## 2023-11-27 NOTE — TELEPHONE ENCOUNTER
Spoke with pt's ; he was informed of BÁRBARA's response and voiced understanding.  He stated she would like to have her MRI at [facility] and is aware centralized scheduling will call her to set it up.

## 2023-11-27 NOTE — TELEPHONE ENCOUNTER
Provider: GONZÁLEZ    Caller: MYESHA    Relationship to Patient: SELF    Pharmacy:     Phone Number: [phone]    Reason for Call: PT CALLING TO SEE IF SHE CAN GET AN MRI ORDERED. PREFERRED [address]     PT ALSO WONDERING IF SHE SHOULD CONTINUE WITH THE P.T. THIS WEDNESDAY OR WAIT UNTIL SHE GET THE MRI?

## 2023-11-28 ENCOUNTER — TELEPHONE (OUTPATIENT)
Dept: INTERNAL MEDICINE | Facility: CLINIC | Age: 78
End: 2023-11-28
Payer: MEDICARE

## 2023-11-28 NOTE — TELEPHONE ENCOUNTER
Patient c/o wet cough, congestion, no fever fo r 3 days. Informed spouse that prior to send a script pt needs evaluation. Referred to [facility], since there are not appts for today, since they requested Same day. Offered next telehealth appt, but did not want to wait & recommended not to either.

## 2023-12-28 ENCOUNTER — HOSPITAL ENCOUNTER (OUTPATIENT)
Dept: MRI IMAGING | Facility: HOSPITAL | Age: 78
Discharge: HOME | End: 2023-12-28
Admitting: ORTHOPAEDIC SURGERY
Payer: MEDICARE

## 2023-12-28 DIAGNOSIS — M25.562 ACUTE PAIN OF LEFT KNEE: ICD-10-CM

## 2023-12-28 PROCEDURE — 73721 MRI JNT OF LWR EXTRE W/O DYE: CPT

## 2023-12-29 ENCOUNTER — TELEPHONE (OUTPATIENT)
Dept: ORTHOPEDIC SURGERY | Facility: CLINIC | Age: 78
End: 2023-12-29
Payer: MEDICARE

## 2023-12-29 NOTE — TELEPHONE ENCOUNTER
----- Message from Sofiya Beltrán MD sent at 12/29/2023  9:35 AM EST -----  Can you let her know that she does have a lateral meniscus tear but more importantly has a lot of arthritis I do not think she would benefit from an arthroscopy treatment would include injections physical therapy activity modification I am happy to discuss further in follow-up

## 2024-01-03 ENCOUNTER — TELEPHONE (OUTPATIENT)
Dept: ORTHOPEDIC SURGERY | Facility: CLINIC | Age: 79
End: 2024-01-03
Payer: MEDICARE

## 2024-01-03 NOTE — PROGRESS NOTES
Knee MRI Follow Up      Patient: Kamilla Hawthorne        YOB: 1945            Chief Complaints: Knee pain left      History of Present Illness: The patient is here follow-up of an MRI of the knee MRI demonstrates tricompartmental OA with joint space narrowing she does have a degenerative lateral meniscus tear nothing that looks like a fracture      Physical Exam: 78 y.o. female  General Appearance:    Alert, cooperative, in no acute distress                 There were no vitals filed for this visit.     Patient is alert and read ×3 no acute distress appears her above-listed at height weight and age.  Affect is normal respiratory rate is normal unlabored. Heart rate regular rate rhythm, sclera, dentition and hearing are normal for the purpose of this exam.      Ortho Exam  physical exam the left knee she has mild effusion no overlying skin changes no lymphedema no lymphadenopathy.  She is sitting in a genu valgum position.  She has -5° of extension flexion is to 125 she has palpable tenderness laterally more than medially and crepitus with range of motion.  Her calf is soft and nontender     MRI Results: MRIs as above have reviewed and agree    Procedures      Assessment/Plan: Left knee pain she does have a lateral meniscus tear but I think most of her issue is arthritis I do not think she would do well and get much improvement with arthroscopy she did have an injection which did not give her a lot of relief I would like her to do physical therapy working on quad and core strengthening and maybe try 1 more injection in February

## 2024-01-08 RX ORDER — VALSARTAN 80 MG/1
80 TABLET ORAL DAILY
Qty: 90 TABLET | Refills: 1 | Status: SHIPPED | OUTPATIENT
Start: 2024-01-08

## 2024-01-08 RX ORDER — APIXABAN 5 MG/1
TABLET, FILM COATED ORAL
Qty: 180 TABLET | Refills: 1 | Status: SHIPPED | OUTPATIENT
Start: 2024-01-08

## 2024-01-09 ENCOUNTER — OFFICE VISIT (OUTPATIENT)
Dept: ORTHOPEDIC SURGERY | Facility: CLINIC | Age: 79
End: 2024-01-09
Payer: MEDICARE

## 2024-01-09 VITALS — WEIGHT: 155 LBS | HEIGHT: 65 IN | BODY MASS INDEX: 25.83 KG/M2 | TEMPERATURE: 97.8 F

## 2024-01-09 DIAGNOSIS — S83.282D OTHER TEAR OF LATERAL MENISCUS OF LEFT KNEE AS CURRENT INJURY, SUBSEQUENT ENCOUNTER: ICD-10-CM

## 2024-01-09 DIAGNOSIS — M17.12 PRIMARY LOCALIZED OSTEOARTHROSIS OF LEFT LOWER LEG: Primary | ICD-10-CM

## 2024-01-25 ENCOUNTER — TELEPHONE (OUTPATIENT)
Dept: INTERNAL MEDICINE | Facility: CLINIC | Age: 79
End: 2024-01-25
Payer: MEDICARE

## 2024-01-25 DIAGNOSIS — R91.8 MASS OF UPPER LOBE OF RIGHT LUNG: Primary | ICD-10-CM

## 2024-01-25 NOTE — TELEPHONE ENCOUNTER
Made a call to scheduling supervisor Nelly Bean [phone] to see if they can work on correcting this. If not, she will let me know so we can place CT Chest order, per Dr. Lacie carlton.

## 2024-01-25 NOTE — TELEPHONE ENCOUNTER
Dr. Medellin patient has a CT chest wo contrast scheduled 2/1/24. The authorization was approved but it was accidentally approved under your name instead of the ordering provider Dr Valenzuela. Would you mind if we order that CT chest under your name instead & we can forward the results to Dr Valenzuela? Please advise.    We've attempted to withdraw case & unable to now. Also, unable to change the ordering provider. Unable to get another PA approved under Dr Valenzuela, since there is already an approval for same test under your name.

## 2024-01-25 NOTE — TELEPHONE ENCOUNTER
Either way would be fine with me.  Since the test that already been scheduled I would simply leave the order under Dr. Valenzuela.  Okay to delete my order.

## 2024-01-30 ENCOUNTER — TREATMENT (OUTPATIENT)
Age: 79
End: 2024-01-30
Payer: MEDICARE

## 2024-01-30 DIAGNOSIS — Z91.81 PERSONAL HISTORY OF FALL: ICD-10-CM

## 2024-01-30 DIAGNOSIS — Z74.09 DECREASED FUNCTIONAL MOBILITY AND ENDURANCE: ICD-10-CM

## 2024-01-30 DIAGNOSIS — M25.562 ACUTE PAIN OF LEFT KNEE: Primary | ICD-10-CM

## 2024-01-30 NOTE — PROGRESS NOTES
Physical Therapy Initial Evaluation and Plan of Care  [address]  [place] [address]  P: [phone]  F: [phone]    Patient: Kamilla Hawthorne   : 1945  Diagnosis/ICD-10 Code:  Acute pain of left knee [M25.562]  Referring practitioner: Sofiya Beltrán MD  Date of Initial Visit: 2024  Today's Date: 2024  Patient seen for 1 session         Visit Diagnoses:    ICD-10-CM ICD-9-CM   1. Acute pain of left knee  M25.562 719.46   2. Personal history of fall  Z91.81 V15.88   3. Decreased functional mobility and endurance  Z74.09 780.99         Subjective Questionnaire: WOMAC: 38      Subjective Evaluation    History of Present Illness  Mechanism of injury: Pt is a 77 y/o female who presents to physical therapy with complaints of left knee pain after a fall at home ~6 months ago. Pt is accompanied by her daughter who is a physical therapist at [facility]. Pt was walking into her home from her garage and fell on to her knees when her left knee gave out. Pt is now fearful of falling. She uses her walker most of the time, but occasionally uses her cane. Her knee hurts intermittently during walking, standing, and getting out of a chair. Pt's daughter reports that her mom used to go to the grocery store and be able to push the cart on her own and drive independently, both which she hasn't done since her fall.     Pain  Current pain ratin  At worst pain ratin  Quality: dull ache  Relieving factors: rest and change in position  Aggravating factors: ambulation, prolonged positioning, squatting, standing, stairs and movement  Progression: worsening    Social Support  Patient lives at: one step to get into the garage, but her daughters homes have stairs.  Lives with: spouse    Treatments  Current treatment: physical therapy  Patient Goals  Patient goals for therapy: increased strength, independence with ADLs/IADLs, increased motion, improved balance and decreased  pain             Objective          Palpation     Additional Palpation Details  No TTP noted    Neurological Testing     Sensation     Knee   Left Knee   Intact: Light touch    Right Knee   Intact: light touch     Active Range of Motion   Left Knee   Flexion: 122 degrees   Extension: 0 degrees     Right Knee   Flexion: 130 degrees   Extension: 0 degrees     Strength/Myotome Testing     Left Knee   Flexion: 3+  Extension: 3-  Quadriceps contraction: fair    Right Knee   Flexion: 4+  Extension: 4+  Quadriceps contraction: good    Tests     Left Knee   Negative anterior Lachman, valgus stress test at 0 degrees, valgus stress test at 30 degrees, varus stress test at 0 degrees and varus stress test at 30 degrees.     Ambulation   Weight-Bearing Status   Weight-Bearing Status (Left): full weight bearing   Weight-Bearing Status (Right): full weight-bearing    Assistive device used: single point cane    Ambulation: Level Surfaces   Ambulation with assistive device: independent  Ambulation without assistive device: unable    Ambulation: Stairs   Ascend stairs: independent  Pattern: non-reciprocal  Railings: one rail  Descend stairs: independent  Pattern: non-reciprocal  Railings: one rail    Additional Stairs Ambulation Details  One step into home from garage, does not have a flight of stairs    Observational Gait   Gait: antalgic   Decreased walking speed and stride length.           Assessment & Plan       Assessment  Impairments: abnormal gait, abnormal muscle firing, abnormal or restricted ROM, activity intolerance, impaired balance, impaired physical strength, lacks appropriate home exercise program, pain with function and weight-bearing intolerance   Functional limitations: lifting, walking, pushing, uncomfortable because of pain, sitting, standing, stooping and unable to perform repetitive tasks   Assessment details: Pt is a 79 y/o female who presents to physical therapy with signs and symptoms consistent with acute L  knee pain. Pt has decreased L knee ROM only about 5 degrees limited compared to unaffected side. Initially, she had decreased left knee extension, but as she warmed up this was measured at zero degrees. In addition, pt has L LE strength deficits which limits her ability to perform her ADLs/IADLs safely and pain free. Pt has pain and difficulty performing going up and down a step into her home, ambulating, and performing sit to stand transfers. Pt would benefit from skilled physical therapy in order to address the above impairments and activity limitations outlined in this initial evaluation, in order to decrease pain, improve functional mobility, and decrease her risk for falls.    Barriers to therapy: Atrial fibrillation, Glaucoma, Hypertension, Lung cancer,Melanoma, Mixed hyperlipidemia  Prognosis: good    Goals  Plan Goals: STG 2-6 weeks    1. Decrease WOMAC score by 10 points to show improvement in overall functional activities  2. Pt will be independent in home exercise program  3. Pt will be able to ambulate with FWW for 350 feet safely and independently  4. Increase MMT for knee to 5/5 in order to be able to improve stability in gait    LTG 6-12 weeks    1. Decrease WOMAC score by 15 points from IE to show improvement in overall functional activities  2. Increase MMT for knee to 4+/5 or greater in order to be able to improve stability in transfers and gait  3. Pt will be able to step up and down a 6 inch step using one hand rail, safely and independently  4. Pt will be able to perform 5/5 sit to stand transfers safely and independently using her upper extremities for push off  5. Pt will be able to return to pushing a cart in the grocery store safely and independently    Plan  Therapy options: will be seen for skilled therapy services  Planned modality interventions: cryotherapy and thermotherapy (hydrocollator packs)  Planned therapy interventions: IADL retraining, joint mobilization, home exercise program,  gait training, functional ROM exercises, flexibility, ADL retraining, balance/weight-bearing training, manual therapy, motor coordination training, neuromuscular re-education, strengthening, stretching, therapeutic activities and transfer training  Frequency: 2x week  Duration in weeks: 12  Treatment plan discussed with: patient            Timed:         Manual Therapy:    0     mins  83122;     Therapeutic Exercise:    20     mins  60618;     Neuromuscular Shelly:    0    mins  15584;    Therapeutic Activity:     0     mins  73650;     Gait Trainin     mins  07164;     Ultrasound:     0     mins  35621;    Ionto                               0    mins   77582  Self Care                       0     mins   38292  Canalith Repos    0     mins 29639      Un-Timed:  Electrical Stimulation:    0     mins  56610 (MC );  Dry Needling     0     mins self-pay  Traction     0     mins 28482        Timed Treatment:   20   mins   Total Treatment:     50   mins          PT: Amy Rosenthal PT     License Number: [id number]  Electronically signed by Amy Rosenthal PT, 24, 1:39 PM EST    Certification Period: 2024 thru 2024  I certify that the therapy services are furnished while this patient is under my care.  The services outlined above are required by this patient, and will be reviewed every 90 days.         Physician Signature:__________________________________________________    PHYSICIAN: Sofiya Beltrán MD  NPI: [id number]                                      DATE:      Please sign and return via fax to [phone] Thank you, [facility].

## 2024-02-01 ENCOUNTER — HOSPITAL ENCOUNTER (OUTPATIENT)
Dept: CT IMAGING | Facility: HOSPITAL | Age: 79
Discharge: HOME | End: 2024-02-01
Admitting: INTERNAL MEDICINE
Payer: MEDICARE

## 2024-02-01 DIAGNOSIS — R91.8 MASS OF UPPER LOBE OF RIGHT LUNG: ICD-10-CM

## 2024-02-01 PROCEDURE — 71250 CT THORAX DX C-: CPT

## 2024-02-05 ENCOUNTER — TREATMENT (OUTPATIENT)
Age: 79
End: 2024-02-05
Payer: MEDICARE

## 2024-02-05 DIAGNOSIS — R91.8 RIGHT LOWER LOBE LUNG MASS: Primary | ICD-10-CM

## 2024-02-05 DIAGNOSIS — Z74.09 DECREASED FUNCTIONAL MOBILITY AND ENDURANCE: ICD-10-CM

## 2024-02-05 DIAGNOSIS — Z91.81 PERSONAL HISTORY OF FALL: ICD-10-CM

## 2024-02-05 DIAGNOSIS — M25.562 ACUTE PAIN OF LEFT KNEE: Primary | ICD-10-CM

## 2024-02-05 PROCEDURE — 97110 THERAPEUTIC EXERCISES: CPT | Performed by: PHYSICAL THERAPIST

## 2024-02-05 PROCEDURE — 97530 THERAPEUTIC ACTIVITIES: CPT | Performed by: PHYSICAL THERAPIST

## 2024-02-05 NOTE — PROGRESS NOTES
"Physical Therapy Daily Treatment Note  [address]  [facility] [address]  P: [phone]  F: [phone]    Patient: Kamilla Hawthorne   : 1945  Referring practitioner: Soifya Beltrán MD  Date of Initial Visit: Type: THERAPY  Noted: 2024  Today's Date: 2024  Patient seen for 2 sessions       Visit Diagnoses:    ICD-10-CM ICD-9-CM   1. Acute pain of left knee  M25.562 719.46   2. Personal history of fall  Z91.81 V15.88   3. Decreased functional mobility and endurance  Z74.09 780.99       Kamilla Hawthorne reports:     Subjective   Pt reports \"my knee is feeling okay today\".   Objective   See Exercise, Manual, and Modality Logs for complete treatment.   Demonstrates step through gait with FWW, presents to PT wearing L soft knee sleeve with patellar opening on L knee    Needs min-modA to perform supine to sit transfer at mat table  Assessment/Plan  Pt tolerated therapeutic interventions well without adverse reactions. She ambulates safely and independently throughout clinic using her FWW with SBA around turns. Pt requires CGA without AD when transitioning between interventions for safety. She benefits from verbal and tactile cueing to ensure proper exercise performance. Continue POC to decrease pain and improve balance.     Timed:         Manual Therapy:    0     mins  61814;     Therapeutic Exercise:    17     mins  20456;     Neuromuscular Shelly:    0    mins  98647;    Therapeutic Activity:     8     mins  62037;     Gait Trainin     mins  53715;     Ultrasound:     0     mins  55595;    Ionto                               0    mins   71974  Self Care                       0     mins   02916  Canalith Repos    0     mins 75785      Un-Timed:  Electrical Stimulation:    0     mins  35777 ( );  Dry Needling     0     mins self-pay  Traction     0     mins 32585      Timed Treatment:   25   mins   Total Treatment:     25   mins    Amy Rosenthal, PT  [place] License: " [id number]

## 2024-02-06 ENCOUNTER — TELEPHONE (OUTPATIENT)
Dept: RADIATION ONCOLOGY | Facility: HOSPITAL | Age: 79
End: 2024-02-06
Payer: MEDICARE

## 2024-02-06 NOTE — PROGRESS NOTES
[facility] Radiation Oncology   Follow Up    Chief Complaint  Right upper lobe lung lesion with biopsy consistent with neoplastic cells        Diagnosis: Right upper lobe lung lesion with biopsy consistent with neoplastic cells     Overall Stage IB - if Primary Lung Cancer     cT2a: 3.7cm on CT Chest  cN0: per PET CT  cM0: per PET CT and MRI Brain        Radiation Completion Date: 7/24/2023        Prescription:      Site: Right Upper Lobe  Laterality: Right  Total Dose: 4800cGy  Dose per Fraction: 1200cGy  Total Fractions: 4  Daily or BID: Daily  Modality: Photon  Technique: SBRT (2-5fx)  Bolus: No      Interval History:    Kamilla Hawthorne presents for regularly scheduled follow-up approximately 6 months after completion of radiation therapy for right upper lobe lung cancer.  The patient tolerated treatment well and has done fairly well in the interim.  She has no new or concerning complaints with regards to her breathing.  She has a torn meniscus and has primarily been following up with orthopedic surgery.  The tentative plan is for nonoperative approach at this time.      Imaging:      CT Chest 2/1/2024    IMPRESSION:  1. Increased right upper lobe postradiation change. Right upper lobe  mass is smaller.  2. New cluster of nodularity in the superior segment of the right lower  lobe is favored to be infectious/inflammatory, however follow-up chest  CT is suggested in about 3 months to evaluate for clearing       Pathology:      No new relevant pathology      Labs:    Lab Results   Component Value Date    CREATININE 0.61 10/09/2023             Problem List:  Patient Active Problem List   Diagnosis    Essential hypertension    History of melanoma    History of adenomatous polyp of colon    Family history of colon cancer    Mixed hyperlipidemia    Syncope, unspecified syncope type    Lung mass    Paroxysmal atrial fibrillation    Cognitive dysfunction          Medications:  Current Outpatient Medications on File Prior to  "Visit   Medication Sig Dispense Refill    acetaminophen (TYLENOL) 500 MG tablet Take 1 tablet by mouth Every 6 (Six) Hours As Needed. Indications: Pain      amLODIPine (NORVASC) 2.5 MG tablet TAKE 2 TABLETS BY MOUTH DAILY FOR HIGH BLOOD PRESSURE DISORDER 180 tablet 1    atorvastatin (LIPITOR) 10 MG tablet TAKE 1 TABLET BY MOUTH EVERY DAY 90 tablet 1    cholecalciferol (VITAMIN D3) 1000 units tablet Take 1 tablet by mouth Daily. Indications: Vitamin D Deficiency      dorzolamide-timolol (COSOPT) 22.3-6.8 MG/ML ophthalmic solution INSTILL 1 DROP BY OPHTHALMIC ROUTE 2 TIMES EVERY DAY INTO BOTH EYES  6    Eliquis 5 MG tablet tablet TAKE 1 TABLET BY MOUTH 2 (TWO) TIMES A DAY. INDICATIONS: ATRIAL FIBRILLATION 180 tablet 1    latanoprost (XALATAN) 0.005 % ophthalmic solution INSTILL 1 DROP BY OPHTHALMIC ROUTE EVERY DAY BOTH EYES AT BEDTIME  6    metoprolol tartrate (LOPRESSOR) 25 MG tablet Take 0.5 tablets by mouth Every 12 (Twelve) Hours. Indications: High Blood Pressure Disorder 90 tablet 1    valsartan (DIOVAN) 80 MG tablet TAKE 1 TABLET BY MOUTH DAILY FOR HIGH BLOOD PRESSURE 90 tablet 1    vitamin C (ASCORBIC ACID) 500 MG tablet Take 1 tablet by mouth Daily. Indications: Inadequate Vitamin C       No current facility-administered medications on file prior to visit.          Allergies:  Allergies   Allergen Reactions    Penicillins Itching           Vital Signs:  /78   Pulse 55   Wt 73.4 kg (161 lb 12.8 oz)   SpO2 100%   BMI 26.92 kg/m²   Estimated body mass index is 26.92 kg/m² as calculated from the following:    Height as of 1/9/24: 165.1 cm (65\").    Weight as of this encounter: 73.4 kg (161 lb 12.8 oz).  Pain Score    02/07/24 1410   PainSc:   9   PainLoc: Knee         ECOG: Ambulatory and capable of all selfcare but unable to carry out any work activities; up and about more than 50% of waking hours = 2    Physical Exam  Vitals reviewed.   Constitutional:       General: She is not in acute distress.     " Appearance: Normal appearance.   HENT:      Head: Normocephalic and atraumatic.   Eyes:      Extraocular Movements: Extraocular movements intact.      Pupils: Pupils are equal, round, and reactive to light.   Pulmonary:      Effort: Pulmonary effort is normal.   Abdominal:      General: Abdomen is flat.      Palpations: Abdomen is soft.   Musculoskeletal:      Cervical back: Normal range of motion.   Skin:     General: Skin is warm and dry.   Neurological:      General: No focal deficit present.      Mental Status: She is alert and oriented to person, place, and time.   Psychiatric:         Mood and Affect: Mood normal.         Behavior: Behavior normal.          Result Review :  The following data was reviewed by: Benjie Valenzuela MD on 02/07/2024:  Labs: Last Creatinine   Data reviewed : Radiologic studies CT Chest             Diagnoses and all orders for this visit:    1. Primary cancer of right upper lobe of lung (Primary)  -     CT Chest Without Contrast; Future        Assessment:    Kamilla Hawthorne presents for regularly scheduled follow-up approximately 6 months after completion of radiation therapy for right upper lobe lung cancer.  The patient tolerated treatment well and has done fairly well in the interim.  She has no new or concerning complaints with regards to her breathing.  She has a torn meniscus and has primarily been following up with orthopedic surgery.  The tentative plan is for nonoperative approach at this time.    I met with the patient and reviewed the results of her  recent imaging study in detail.  Overall, this demonstrates some maturing postradiation change in the right upper lobe with continued  mild decrease in size of the treated lesion.  There is also some superior right lower lobe nodularity that is favored to be benign which will need to be followed on subsequent imaging.  I will plan to see the patient back with repeat CT chest in 3 months.    Plan:    -Follow-up in 3 months with CT  chest       I spent 30 minutes caring for Kamilla on this date of service. This time includes time spent by me in the following activities:preparing for the visit, reviewing tests, obtaining and/or reviewing a separately obtained history, documenting information in the medical record, independently interpreting results and communicating that information with the patient/family/caregiver, and care coordination  Follow Up   No follow-ups on file.  Patient was given instructions and counseling regarding her condition or for health maintenance advice. Please see specific information pulled into the AVS if appropriate.     Benjie Valenzuela MD

## 2024-02-07 ENCOUNTER — OFFICE VISIT (OUTPATIENT)
Dept: RADIATION ONCOLOGY | Facility: HOSPITAL | Age: 79
End: 2024-02-07
Payer: MEDICARE

## 2024-02-07 VITALS
DIASTOLIC BLOOD PRESSURE: 78 MMHG | OXYGEN SATURATION: 100 % | HEART RATE: 55 BPM | WEIGHT: 161.8 LBS | SYSTOLIC BLOOD PRESSURE: 173 MMHG | BODY MASS INDEX: 26.92 KG/M2

## 2024-02-07 DIAGNOSIS — C34.11 PRIMARY CANCER OF RIGHT UPPER LOBE OF LUNG: Primary | ICD-10-CM

## 2024-02-08 ENCOUNTER — PATIENT OUTREACH (OUTPATIENT)
Dept: OTHER | Facility: HOSPITAL | Age: 79
End: 2024-02-08
Payer: MEDICARE

## 2024-02-08 ENCOUNTER — TREATMENT (OUTPATIENT)
Age: 79
End: 2024-02-08
Payer: MEDICARE

## 2024-02-08 DIAGNOSIS — Z91.81 PERSONAL HISTORY OF FALL: ICD-10-CM

## 2024-02-08 DIAGNOSIS — M25.562 ACUTE PAIN OF LEFT KNEE: Primary | ICD-10-CM

## 2024-02-08 DIAGNOSIS — Z74.09 DECREASED FUNCTIONAL MOBILITY AND ENDURANCE: ICD-10-CM

## 2024-02-08 NOTE — PROGRESS NOTES
Reviewed chart. Saw Dr. Valenzuela yesterday, will have CT in 3 months    Called patient, s/w . They are leaving for PT. I will call tomorrow

## 2024-02-09 ENCOUNTER — TELEPHONE (OUTPATIENT)
Dept: OTHER | Facility: HOSPITAL | Age: 79
End: 2024-02-09
Payer: MEDICARE

## 2024-02-09 ENCOUNTER — PATIENT OUTREACH (OUTPATIENT)
Dept: OTHER | Facility: HOSPITAL | Age: 79
End: 2024-02-09
Payer: MEDICARE

## 2024-02-09 NOTE — TELEPHONE ENCOUNTER
[facility] MULTIDISCIPLINARY CLINIC  SURVIVORSHIP SERVICES CARE COORDINATION NOTE  PHONE      Received message from Raul Mcdonald RN- patient has questions about survivorship treatment summary visit and would like to review these. LVM encouraging call back to discuss. Will also mail survivorship materials to patient

## 2024-02-09 NOTE — PROGRESS NOTES
Called patient. Left message that I was just touching base to see how she was doing. Wanted to know if she had any questions/concerns after her recent visit with Dr. Valenzuela or resource/supportive care needs; requested cb

## 2024-02-12 ENCOUNTER — OFFICE VISIT (OUTPATIENT)
Dept: ORTHOPEDIC SURGERY | Facility: CLINIC | Age: 79
End: 2024-02-12
Payer: MEDICARE

## 2024-02-12 ENCOUNTER — PATIENT OUTREACH (OUTPATIENT)
Dept: OTHER | Facility: HOSPITAL | Age: 79
End: 2024-02-12
Payer: MEDICARE

## 2024-02-12 VITALS — TEMPERATURE: 98.2 F | BODY MASS INDEX: 26.05 KG/M2 | WEIGHT: 162.1 LBS | HEIGHT: 66 IN

## 2024-02-12 DIAGNOSIS — M17.12 PRIMARY OSTEOARTHRITIS OF LEFT KNEE: Primary | ICD-10-CM

## 2024-02-12 RX ADMIN — LIDOCAINE HYDROCHLORIDE 2 ML: 10 INJECTION, SOLUTION EPIDURAL; INFILTRATION; INTRACAUDAL; PERINEURAL at 13:58

## 2024-02-12 RX ADMIN — METHYLPREDNISOLONE ACETATE 1 ML: 80 INJECTION, SUSPENSION INTRA-ARTICULAR; INTRALESIONAL; INTRAMUSCULAR; SOFT TISSUE at 13:58

## 2024-02-13 RX ORDER — METHYLPREDNISOLONE ACETATE 80 MG/ML
1 INJECTION, SUSPENSION INTRA-ARTICULAR; INTRALESIONAL; INTRAMUSCULAR; SOFT TISSUE
Status: COMPLETED | OUTPATIENT
Start: 2024-02-12 | End: 2024-02-12

## 2024-02-13 RX ORDER — LIDOCAINE HYDROCHLORIDE 10 MG/ML
2 INJECTION, SOLUTION EPIDURAL; INFILTRATION; INTRACAUDAL; PERINEURAL
Status: COMPLETED | OUTPATIENT
Start: 2024-02-12 | End: 2024-02-12

## 2024-02-15 ENCOUNTER — TREATMENT (OUTPATIENT)
Age: 79
End: 2024-02-15
Payer: MEDICARE

## 2024-02-15 DIAGNOSIS — Z91.81 PERSONAL HISTORY OF FALL: ICD-10-CM

## 2024-02-15 DIAGNOSIS — M25.562 ACUTE PAIN OF LEFT KNEE: Primary | ICD-10-CM

## 2024-02-15 DIAGNOSIS — Z74.09 DECREASED FUNCTIONAL MOBILITY AND ENDURANCE: ICD-10-CM

## 2024-02-21 ENCOUNTER — TREATMENT (OUTPATIENT)
Age: 79
End: 2024-02-21
Payer: MEDICARE

## 2024-02-21 DIAGNOSIS — M25.562 ACUTE PAIN OF LEFT KNEE: Primary | ICD-10-CM

## 2024-02-21 DIAGNOSIS — Z91.81 PERSONAL HISTORY OF FALL: ICD-10-CM

## 2024-02-21 DIAGNOSIS — Z74.09 DECREASED FUNCTIONAL MOBILITY AND ENDURANCE: ICD-10-CM

## 2024-02-21 NOTE — PROGRESS NOTES
Physical Therapy Daily Treatment Note  [address]  [facility] [address]  P: [phone]  F: [phone]    Patient: Kamilla Hawthorne   : 1945  Referring practitioner: Sofiya Beltrán MD  Date of Initial Visit: Type: THERAPY  Noted: 2024  Today's Date: 2024  Patient seen for 5 sessions       Visit Diagnoses:    ICD-10-CM ICD-9-CM   1. Acute pain of left knee  M25.562 719.46   2. Personal history of fall  Z91.81 V15.88   3. Decreased functional mobility and endurance  Z74.09 780.99       Kamilla Hawthorne reports:     Subjective   Pt reports that she is having no knee pain since her injection. Pt also reports she has been practicing getting up out of a chair.  Objective   See Exercise, Manual, and Modality Logs for complete treatment.   Demonstrates shuffling gait with decreased step height and length when ambulating in clinic with CGA at gait belt and no AD    Demonstrates slightly improved step length using SPC in clinic ambulation  Assessment/Plan  Kmailla requires consistent verbal cueing when ambulating in clinic with single point cane for proper usage. She intermittently picks her can up with no floor contact and takes a few steps before utilizing her cane again. She needs continued improvement in overall gait training to improve step height, length, and decrease her risk for falls.     Timed:         Manual Therapy:    0     mins  46717;     Therapeutic Exercise:    19     mins  74145;     Neuromuscular Shelly:    0    mins  32171;    Therapeutic Activity:     11     mins  74223;     Gait Trainin     mins  41480;     Ultrasound:     0     mins  45661;    Ionto                               0    mins   99215  Self Care                       0     mins   45962  Canalith Repos    0     mins 06571      Un-Timed:  Electrical Stimulation:    0     mins  82929 (MC );  Dry Needling     0     mins self-pay  Traction     0     mins 28708      Timed Treatment:   39   mins    Total Treatment:     39   mins    Amy Rosenthal, PT  [place] License: [id number]

## 2024-02-27 ENCOUNTER — TREATMENT (OUTPATIENT)
Age: 79
End: 2024-02-27
Payer: MEDICARE

## 2024-02-27 DIAGNOSIS — Z74.09 DECREASED FUNCTIONAL MOBILITY AND ENDURANCE: ICD-10-CM

## 2024-02-27 DIAGNOSIS — M25.562 ACUTE PAIN OF LEFT KNEE: Primary | ICD-10-CM

## 2024-02-27 DIAGNOSIS — Z91.81 PERSONAL HISTORY OF FALL: ICD-10-CM

## 2024-02-27 NOTE — PROGRESS NOTES
"Re-Assessment / Progress Note  [address]  [facility] [address]  P: [phone]  F: [phone]  Patient: Kamilla Hawthorne   : 1945  Diagnosis/ICD-10 Code:  Acute pain of left knee [M25.562]  Referring practitioner: Sofiya Beltrán MD  Date of Initial Visit: Episode Type: THERAPY  Noted: 2024    Today's Date: 2024  Patient seen for 6 sessions.    Visit Diagnoses:    ICD-10-CM ICD-9-CM   1. Acute pain of left knee  M25.562 719.46   2. Personal history of fall  Z91.81 V15.88   3. Decreased functional mobility and endurance  Z74.09 780.99       Subjective:   Kamilla Hawthorne reports:     Subjective Questionnaire: WOMAC: 36/96  Clinical Progress: progressing  Home Program Compliance: Yes  Treatment has included: therapeutic exercise, neuromuscular re-education, manual therapy, therapeutic activity, and gait training    Subjective   Pt reports \"I no longer have the knee pain\" Pt states \"I still can't walk without the cane\"  Objective   Active Range of Motion   Left Knee   Flexion: 124 degrees   Extension: 0 degrees      Right Knee   Flexion: 130 degrees   Extension: 0 degrees      Strength/Myotome Testing      Left Knee   Flexion: 4  Extension: 3+  Quadriceps contraction: fair     Right Knee   Flexion: 4+  Extension: 4+  Quadriceps contraction: good      Ambulation   Weight-Bearing Status   Weight-Bearing Status (Left): full weight bearing   Weight-Bearing Status (Right): full weight-bearing    Assistive device used: single point cane     Ambulation: Level Surfaces   Ambulation with assistive device: independent  Ambulation without assistive device: unable     Ambulation: Stairs   Ascend stairs: independent  Pattern: non-reciprocal  Railings: one rail  Descend stairs: independent  Pattern: non-reciprocal  Railings: one rail     Additional Stairs Ambulation Details  One step into home from garage, does not have a flight of stairs     Observational Gait   Gait: antalgic   Decreased " step length (shuffle gait) decreased toe clearance  Assessment/Plan  Pt is a 77 y/o female who has been attending physical therapy for left knee pain and balance problems for ~1 month. She initial started physical therapy ambulating with a FWW, but now she can ambulate independently with her SPC. She continues to require HHA when ambulating in clinic without assistive device for safety. She has a decreased step height and length when ambulating on a level surface, and catches her toe when performing step taps on the stair to improve her toe clearance. She no longer has knee pain during functional tasks, but has continued deficits in her left quad strength. Pt would benefit from additional skilled physical therapy in order to continue to address the above impairments and activity limitations outlined in this re-assessment in order to improve functional mobility, improve her gait, and decrease her overall risk for falls.   Atrial fibrillation, Glaucoma, Hypertension, Lung cancer,Melanoma, Mixed hyperlipidemia   Progress toward previous goals: Partially Met    Goals  Plan Goals: STG 2-6 weeks     1. Decrease WOMAC score by 10 points to show improvement in overall functional activities progressing  2. Pt will be independent in home exercise program MET  3. Pt will be able to ambulate with FWW for 350 feet safely and independently MET  4. Increase MMT for knee to 5/5 in order to be able to improve stability in gait progressing     LTG 6-12 weeks     1. Decrease WOMAC score by 15 points from IE to show improvement in overall functional activities progressing  2. Increase MMT for hip to 4+/5 or greater in order to be able to improve stability in transfers and gait NOT MET  3. Pt will be able to step up and down a 6 inch step using one hand rail, safely and independently NOT MET  4. Pt will be able to perform 5/5 sit to stand transfers safely and independently using her upper extremities for push off progressing  5. Pt will  be able to return to pushing a cart in the grocery store safely and independently NOT MET    Recommendations: Continue as planned  Timeframe: 2 months  Prognosis to achieve goals: good    PT Signature: Amy Rosenthal, PT  [place] Lic. # [id number]      Based upon review of the patient's progress and continued therapy plan, it is my medical opinion that Kamilla Hawthorne should continue physical therapy treatment at [facility] PHYSICAL THERAPY  [address]  [place] [address] [phone] ; Fax Number [phone]    Signature: __________________________________  Sofiya Beltrán MD    Manual Therapy:     0     mins  43853;  Therapeutic Exercise:     10     mins  00035;     Neuromuscular Shelly:     0    mins  72144;    Therapeutic Activity:      10     mins  27388;     Gait Trainin     mins  04927;     Ultrasound:      0     mins  85175;    Electrical Stimulation:     0     mins  39083 ( );  Dry Needling      0     mins self-pay  Traction      0     mins 92027  Canalith Repositioning    0     mins 02611      Timed Treatment:   25   mins   Total Treatment:     25   mins

## 2024-02-28 ENCOUNTER — OFFICE VISIT (OUTPATIENT)
Age: 79
End: 2024-02-28
Payer: MEDICARE

## 2024-02-28 VITALS
SYSTOLIC BLOOD PRESSURE: 150 MMHG | DIASTOLIC BLOOD PRESSURE: 88 MMHG | WEIGHT: 160 LBS | BODY MASS INDEX: 25.71 KG/M2 | HEART RATE: 47 BPM | HEIGHT: 66 IN

## 2024-02-28 DIAGNOSIS — I48.0 PAROXYSMAL ATRIAL FIBRILLATION: Primary | ICD-10-CM

## 2024-02-28 DIAGNOSIS — E78.2 MIXED HYPERLIPIDEMIA: Chronic | ICD-10-CM

## 2024-02-28 DIAGNOSIS — I10 PRIMARY HYPERTENSION: ICD-10-CM

## 2024-02-28 PROCEDURE — 3077F SYST BP >= 140 MM HG: CPT | Performed by: NURSE PRACTITIONER

## 2024-02-28 PROCEDURE — 99214 OFFICE O/P EST MOD 30 MIN: CPT | Performed by: NURSE PRACTITIONER

## 2024-02-28 PROCEDURE — 93000 ELECTROCARDIOGRAM COMPLETE: CPT | Performed by: NURSE PRACTITIONER

## 2024-02-28 PROCEDURE — 1160F RVW MEDS BY RX/DR IN RCRD: CPT | Performed by: NURSE PRACTITIONER

## 2024-02-28 PROCEDURE — 3079F DIAST BP 80-89 MM HG: CPT | Performed by: NURSE PRACTITIONER

## 2024-02-28 PROCEDURE — 1159F MED LIST DOCD IN RCRD: CPT | Performed by: NURSE PRACTITIONER

## 2024-02-28 NOTE — PROGRESS NOTES
Date of Office Visit: 24  Encounter Provider: BELLA Mata  Place of Service: [facility] CARDIOLOGY  Patient Name: Kamilla Hawthorne  :1945    Chief Complaint   Patient presents with    Follow-up    Paroxysmal atrial fibrillation   :     HPI: Kamilla Hawthorne is a 78 y.o. female  with hypertension, hyperlipidemia, pulmonary nodule, near-syncope, paroxysmal atrial fibrillation.  History of tobacco use     She is followed by Dr. Estuardo Lazaro.  I will visit with her in follow-up and have reviewed her medical record.    She was hospitalized in 2023 after having recurrent near syncope which escalated to her passing out presenting to the ER on 2023.  She was standing and suddenly felt clammy and dizzy.  She walked to the kitchen and check her blood pressure and another piece of furniture for support but she fell and hit her head.  She denied loss of consciousness or syncopal episode.  Her  came and lifted her head to a seated position and then she passed out.  They called EMS and she was unconscious for about 5 minutes reportedly.  Per EMS her blood pressure was 80 systolic and she was given IV fluid bolus.  She had elevated D-dimer and troponin of 33.  She suffered a left frontal scalp hematoma.  Is found to have a right upper lobe mass.  Bronchoscopy was scheduled.  She also had evidence of paroxysmal atrial fibrillation that was new for her.  She had no evidence of pulmonary embolism.  Echocardiogram showed normal left ventricular systolic function, normal biatrial size and no significant valve disease.  She had robotic bronchoscopy which was positive for non-small cell cancer.      She presents today for reassessment.  She is accompanied by her .  She checks her blood pressure couple times a week and it is normally 130-140 systolic.  She has no blood in the urine or stool with Eliquis twice daily.  No near-syncope or syncope or dizziness.  No  "recent falls.  She is in physical therapy for her left knee and is ambulating with a cane.            Allergies   Allergen Reactions    Penicillins Itching           Family and social history reviewed.     Review of Systems   Musculoskeletal:  Positive for arthritis and joint pain.   Gastrointestinal:  Positive for diarrhea.     All other systems were reviewed and are negative          Objective:     Vitals:    02/28/24 1325   BP: 150/88   BP Location: Left arm   Patient Position: Sitting   Pulse: (!) 47   Weight: 72.6 kg (160 lb)   Height: 167.6 cm (66\")     Body mass index is 25.82 kg/m².    PHYSICAL EXAM:  Pulmonary:      Effort: Pulmonary effort is normal.      Breath sounds: Normal breath sounds.   Cardiovascular:      Normal rate. Regular rhythm.             Date/Time: 2/28/2024 2:37 PM  Performed by: Effie Martinez APRN    Authorized by: Effie Martinez APRN  Comparison: compared with previous ECG   Similar to previous ECG  Rhythm: sinus bradycardia  Rate: bradycardic  Comments: No change            Current Outpatient Medications   Medication Sig Dispense Refill    acetaminophen (TYLENOL) 500 MG tablet Take 1 tablet by mouth Every 6 (Six) Hours As Needed. Indications: Pain      amLODIPine (NORVASC) 2.5 MG tablet TAKE 2 TABLETS BY MOUTH DAILY FOR HIGH BLOOD PRESSURE DISORDER 180 tablet 1    atorvastatin (LIPITOR) 10 MG tablet TAKE 1 TABLET BY MOUTH EVERY DAY 90 tablet 1    cholecalciferol (VITAMIN D3) 1000 units tablet Take 1 tablet by mouth Daily. Indications: Vitamin D Deficiency      dorzolamide-timolol (COSOPT) 22.3-6.8 MG/ML ophthalmic solution INSTILL 1 DROP BY OPHTHALMIC ROUTE 2 TIMES EVERY DAY INTO BOTH EYES  6    Eliquis 5 MG tablet tablet TAKE 1 TABLET BY MOUTH 2 (TWO) TIMES A DAY. INDICATIONS: ATRIAL FIBRILLATION 180 tablet 1    latanoprost (XALATAN) 0.005 % ophthalmic solution INSTILL 1 DROP BY OPHTHALMIC ROUTE EVERY DAY BOTH EYES AT BEDTIME  6    metoprolol tartrate (LOPRESSOR) 25 MG tablet Take " 0.5 tablets by mouth Every 12 (Twelve) Hours. Indications: High Blood Pressure Disorder 90 tablet 1    valsartan (DIOVAN) 80 MG tablet TAKE 1 TABLET BY MOUTH DAILY FOR HIGH BLOOD PRESSURE 90 tablet 1    vitamin C (ASCORBIC ACID) 500 MG tablet Take 1 tablet by mouth Daily. Indications: Inadequate Vitamin C       No current facility-administered medications for this visit.     Assessment:       Diagnosis Plan   1. Paroxysmal atrial fibrillation        2. Primary hypertension             Orders Placed This Encounter   Procedures    ECG 12 Lead     This order was created via procedure documentation     Order Specific Question:   Release to patient     Answer:   Routine Release [[id number]]         Plan:       1.  78-year-old female with paroxysmal atrial fibrillation.  CHADS2 Vascor of 4 she is on metoprolol tartrate 12.5 mg twice daily along with Eliquis 5 mg twice daily.   2. Hypertension blood pressure is stable.  Continue current regimen  3.  Hyperlipidemia on atorvastatin 10 mg  4.  Atherosclerotic disease involving the abdominal aorta  5.  Proximal AAA measuring 2.2 cm which is mildly enlarged but no aneurysmal dilation on ultrasound aorta June 2023  6.  Right upper lobe mass non-small cell lung cancer.  She completed radiation.  She continues to follow with radiation oncology  7.  Left knee issue-wearing a brace and in physical therapy currently.  She is ambulating with a cane  8.  Intermittent diarrhea-I have asked her to try an over-the-counter probiotic for 2 weeks to see if this helps her symptoms          It has been a pleasure to participate in this patient's care.      Thank you,  BELLA Mata      **I used Dragon to dictate this note:**

## 2024-02-29 ENCOUNTER — TREATMENT (OUTPATIENT)
Age: 79
End: 2024-02-29
Payer: MEDICARE

## 2024-02-29 DIAGNOSIS — Z91.81 PERSONAL HISTORY OF FALL: ICD-10-CM

## 2024-02-29 DIAGNOSIS — Z74.09 DECREASED FUNCTIONAL MOBILITY AND ENDURANCE: ICD-10-CM

## 2024-02-29 DIAGNOSIS — M25.562 ACUTE PAIN OF LEFT KNEE: Primary | ICD-10-CM

## 2024-03-04 ENCOUNTER — TREATMENT (OUTPATIENT)
Age: 79
End: 2024-03-04
Payer: MEDICARE

## 2024-03-04 DIAGNOSIS — M25.562 ACUTE PAIN OF LEFT KNEE: Primary | ICD-10-CM

## 2024-03-04 DIAGNOSIS — Z91.81 PERSONAL HISTORY OF FALL: ICD-10-CM

## 2024-03-04 DIAGNOSIS — Z74.09 DECREASED FUNCTIONAL MOBILITY AND ENDURANCE: ICD-10-CM

## 2024-03-04 NOTE — PROGRESS NOTES
Physical Therapy Daily Treatment Note  [address]  [facility] [address]  P: [phone]  F: [phone]    Patient: Kamilla Hawthorne   : 1945  Referring practitioner: Sofiya Beltrán MD  Date of Initial Visit: Type: THERAPY  Noted: 2024  Today's Date: 3/4/2024  Patient seen for 8 sessions       Visit Diagnoses:    ICD-10-CM ICD-9-CM   1. Acute pain of left knee  M25.562 719.46   2. Personal history of fall  Z91.81 V15.88   3. Decreased functional mobility and endurance  Z74.09 780.99       Kamilla Hawthorne reports:     Subjective   Pt reports that her knee cap was a little sore, but it has stopped   Objective   See Exercise, Manual, and Modality Logs for complete treatment.   Added: cart push with gait and turns    Progressed: 3# weight for standing hs curls and marches // bars    Assessment/Plan  Pt benefits from verbal cueing when ambulating with rolling cart to look both ways around turns or come to a stop. Pt has not returned to the grocery store since her fall and needs improvement with ambulation tasks when navigating turns. Continues to progress well with therapeutic activities. Continue POC.    Timed:         Manual Therapy:    0     mins  66704;     Therapeutic Exercise:    10     mins  26775;     Neuromuscular Shelly:    0    mins  53090;    Therapeutic Activity:     10     mins  74795;     Gait Trainin     mins  29898;     Ultrasound:     0     mins  38991;    Ionto                               0    mins   94727  Self Care                       0     mins   55664  Canalith Repos    0     mins 52449      Un-Timed:  Electrical Stimulation:    0     mins  56029 ( );  Dry Needling     0     mins self-pay  Traction     0     mins 29605      Timed Treatment:   26   mins   Total Treatment:     26   mins    Amy Rosenthal, PT  [place] License: [id number]

## 2024-03-06 ENCOUNTER — TREATMENT (OUTPATIENT)
Age: 79
End: 2024-03-06
Payer: MEDICARE

## 2024-03-06 DIAGNOSIS — Z74.09 DECREASED FUNCTIONAL MOBILITY AND ENDURANCE: ICD-10-CM

## 2024-03-06 DIAGNOSIS — M25.562 ACUTE PAIN OF LEFT KNEE: Primary | ICD-10-CM

## 2024-03-06 DIAGNOSIS — Z91.81 PERSONAL HISTORY OF FALL: ICD-10-CM

## 2024-03-06 NOTE — PROGRESS NOTES
"Physical Therapy Daily Treatment Note  [address]  [facility] [address]  P: [phone]  F: [phone]    Patient: Kamilla Hawthorne   : 1945  Referring practitioner: Sofiya Beltrán MD  Date of Initial Visit: Type: THERAPY  Noted: 2024  Today's Date: 3/6/2024  Patient seen for 9 sessions       Visit Diagnoses:    ICD-10-CM ICD-9-CM   1. Acute pain of left knee  M25.562 719.46   2. Personal history of fall  Z91.81 V15.88   3. Decreased functional mobility and endurance  Z74.09 780.99       Kamilla Hawthorne reports:     Subjective   Pt states \"my knee has been doing good, it's been doing really good\".   Objective   See Exercise, Manual, and Modality Logs for complete treatment.   Visual cue and without visual cue for step taps to improve toe clearance    Ambulates into clinic without knee sleeve and using SPC, ambulates in clinic without AD    Assessment/Plan  Ambulation throughout clinic today performed without her SPC for entirety of session. SBA and CGA provided around turns for safety. She still need improvement in her overall toe clearance L > R to decrease her risk for falls and improve independence with ADL/IADL performance. She demonstrated improvement with her left toe clearance in step taps and one set used visual cues to clear step and second set she was able to perform without visual cue. Continue to progress towards goals.     Timed:         Manual Therapy:    0     mins  37921;     Therapeutic Exercise:    11     mins  68466;     Neuromuscular Shelly:    0    mins  05026;    Therapeutic Activity:     9     mins  38985;     Gait Trainin     mins  54959;     Ultrasound:     0     mins  22920;    Ionto                               0    mins   07458  Self Care                       0     mins   99558  Canalith Repos    0     mins 76900      Un-Timed:  Electrical Stimulation:    0     mins  24281 ( );  Dry Needling     0     mins self-pay  Traction     0     " mins 29080      Timed Treatment:   26   mins   Total Treatment:     26   mins    Amy Rosenthal, PT  [place] License: [id number]

## 2024-03-11 ENCOUNTER — TREATMENT (OUTPATIENT)
Age: 79
End: 2024-03-11
Payer: MEDICARE

## 2024-03-11 DIAGNOSIS — Z91.81 PERSONAL HISTORY OF FALL: ICD-10-CM

## 2024-03-11 DIAGNOSIS — M25.562 ACUTE PAIN OF LEFT KNEE: Primary | ICD-10-CM

## 2024-03-11 DIAGNOSIS — E78.2 MIXED HYPERLIPIDEMIA: Chronic | ICD-10-CM

## 2024-03-11 DIAGNOSIS — Z74.09 DECREASED FUNCTIONAL MOBILITY AND ENDURANCE: ICD-10-CM

## 2024-03-11 RX ORDER — ATORVASTATIN CALCIUM 10 MG/1
TABLET, FILM COATED ORAL
Qty: 90 TABLET | Refills: 1 | Status: SHIPPED | OUTPATIENT
Start: 2024-03-11

## 2024-03-11 NOTE — PROGRESS NOTES
"Re-Assessment / Progress Note  [address]  [facility] [address]  P: [phone]  F: [phone]  Patient: Kamilla Hawthorne   : 1945  Diagnosis/ICD-10 Code:  Acute pain of left knee [M25.562]  Referring practitioner: Sofiya Beltrán MD  Date of Initial Visit: Episode Type: THERAPY  Noted: 2024    Today's Date: 3/11/2024  Patient seen for 10 sessions.    Visit Diagnoses:    ICD-10-CM ICD-9-CM   1. Acute pain of left knee  M25.562 719.46   2. Personal history of fall  Z91.81 V15.88   3. Decreased functional mobility and endurance  Z74.09 780.99       Subjective:   Kamilla Hawthorne reports:     Subjective Questionnaire: WOMAC: 38/96  Clinical Progress: progressing  Home Program Compliance: Yes  Treatment has included: therapeutic exercise, neuromuscular re-education, manual therapy, therapeutic activity, and gait training    Subjective   Pt reports that she feels like she can get better at sitting down in a chair without \"flopping\" and getting back up.   Objective   Active Range of Motion   Left Knee   Flexion: 124 degrees   Extension: 0 degrees      Right Knee   Flexion: 130 degrees   Extension: 0 degrees      Strength/Myotome Testing      Left Knee   Flexion: 4  Extension: 4+  Quadriceps contraction: good     Right Knee   Flexion: 4+  Extension: 4+  Quadriceps contraction: good    L hip:  Flex- 4-/5  ABD- 2+/5      Ambulation   Weight-Bearing Status   Weight-Bearing Status (Left): full weight bearing   Weight-Bearing Status (Right): full weight-bearing    Assistive device used: single point cane     Ambulation: Level Surfaces   Ambulation with assistive device: independent  Ambulation without assistive device: independent (household ambulation does not use AD)     Ambulation: Stairs   Ascend stairs: independent  Pattern: non-reciprocal  Railings: one rail  Descend stairs: independent  Pattern: non-reciprocal  Railings: one rail     Able to step up 8 inch step leading with LLE safely " and independently using one hand rail  Assessment/Plan  Pt is a 77 y/o female who has been attending physical therapy for 10 sessions for left knee pain. She has progressed very well towards her goals since beginning physical therapy. She can now tolerate resistance during manual muscle testing during knee extension, which is an improvement for her. She still needs improvement in her left quad and left hip strength to help her step up and down an increased height stair in order to improve stepping up and down into her house from the deep step in her garage. In addition, she has still not returned to walking around the grocery store with the cart. Pt would like to be able to return to this activity. She would benefit from additional skilled PT in order to continue to address the above impairments and activity limitations outlined in this re-assessment in order to improve functional mobility and independence with ADLs/IADLs. Pt is ready to decrease to one time a week due to good progress in therapy.  Progress toward previous goals: Partially Met    Goals  Plan Goals: STG 2-6 weeks     1. Decrease WOMAC score by 10 points to show improvement in overall functional activities progressing  2. Pt will be independent in home exercise program MET  3. Pt will be able to ambulate with FWW for 350 feet safely and independently MET  4. Increase MMT for knee to 5/5 in order to be able to improve stability in gait progressing     LTG 6-12 weeks     1. Decrease WOMAC score by 15 points from IE to show improvement in overall functional activities progressing  2. Increase MMT for hip to 4+/5 or greater in order to be able to improve stability in transfers and gait progressing   3. Pt will be able to step up and down a 6 inch step using one hand rail, safely and independently MET  4. Pt will be able to perform 5/5 sit to stand transfers safely and independently using her upper extremities for push off MET  5. Pt will be able to return  to pushing a cart in the grocery store safely and independently progressing        Recommendations: Continue as planned  Timeframe: 1 month  Prognosis to achieve goals: good    PT Signature: Amy Rosenthal, PT  [place] Lic. # [id number]      Based upon review of the patient's progress and continued therapy plan, it is my medical opinion that Kamilla Hawthorne should continue physical therapy treatment at [facility] PHYSICAL THERAPY  [address]  [facility] [address] [phone] ; Fax Number [phone]    Signature: __________________________________  Sofiya Beltrán MD    Manual Therapy:     0     mins  28788;  Therapeutic Exercise:     14     mins  55016;     Neuromuscular Shelly:     0    mins  54487;    Therapeutic Activity:     8      mins  96290;     Gait Trainin     mins  95143;     Ultrasound:      0     mins  29966;    Electrical Stimulation:     0     mins  56467 ( );  Dry Needling      0     mins self-pay  Traction      0     mins 25644  Canalith Repositioning    0     mins 33353      Timed Treatment:   24   mins   Total Treatment:     24   mins

## 2024-03-20 ENCOUNTER — TREATMENT (OUTPATIENT)
Age: 79
End: 2024-03-20
Payer: MEDICARE

## 2024-03-20 DIAGNOSIS — Z74.09 DECREASED FUNCTIONAL MOBILITY AND ENDURANCE: ICD-10-CM

## 2024-03-20 DIAGNOSIS — Z91.81 PERSONAL HISTORY OF FALL: ICD-10-CM

## 2024-03-20 DIAGNOSIS — M25.562 ACUTE PAIN OF LEFT KNEE: Primary | ICD-10-CM

## 2024-03-20 NOTE — PROGRESS NOTES
"Physical Therapy Daily Treatment Note  [address]  [facility] [address]  P: [phone]  F: [phone]    Patient: Kamilla Hawthorne   : 1945  Referring practitioner: Sofiya Beltrán MD  Date of Initial Visit: Type: THERAPY  Noted: 2024  Today's Date: 3/20/2024  Patient seen for 11 sessions       Visit Diagnoses:    ICD-10-CM ICD-9-CM   1. Acute pain of left knee  M25.562 719.46   2. Personal history of fall  Z91.81 V15.88   3. Decreased functional mobility and endurance  Z74.09 780.99       Kamilla Hawthorne reports:     Subjective   Pt reports that she felt like on Saturday \"that I was walking like a duck\" Pt reports that she felt like she was leaning side to side while walking. She reports she still hasn't been to the grocery store.   Objective   See Exercise, Manual, and Modality Logs for complete treatment.       Assessment/Plan  Decreased height for sit<>stand transfer, she required intermittent assistance with gait belt to perform with good stability. She did a nice job when one inch foam added and was able to perform without upper extremity support independently. Incorporated lateral step up to improve hip abductor strength to improve stability in gait and prevent lateral trunk lean over stance limb. Continue POC.     Timed:         Manual Therapy:    0     mins  72697;     Therapeutic Exercise:    25     mins  55216;     Neuromuscular Shelly:    0    mins  89303;    Therapeutic Activity:     10     mins  88989;     Gait Trainin     mins  90360;     Ultrasound:     0     mins  77896;    Ionto                               0    mins   11421  Self Care                       0     mins   24708  Canalith Repos    0     mins 55326      Un-Timed:  Electrical Stimulation:    0     mins  74848 ( );  Dry Needling     0     mins self-pay  Traction     0     mins 83313      Timed Treatment:   40   mins   Total Treatment:     40   mins    Amy Rosenthal, PT  [place] License: " [id number]

## 2024-03-21 ENCOUNTER — TELEPHONE (OUTPATIENT)
Age: 79
End: 2024-03-21
Payer: MEDICARE

## 2024-03-21 NOTE — TELEPHONE ENCOUNTER
Caller: Kamilla Hawthorne    Relationship: Self    Best call back number: [phone]       What was the call regarding: WAS RETURNING OUR CALL ABOUT CHANGING AN APPT?

## 2024-03-23 ENCOUNTER — HOSPITAL ENCOUNTER (OUTPATIENT)
Facility: HOSPITAL | Age: 79
Setting detail: OBSERVATION
Discharge: HOME | End: 2024-03-24
Attending: EMERGENCY MEDICINE | Admitting: EMERGENCY MEDICINE
Payer: MEDICARE

## 2024-03-23 ENCOUNTER — APPOINTMENT (OUTPATIENT)
Dept: GENERAL RADIOLOGY | Facility: HOSPITAL | Age: 79
End: 2024-03-23
Payer: MEDICARE

## 2024-03-23 DIAGNOSIS — I48.0 PAROXYSMAL ATRIAL FIBRILLATION: ICD-10-CM

## 2024-03-23 DIAGNOSIS — I25.84 CORONARY ARTERY CALCIFICATION: ICD-10-CM

## 2024-03-23 DIAGNOSIS — R55 SYNCOPE AND COLLAPSE: Primary | ICD-10-CM

## 2024-03-23 DIAGNOSIS — I25.10 CORONARY ARTERY CALCIFICATION: ICD-10-CM

## 2024-03-23 DIAGNOSIS — I10 ESSENTIAL HYPERTENSION: Chronic | ICD-10-CM

## 2024-03-23 LAB
ALBUMIN SERPL-MCNC: 4.2 G/DL (ref 3.5–5.2)
ALBUMIN/GLOB SERPL: 1.8 G/DL
ALP SERPL-CCNC: 100 U/L (ref 39–117)
ALT SERPL W P-5'-P-CCNC: 20 U/L (ref 1–33)
ANION GAP SERPL CALCULATED.3IONS-SCNC: 13 MMOL/L (ref 5–15)
AST SERPL-CCNC: 15 U/L (ref 1–32)
BASOPHILS # BLD AUTO: 0.07 10*3/MM3 (ref 0–0.2)
BASOPHILS NFR BLD AUTO: 1 % (ref 0–1.5)
BILIRUB SERPL-MCNC: 1 MG/DL (ref 0–1.2)
BUN SERPL-MCNC: 14 MG/DL (ref 8–23)
BUN/CREAT SERPL: 17.7 (ref 7–25)
CALCIUM SPEC-SCNC: 8.9 MG/DL (ref 8.6–10.5)
CHLORIDE SERPL-SCNC: 107 MMOL/L (ref 98–107)
CO2 SERPL-SCNC: 22 MMOL/L (ref 22–29)
CREAT SERPL-MCNC: 0.79 MG/DL (ref 0.57–1)
DEPRECATED RDW RBC AUTO: 45.5 FL (ref 37–54)
EGFRCR SERPLBLD CKD-EPI 2021: 76.7 ML/MIN/1.73
EOSINOPHIL # BLD AUTO: 0.05 10*3/MM3 (ref 0–0.4)
EOSINOPHIL NFR BLD AUTO: 0.7 % (ref 0.3–6.2)
ERYTHROCYTE [DISTWIDTH] IN BLOOD BY AUTOMATED COUNT: 13.3 % (ref 12.3–15.4)
GLOBULIN UR ELPH-MCNC: 2.4 GM/DL
GLUCOSE SERPL-MCNC: 127 MG/DL (ref 65–99)
HCT VFR BLD AUTO: 48 % (ref 34–46.6)
HGB BLD-MCNC: 15.7 G/DL (ref 12–15.9)
IMM GRANULOCYTES # BLD AUTO: 0.02 10*3/MM3 (ref 0–0.05)
IMM GRANULOCYTES NFR BLD AUTO: 0.3 % (ref 0–0.5)
LYMPHOCYTES # BLD AUTO: 2.45 10*3/MM3 (ref 0.7–3.1)
LYMPHOCYTES NFR BLD AUTO: 35.6 % (ref 19.6–45.3)
MAGNESIUM SERPL-MCNC: 1.8 MG/DL (ref 1.6–2.4)
MCH RBC QN AUTO: 30.7 PG (ref 26.6–33)
MCHC RBC AUTO-ENTMCNC: 32.7 G/DL (ref 31.5–35.7)
MCV RBC AUTO: 93.8 FL (ref 79–97)
MONOCYTES # BLD AUTO: 0.42 10*3/MM3 (ref 0.1–0.9)
MONOCYTES NFR BLD AUTO: 6.1 % (ref 5–12)
NEUTROPHILS NFR BLD AUTO: 3.88 10*3/MM3 (ref 1.7–7)
NEUTROPHILS NFR BLD AUTO: 56.3 % (ref 42.7–76)
NRBC BLD AUTO-RTO: 0 /100 WBC (ref 0–0.2)
NT-PROBNP SERPL-MCNC: 866 PG/ML (ref 0–1800)
PLATELET # BLD AUTO: 239 10*3/MM3 (ref 140–450)
PMV BLD AUTO: 9.2 FL (ref 6–12)
POTASSIUM SERPL-SCNC: 3.7 MMOL/L (ref 3.5–5.2)
PROT SERPL-MCNC: 6.6 G/DL (ref 6–8.5)
RBC # BLD AUTO: 5.12 10*6/MM3 (ref 3.77–5.28)
SODIUM SERPL-SCNC: 142 MMOL/L (ref 136–145)
TROPONIN T SERPL HS-MCNC: 23 NG/L
WBC NRBC COR # BLD AUTO: 6.89 10*3/MM3 (ref 3.4–10.8)

## 2024-03-23 PROCEDURE — G0378 HOSPITAL OBSERVATION PER HR: HCPCS

## 2024-03-23 PROCEDURE — 85025 COMPLETE CBC W/AUTO DIFF WBC: CPT | Performed by: EMERGENCY MEDICINE

## 2024-03-23 PROCEDURE — 80053 COMPREHEN METABOLIC PANEL: CPT | Performed by: EMERGENCY MEDICINE

## 2024-03-23 PROCEDURE — 71045 X-RAY EXAM CHEST 1 VIEW: CPT

## 2024-03-23 PROCEDURE — 83880 ASSAY OF NATRIURETIC PEPTIDE: CPT | Performed by: EMERGENCY MEDICINE

## 2024-03-23 PROCEDURE — 93010 ELECTROCARDIOGRAM REPORT: CPT | Performed by: INTERNAL MEDICINE

## 2024-03-23 PROCEDURE — 83735 ASSAY OF MAGNESIUM: CPT | Performed by: EMERGENCY MEDICINE

## 2024-03-23 PROCEDURE — 99285 EMERGENCY DEPT VISIT HI MDM: CPT

## 2024-03-23 PROCEDURE — 84484 ASSAY OF TROPONIN QUANT: CPT | Performed by: EMERGENCY MEDICINE

## 2024-03-23 PROCEDURE — 93005 ELECTROCARDIOGRAM TRACING: CPT | Performed by: EMERGENCY MEDICINE

## 2024-03-23 RX ORDER — SODIUM CHLORIDE 0.9 % (FLUSH) 0.9 %
10 SYRINGE (ML) INJECTION AS NEEDED
Status: DISCONTINUED | OUTPATIENT
Start: 2024-03-23 | End: 2024-03-24 | Stop reason: HOSPADM

## 2024-03-23 RX ORDER — NITROGLYCERIN 0.4 MG/1
0.4 TABLET SUBLINGUAL
Status: DISCONTINUED | OUTPATIENT
Start: 2024-03-23 | End: 2024-03-24 | Stop reason: HOSPADM

## 2024-03-23 RX ORDER — VALSARTAN 80 MG/1
80 TABLET ORAL DAILY
Status: DISCONTINUED | OUTPATIENT
Start: 2024-03-24 | End: 2024-03-24 | Stop reason: HOSPADM

## 2024-03-23 RX ORDER — AMLODIPINE BESYLATE 5 MG/1
5 TABLET ORAL
Status: DISCONTINUED | OUTPATIENT
Start: 2024-03-24 | End: 2024-03-24

## 2024-03-23 RX ORDER — ATORVASTATIN CALCIUM 10 MG/1
10 TABLET, FILM COATED ORAL DAILY
Status: DISCONTINUED | OUTPATIENT
Start: 2024-03-24 | End: 2024-03-24 | Stop reason: HOSPADM

## 2024-03-23 RX ORDER — SODIUM CHLORIDE 0.9 % (FLUSH) 0.9 %
10 SYRINGE (ML) INJECTION EVERY 12 HOURS SCHEDULED
Status: DISCONTINUED | OUTPATIENT
Start: 2024-03-23 | End: 2024-03-24 | Stop reason: HOSPADM

## 2024-03-23 RX ORDER — AMOXICILLIN 250 MG
2 CAPSULE ORAL 2 TIMES DAILY PRN
Status: DISCONTINUED | OUTPATIENT
Start: 2024-03-23 | End: 2024-03-24 | Stop reason: HOSPADM

## 2024-03-23 RX ORDER — POLYETHYLENE GLYCOL 3350 17 G/17G
17 POWDER, FOR SOLUTION ORAL DAILY PRN
Status: DISCONTINUED | OUTPATIENT
Start: 2024-03-23 | End: 2024-03-24 | Stop reason: HOSPADM

## 2024-03-23 RX ORDER — ONDANSETRON 2 MG/ML
4 INJECTION INTRAMUSCULAR; INTRAVENOUS EVERY 6 HOURS PRN
Status: DISCONTINUED | OUTPATIENT
Start: 2024-03-23 | End: 2024-03-24 | Stop reason: HOSPADM

## 2024-03-23 RX ORDER — BISACODYL 5 MG/1
5 TABLET, DELAYED RELEASE ORAL DAILY PRN
Status: DISCONTINUED | OUTPATIENT
Start: 2024-03-23 | End: 2024-03-24 | Stop reason: HOSPADM

## 2024-03-23 RX ORDER — ACETAMINOPHEN 325 MG/1
650 TABLET ORAL EVERY 4 HOURS PRN
Status: DISCONTINUED | OUTPATIENT
Start: 2024-03-23 | End: 2024-03-24 | Stop reason: HOSPADM

## 2024-03-23 RX ORDER — ONDANSETRON 4 MG/1
4 TABLET, ORALLY DISINTEGRATING ORAL EVERY 6 HOURS PRN
Status: DISCONTINUED | OUTPATIENT
Start: 2024-03-23 | End: 2024-03-24 | Stop reason: HOSPADM

## 2024-03-23 RX ORDER — BISACODYL 10 MG
10 SUPPOSITORY, RECTAL RECTAL DAILY PRN
Status: DISCONTINUED | OUTPATIENT
Start: 2024-03-23 | End: 2024-03-24 | Stop reason: HOSPADM

## 2024-03-23 RX ORDER — SODIUM CHLORIDE 9 MG/ML
40 INJECTION, SOLUTION INTRAVENOUS AS NEEDED
Status: DISCONTINUED | OUTPATIENT
Start: 2024-03-23 | End: 2024-03-24 | Stop reason: HOSPADM

## 2024-03-24 ENCOUNTER — READMISSION MANAGEMENT (OUTPATIENT)
Dept: CALL CENTER | Facility: HOSPITAL | Age: 79
End: 2024-03-24
Payer: MEDICARE

## 2024-03-24 ENCOUNTER — APPOINTMENT (OUTPATIENT)
Dept: CARDIOLOGY | Facility: HOSPITAL | Age: 79
End: 2024-03-24
Payer: MEDICARE

## 2024-03-24 VITALS
DIASTOLIC BLOOD PRESSURE: 74 MMHG | SYSTOLIC BLOOD PRESSURE: 132 MMHG | WEIGHT: 159 LBS | OXYGEN SATURATION: 97 % | HEART RATE: 62 BPM | BODY MASS INDEX: 25.55 KG/M2 | RESPIRATION RATE: 17 BRPM | TEMPERATURE: 97.7 F | HEIGHT: 66 IN

## 2024-03-24 LAB
ANION GAP SERPL CALCULATED.3IONS-SCNC: 10.4 MMOL/L (ref 5–15)
BUN SERPL-MCNC: 19 MG/DL (ref 8–23)
BUN/CREAT SERPL: 27.1 (ref 7–25)
CALCIUM SPEC-SCNC: 8.6 MG/DL (ref 8.6–10.5)
CHLORIDE SERPL-SCNC: 109 MMOL/L (ref 98–107)
CO2 SERPL-SCNC: 23.6 MMOL/L (ref 22–29)
CREAT SERPL-MCNC: 0.7 MG/DL (ref 0.57–1)
DEPRECATED RDW RBC AUTO: 48.1 FL (ref 37–54)
EGFRCR SERPLBLD CKD-EPI 2021: 88.7 ML/MIN/1.73
ERYTHROCYTE [DISTWIDTH] IN BLOOD BY AUTOMATED COUNT: 13.8 % (ref 12.3–15.4)
GEN 5 2HR TROPONIN T REFLEX: 23 NG/L
GLUCOSE SERPL-MCNC: 72 MG/DL (ref 65–99)
HCT VFR BLD AUTO: 42.6 % (ref 34–46.6)
HGB BLD-MCNC: 14.1 G/DL (ref 12–15.9)
MCH RBC QN AUTO: 31.5 PG (ref 26.6–33)
MCHC RBC AUTO-ENTMCNC: 33.1 G/DL (ref 31.5–35.7)
MCV RBC AUTO: 95.1 FL (ref 79–97)
PLATELET # BLD AUTO: 215 10*3/MM3 (ref 140–450)
PMV BLD AUTO: 9.2 FL (ref 6–12)
POTASSIUM SERPL-SCNC: 3.8 MMOL/L (ref 3.5–5.2)
RBC # BLD AUTO: 4.48 10*6/MM3 (ref 3.77–5.28)
SODIUM SERPL-SCNC: 143 MMOL/L (ref 136–145)
TROPONIN T DELTA: 0 NG/L
TROPONIN T SERPL HS-MCNC: 27 NG/L
WBC NRBC COR # BLD AUTO: 7.27 10*3/MM3 (ref 3.4–10.8)

## 2024-03-24 PROCEDURE — 84484 ASSAY OF TROPONIN QUANT: CPT

## 2024-03-24 PROCEDURE — 93246 EXT ECG>7D<15D RECORDING: CPT

## 2024-03-24 PROCEDURE — G0378 HOSPITAL OBSERVATION PER HR: HCPCS

## 2024-03-24 PROCEDURE — 85027 COMPLETE CBC AUTOMATED: CPT

## 2024-03-24 PROCEDURE — 80048 BASIC METABOLIC PNL TOTAL CA: CPT

## 2024-03-24 PROCEDURE — 99214 OFFICE O/P EST MOD 30 MIN: CPT | Performed by: INTERNAL MEDICINE

## 2024-03-24 RX ORDER — AMLODIPINE BESYLATE 5 MG/1
2.5 TABLET ORAL NIGHTLY
Status: DISCONTINUED | OUTPATIENT
Start: 2024-03-24 | End: 2024-03-24 | Stop reason: HOSPADM

## 2024-03-24 RX ORDER — AMLODIPINE BESYLATE 2.5 MG/1
2.5 TABLET ORAL NIGHTLY
Start: 2024-03-24 | End: 2024-04-08

## 2024-03-24 RX ADMIN — METOPROLOL TARTRATE 12.5 MG: 25 TABLET, FILM COATED ORAL at 00:00

## 2024-03-24 RX ADMIN — Medication 10 ML: at 00:30

## 2024-03-24 RX ADMIN — APIXABAN 5 MG: 5 TABLET, FILM COATED ORAL at 00:00

## 2024-03-24 NOTE — ED TRIAGE NOTES
Pt was brought in by ems from home for syncope. Pt was sitting at table when she became unresponsive. Upon fire arrival she was still unresponsive. When ems arrived, pt was coming around. Pt was assisted to ground in recovery position by fire

## 2024-03-24 NOTE — ED PROVIDER NOTES
EMERGENCY DEPARTMENT ENCOUNTER  Room Number:  13/13  PCP: Ishmael Medellin MD  Independent Historians: Patient, EMS      HPI:  Chief Complaint: had concerns including Syncope.     A complete HPI/ROS/PMH/PSH/SH/FH are unobtainable due to: Nothing      Context: Kamilla Hawthorne is a 78 y.o. female with a medical history of atrial fibrillation, hypertension, lung mass who presents to the ED c/o acute syncopal episode.  Patient was reportedly at the table eating dinner.  Her  had gone up to the other room for a moment and when he came back she was unresponsive.  She did not fall out of the chair.  When the fire department arrived patient remained unresponsive.  EMS states that patient slowly began to wake up and answer some questions, almost as if she was in a postictal state.  She denies history of seizure.  She states that she recalls feeling hot at the dinner table but denies feeling lightheaded or having other symptoms for this occurred.  She had been feeling well all day today.  She denies any nausea, vomiting, diarrhea.  She is on Eliquis due to history of atrial fibrillation.  No reported black or bloody stool.  She denies chest pain, shortness of breath, fever or chills.  She reports feeling much better now with no complaints.  She simply denies chest pain or headache.  EMS reports blood glucose was normal prior to arrival.      Review of prior external notes (non-ED) -and- Review of prior external test results outside of this encounter: See ED course below for summary of prior discharge summary where patient was admitted for syncope        PAST MEDICAL HISTORY  Active Ambulatory Problems     Diagnosis Date Noted    Essential hypertension 08/08/2017    History of melanoma 08/23/2019    History of adenomatous polyp of colon 12/02/2020    Family history of colon cancer 12/02/2020    Mixed hyperlipidemia 02/26/2021    Syncope, unspecified syncope type 06/09/2023    Lung mass 06/09/2023    Paroxysmal atrial  fibrillation 2023    Cognitive dysfunction 10/04/2023     Resolved Ambulatory Problems     Diagnosis Date Noted    Upper respiratory infection 2019    Right ear pain 2019    Cough 2019    Pink eye, left 2019    Subacute otitis media 2019     Past Medical History:   Diagnosis Date    Atrial fibrillation     Colon polyp     Glaucoma     Hypertension     Lung cancer     Melanoma          PAST SURGICAL HISTORY  Past Surgical History:   Procedure Laterality Date    BRONCHOSCOPY WITH ION ROBOTIC ASSIST Right 6/15/2023    Procedure: BRONCHOSCOPY WITH ION ROBOT WITH FNA, BIOPSIES AND BAL;  Surgeon: Tomas Booker MD;  Location: [facility] ENDOSCOPY;  Service: Robotics - Pulmonary;  Laterality: Right;  PRE OP - LUNG MASS  POST OP - LUNG MASS    CATARACT EXTRACTION Bilateral     w/ lense implant    COLONOSCOPY      COLONOSCOPY N/A 2019    Procedure: COLONOSCOPY into cecum and TI with cold biopsy polypectomies;  Surgeon: Suhail Rothman MD;  Location: [facility] ENDOSCOPY;  Service: Gastroenterology    COLONOSCOPY N/A 2021    Procedure: COLONOSCOPY TO CECUM/TI WITH COLD BX POLYPECTOMIES;  Surgeon: Suhail Rothman MD;  Location: [facility] ENDOSCOPY;  Service: Gastroenterology;  Laterality: N/A;  PERSONAL HX OF POLYPS, FAMILY HX OF COLON CANCER  --DIVERTICULOSIS, POLYPS, INTERNAL HEMORRHOIDS     COLONOSCOPY W/ POLYPECTOMY      LASIK Left          FAMILY HISTORY  Family History   Problem Relation Age of Onset    Emphysema Mother     Stroke Father     Malig Hyperthermia Neg Hx          SOCIAL HISTORY  Social History     Socioeconomic History    Marital status:    Tobacco Use    Smoking status: Former     Current packs/day: 0.00     Average packs/day: 1 pack/day for 20.0 years (20.0 ttl pk-yrs)     Types: Cigarettes     Start date:      Quit date:      Years since quittin.2     Passive exposure: Past    Smokeless tobacco: Never    Tobacco comments:     Caffeine - decaf  coffee   Vaping Use    Vaping status: Never Used   Substance and Sexual Activity    Alcohol use: Yes     Alcohol/week: 3.0 standard drinks of alcohol     Types: 3 Glasses of wine per week     Comment: SOCIAL    Drug use: Never    Sexual activity: Defer         ALLERGIES  Penicillins      REVIEW OF SYSTEMS  Review of all 14 systems is negative other than stated in the HPI above.      PHYSICAL EXAM    I have reviewed the triage vital signs and nursing notes.    ED Triage Vitals [03/23/24 2019]   Temp Heart Rate Resp BP SpO2   98 °F (36.7 °C) 61 16 122/66 99 %      Temp src Heart Rate Source Patient Position BP Location FiO2 (%)   Tympanic Monitor -- -- --         GENERAL: awake and alert, well-appearing, no acute distress, GCS 15  HENT: Normocephalic, atraumatic  EYES: no scleral icterus, pupils 3 mm reactive bilaterally, EOMI  CV: regular rhythm, regular rate, no murmur appreciated  RESPIRATORY: normal effort  ABDOMEN: soft, nondistended, nontender throughout  MUSCULOSKELETAL: no deformity  NEURO: alert, moves all extremities, follows commands, cranial nerves II through XII grossly intact with clear  PSYCH: calm, cooperative  SKIN: Warm, dry          LAB RESULTS  Recent Results (from the past 24 hour(s))   ECG 12 Lead Syncope    Collection Time: 03/23/24  8:36 PM   Result Value Ref Range    QT Interval 425 ms    QTC Interval 418 ms   Comprehensive Metabolic Panel    Collection Time: 03/23/24  8:39 PM    Specimen: Blood   Result Value Ref Range    Glucose 127 (H) 65 - 99 mg/dL    BUN 14 8 - 23 mg/dL    Creatinine 0.79 0.57 - 1.00 mg/dL    Sodium 142 136 - 145 mmol/L    Potassium 3.7 3.5 - 5.2 mmol/L    Chloride 107 98 - 107 mmol/L    CO2 22.0 22.0 - 29.0 mmol/L    Calcium 8.9 8.6 - 10.5 mg/dL    Total Protein 6.6 6.0 - 8.5 g/dL    Albumin 4.2 3.5 - 5.2 g/dL    ALT (SGPT) 20 1 - 33 U/L    AST (SGOT) 15 1 - 32 U/L    Alkaline Phosphatase 100 39 - 117 U/L    Total Bilirubin 1.0 0.0 - 1.2 mg/dL    Globulin 2.4 gm/dL     A/G Ratio 1.8 g/dL    BUN/Creatinine Ratio 17.7 7.0 - 25.0    Anion Gap 13.0 5.0 - 15.0 mmol/L    eGFR 76.7 >60.0 mL/min/1.73   BNP    Collection Time: 03/23/24  8:39 PM    Specimen: Blood   Result Value Ref Range    proBNP 866.0 0.0 - 1,800.0 pg/mL   High Sensitivity Troponin T    Collection Time: 03/23/24  8:39 PM    Specimen: Blood   Result Value Ref Range    HS Troponin T 23 (H) <14 ng/L   Magnesium    Collection Time: 03/23/24  8:39 PM    Specimen: Blood   Result Value Ref Range    Magnesium 1.8 1.6 - 2.4 mg/dL   CBC Auto Differential    Collection Time: 03/23/24  8:39 PM    Specimen: Blood   Result Value Ref Range    WBC 6.89 3.40 - 10.80 10*3/mm3    RBC 5.12 3.77 - 5.28 10*6/mm3    Hemoglobin 15.7 12.0 - 15.9 g/dL    Hematocrit 48.0 (H) 34.0 - 46.6 %    MCV 93.8 79.0 - 97.0 fL    MCH 30.7 26.6 - 33.0 pg    MCHC 32.7 31.5 - 35.7 g/dL    RDW 13.3 12.3 - 15.4 %    RDW-SD 45.5 37.0 - 54.0 fl    MPV 9.2 6.0 - 12.0 fL    Platelets 239 140 - 450 10*3/mm3    Neutrophil % 56.3 42.7 - 76.0 %    Lymphocyte % 35.6 19.6 - 45.3 %    Monocyte % 6.1 5.0 - 12.0 %    Eosinophil % 0.7 0.3 - 6.2 %    Basophil % 1.0 0.0 - 1.5 %    Immature Grans % 0.3 0.0 - 0.5 %    Neutrophils, Absolute 3.88 1.70 - 7.00 10*3/mm3    Lymphocytes, Absolute 2.45 0.70 - 3.10 10*3/mm3    Monocytes, Absolute 0.42 0.10 - 0.90 10*3/mm3    Eosinophils, Absolute 0.05 0.00 - 0.40 10*3/mm3    Basophils, Absolute 0.07 0.00 - 0.20 10*3/mm3    Immature Grans, Absolute 0.02 0.00 - 0.05 10*3/mm3    nRBC 0.0 0.0 - 0.2 /100 WBC       The above labs were ordered by me and independently reviewed by me.     RADIOLOGY  XR Chest 1 View    Result Date: 3/23/2024  EXAMINATION: SINGLE-VIEW CHEST RADIOGRAPH  HISTORY: 78-year-old female with a history of syncope.  FINDINGS: A portable upright AP chest radiograph was obtained. Comparison is made to a chest radiograph dated 03/23/2024. There is interstitial and parenchymal opacification that likely represents scarring in the  right upper lobe. The left lung is clear. The cardiomediastinal silhouette and osseous structures appear normal.      There is a right upper lobe interstitial lung parenchymal opacification which likely represents scarring although an interstitial infiltrate and/or pneumonia in the region cannot be completely excluded.  This report was finalized on 3/23/2024 9:11 PM by Dr. Krishan Ferreira M.D on Workstation: [id number]       The above radiology studies were ordered by me.  See ED course for independent interpretations.     MEDICATIONS GIVEN IN ER  Medications   sodium chloride 0.9 % flush 10 mL (has no administration in time range)         ORDERS PLACED DURING THIS VISIT:  Orders Placed This Encounter   Procedures    XR Chest 1 View    Comprehensive Metabolic Panel    BNP    High Sensitivity Troponin T    Magnesium    CBC Auto Differential    High Sensitivity Troponin T 2Hr    Continuous Pulse Oximetry    Monitor Blood Pressure    ECG 12 Lead Syncope    Insert Peripheral IV    Initiate ED Observation Status    CBC & Differential         OUTPATIENT MEDICATION MANAGEMENT:  Current Facility-Administered Medications Ordered in Epic   Medication Dose Route Frequency Provider Last Rate Last Admin    sodium chloride 0.9 % flush 10 mL  10 mL Intravenous PRN Fabricio Jiménez MD         Current Outpatient Medications Ordered in Epic   Medication Sig Dispense Refill    acetaminophen (TYLENOL) 500 MG tablet Take 1 tablet by mouth Every 6 (Six) Hours As Needed. Indications: Pain      amLODIPine (NORVASC) 2.5 MG tablet TAKE 2 TABLETS BY MOUTH DAILY FOR HIGH BLOOD PRESSURE DISORDER 180 tablet 1    atorvastatin (LIPITOR) 10 MG tablet TAKE 1 TABLET BY MOUTH EVERY DAY 90 tablet 1    cholecalciferol (VITAMIN D3) 1000 units tablet Take 1 tablet by mouth Daily. Indications: Vitamin D Deficiency      dorzolamide-timolol (COSOPT) 22.3-6.8 MG/ML ophthalmic solution INSTILL 1 DROP BY OPHTHALMIC ROUTE 2 TIMES EVERY DAY INTO BOTH EYES  6     Eliquis 5 MG tablet tablet TAKE 1 TABLET BY MOUTH 2 (TWO) TIMES A DAY. INDICATIONS: ATRIAL FIBRILLATION 180 tablet 1    latanoprost (XALATAN) 0.005 % ophthalmic solution INSTILL 1 DROP BY OPHTHALMIC ROUTE EVERY DAY BOTH EYES AT BEDTIME  6    metoprolol tartrate (LOPRESSOR) 25 MG tablet Take 0.5 tablets by mouth Every 12 (Twelve) Hours. Indications: High Blood Pressure Disorder 90 tablet 1    valsartan (DIOVAN) 80 MG tablet TAKE 1 TABLET BY MOUTH DAILY FOR HIGH BLOOD PRESSURE 90 tablet 1    vitamin C (ASCORBIC ACID) 500 MG tablet Take 1 tablet by mouth Daily. Indications: Inadequate Vitamin C           PROCEDURES  Procedures            PROGRESS, DATA ANALYSIS, CONSULTS, AND MEDICAL DECISION MAKING  All labs have been independently interpreted by me.  All radiology studies have been reviewed by me. All EKG's have been independently viewed and interpreted by me.  Discussion below represents my analysis of pertinent findings related to patient's condition, differential diagnosis, treatment plan and final disposition.    Differential diagnosis includes but is not limited to:  Sick sinus syndrome  Vasovagal syncope  Orthostatic hypotension  GI bleed  Seizure      Clinical Scores:                  ED Course as of 03/23/24 2153   Sat Mar 23, 2024   2032 Record review: I reviewed discharge summary from 6/15/2023 where patient was admitted for a syncopal episode.  She had an episode of atrial fibrillation in the hospital with RVR.  She had a TTE which was unremarkable and showed EF 57%.  There was concern that she could have sick sinus syndrome and she was discharged with a 2-week Zio patch. [JR]   2049 EKG          EKG time: 2036  Rhythm/Rate: Sinus rhythm, 58  P waves and [place]: Normal  QRS, axis: Normal axis  ST and T waves: No acute ischemic changes    Interpreted Contemporaneously by me, independently viewed  Similar compared to prior 6-23 however rate is slower today and patient is no longer in atrial fibrillation        [JR]   2050 Chest x-ray independently interpreted in PACS.  The lung fields are clear bilaterally.  No infiltrate. [JR]   2151 Discussed with BELLA Juarez in the ED observation unit who agrees to admit on behalf of Dr. Velazquez. [JR]      ED Course User Index  [JR] Fabricio Jiménez MD             AS OF 21:53 EDT VITALS:    BP - 122/66  HR - 61  TEMP - 98 °F (36.7 °C) (Tympanic)  O2 SATS - 99%    COMPLEXITY OF CARE  The patient requires admission.      Chronic or social conditions impacting patient care (Social Determinants of Health):     DIAGNOSIS  Final diagnoses:   Syncope and collapse   Essential hypertension   Paroxysmal atrial fibrillation           DISPOSITION  Admit      Prescription drug monitoring program review:           Please note that portions of this document were completed with a voice recognition program.    Note Disclaimer: At [facility], we believe that sharing information builds trust and better relationships. You are receiving this note because you recently visited [facility]. It is possible you will see health information before a provider has talked with you about it. This kind of information can be easy to misunderstand. To help you fully understand what it means for your health, we urge you to discuss this note with your provider.         Fabricio Jiménez MD  03/23/24 2153

## 2024-03-24 NOTE — PROGRESS NOTES
MD Attestation Note    I supervised care provided by the midlevel provider.    The AKILAH and I have discussed this patient's history, physical exam, and treatment plan. I have reviewed the documentation and personally had a face to face interaction with the patient  I affirm the documentation and agree with the treatment and plan.       My personal findings are:        Subjective:  Patient admitted overnight for a syncopal episode that occurred while she was sitting at the dinner table last night.  She reports feeling well this morning and has had no adverse events overnight.        Objective:  General: Awake and alert, no acute distress  Respiratory: Normal work of breathing  Neuro: Cranial nerves II through XII grossly intact, speech fluent and clear        Assessment/ Plan:  Syncopal episode  Patient had similar symptoms last June in setting of A-fib with RVR.  She remains in sinus rhythm here, anticoagulated on Eliquis.  No adverse events overnight on cardiac monitoring.  Awaiting cardiology consult regarding possible sick sinus syndrome contributing to her symptoms.  Patient did have a 2-week Holter study performed last summer which was reassuring.  I anticipate she may need another Holter or consideration of loop recorder.

## 2024-03-24 NOTE — CONSULTS
Date of Hospital Visit: 3/23/2024  Date of consult: 3/24/2024  Encounter Provider: Keo Torres MD  Place of Service: [facility] CARDIOLOGY  Patient Name: Kamilla Hawthorne  :1945  Referral Provider: Robert Velazquez MD    Chief complaint: Syncope    Reason for consult: Syncope    History of Present Illness: Kamilla Hawthorne is a 78-year-old female who follows with Dr. Lazaro and has a past medical history that is significant for hypertension, hyperlipidemia, pulmonary nodule, near syncope, paroxysmal atrial fibrillation, and history of tobacco use.    She was hospitalized in 2023 after having recurrent near syncope and eventually falling and hitting her head.  She denied loss of consciousness or syncope with that episode.  She suffered a left frontal scalp hematoma.  Additionally she was found to have a right upper lobe lung mass and paroxysmal atrial fibrillation.  She then underwent robotic bronchoscopy which was positive for non-small cell lung cancer.  She is on Eliquis and metoprolol tartrate for her atrial fibrillation.    She presented to the ED from home after an unresponsive episode.  She was reportedly sitting at the table eating dinner with her , he left the room and when he returned he found her sitting in her chair unresponsive.  EMS was called and when they arrived she remained unresponsive.  EMS was concerned that she may be in a postictal state.  She does recall feeling hot at the dinner table but denies a sensation of lightheadedness or any other symptoms.  She has been in her normal state of health.  Her blood glucose was normal upon EMS assessment.    Workup in the ED included high-sensitivity troponin 23 ->23->27.  Chest x-ray showed no acute process.      Echocardiogram 6/10/2023    Left ventricular systolic function is normal. Calculated left ventricular EF = 57.5%    Left ventricular diastolic function was normal.      Past Medical  History:   Diagnosis Date    Atrial fibrillation     Colon polyp     Family history of colon cancer     Glaucoma     Hypertension     Lung cancer     Melanoma     Mixed hyperlipidemia 02/26/2021       Past Surgical History:   Procedure Laterality Date    BRONCHOSCOPY WITH ION ROBOTIC ASSIST Right 6/15/2023    Procedure: BRONCHOSCOPY WITH ION ROBOT WITH FNA, BIOPSIES AND BAL;  Surgeon: Tomas Booker MD;  Location:  [facility];  Service: Robotics - Pulmonary;  Laterality: Right;  PRE OP - LUNG MASS  POST OP - LUNG MASS    CATARACT EXTRACTION Bilateral     w/ lense implant    COLONOSCOPY      COLONOSCOPY N/A 02/04/2019    Procedure: COLONOSCOPY into cecum and TI with cold biopsy polypectomies;  Surgeon: Suhail Rothman MD;  Location:  [facility];  Service: Gastroenterology    COLONOSCOPY N/A 02/09/2021    Procedure: COLONOSCOPY TO CECUM/TI WITH COLD BX POLYPECTOMIES;  Surgeon: Suhail Rothman MD;  Location:  [facility];  Service: Gastroenterology;  Laterality: N/A;  PERSONAL HX OF POLYPS, FAMILY HX OF COLON CANCER  --DIVERTICULOSIS, POLYPS, INTERNAL HEMORRHOIDS     COLONOSCOPY W/ POLYPECTOMY      LASIK Left        Medications Prior to Admission   Medication Sig Dispense Refill Last Dose    acetaminophen (TYLENOL) 500 MG tablet Take 1 tablet by mouth Every 6 (Six) Hours As Needed. Indications: Pain   Past Week    amLODIPine (NORVASC) 2.5 MG tablet TAKE 2 TABLETS BY MOUTH DAILY FOR HIGH BLOOD PRESSURE DISORDER 180 tablet 1 3/23/2024    atorvastatin (LIPITOR) 10 MG tablet TAKE 1 TABLET BY MOUTH EVERY DAY 90 tablet 1 3/22/2024    cholecalciferol (VITAMIN D3) 1000 units tablet Take 1 tablet by mouth Daily. Indications: Vitamin D Deficiency   3/23/2024    Eliquis 5 MG tablet tablet TAKE 1 TABLET BY MOUTH 2 (TWO) TIMES A DAY. INDICATIONS: ATRIAL FIBRILLATION 180 tablet 1 3/23/2024    metoprolol tartrate (LOPRESSOR) 25 MG tablet Take 0.5 tablets by mouth Every 12 (Twelve) Hours. Indications: High Blood  Pressure Disorder 90 tablet 1 3/23/2024    valsartan (DIOVAN) 80 MG tablet TAKE 1 TABLET BY MOUTH DAILY FOR HIGH BLOOD PRESSURE 90 tablet 1 3/23/2024    vitamin C (ASCORBIC ACID) 500 MG tablet Take 1 tablet by mouth Daily. Indications: Inadequate Vitamin C   3/23/2024    dorzolamide-timolol (COSOPT) 22.3-6.8 MG/ML ophthalmic solution INSTILL 1 DROP BY OPHTHALMIC ROUTE 2 TIMES EVERY DAY INTO BOTH EYES  6     latanoprost (XALATAN) 0.005 % ophthalmic solution INSTILL 1 DROP BY OPHTHALMIC ROUTE EVERY DAY BOTH EYES AT BEDTIME  6        Current Meds  Scheduled Meds:amLODIPine, 5 mg, Oral, Q24H  apixaban, 5 mg, Oral, Q12H  atorvastatin, 10 mg, Oral, Daily  metoprolol tartrate, 12.5 mg, Oral, Q12H  sodium chloride, 10 mL, Intravenous, Q12H  valsartan, 80 mg, Oral, Daily      Continuous Infusions:   PRN Meds:.  acetaminophen    senna-docusate sodium **AND** polyethylene glycol **AND** bisacodyl **AND** bisacodyl    Calcium Replacement - Follow Nurse / BPA Driven Protocol    Magnesium Standard Dose Replacement - Follow Nurse / BPA Driven Protocol    nitroglycerin    ondansetron ODT **OR** ondansetron    Phosphorus Replacement - Follow Nurse / BPA Driven Protocol    Potassium Replacement - Follow Nurse / BPA Driven Protocol    [COMPLETED] Insert Peripheral IV **AND** sodium chloride    sodium chloride    sodium chloride    Allergies as of 2024 - Reviewed 2024   Allergen Reaction Noted    Penicillins Itching 2017       Social History     Socioeconomic History    Marital status:    Tobacco Use    Smoking status: Former     Current packs/day: 0.00     Average packs/day: 1 pack/day for 20.0 years (20.0 ttl pk-yrs)     Types: Cigarettes     Start date:      Quit date:      Years since quittin.2     Passive exposure: Past    Smokeless tobacco: Never    Tobacco comments:     Caffeine - decaf coffee   Vaping Use    Vaping status: Never Used   Substance and Sexual Activity    Alcohol use: Yes      "Alcohol/week: 3.0 standard drinks of alcohol     Types: 3 Glasses of wine per week     Comment: SOCIAL    Drug use: Never    Sexual activity: Defer       Family History   Problem Relation Age of Onset    Emphysema Mother     Stroke Father     Malig Hyperthermia Neg Hx        REVIEW OF SYSTEMS:   All systems reviewed and pertinent positives include in HPI otherwise negative review of systems.       Objective:   Temp:  [97.7 °F (36.5 °C)-98.1 °F (36.7 °C)] 97.7 °F (36.5 °C)  Heart Rate:  [59-79] 62  Resp:  [16-17] 17  BP: (113-138)/(59-77) 132/74  Body mass index is 25.66 kg/m².  Flowsheet Rows      Flowsheet Row First Filed Value   Admission Height 167.6 cm (66\") Documented at 03/23/2024 2019   Admission Weight 70.8 kg (156 lb) Documented at 03/23/2024 2019          Vitals:    03/24/24 1058   BP: 132/74   Pulse:    Resp: 17   Temp: 97.7 °F (36.5 °C)   SpO2:        General Appearance:    Alert, cooperative, in no acute distress   Head:    Normocephalic, without obvious abnormality, atraumatic   Eyes:            Lids and lashes normal, conjunctivae and sclerae normal, no   icterus, no pallor, corneas clear, PERRLA   Ears:    Ears appear intact with no abnormalities noted   Throat:   No oral lesions, no thrush, oral mucosa moist   Neck:   No adenopathy, supple, trachea midline, no thyromegaly, no   carotid bruit, no JVD   Back:     No kyphosis present, no scoliosis present, no skin lesions, erythema or scars, no tenderness to percussion or palpation, range of motion normal   Lungs:     Clear to auscultation,respirations regular, even and unlabored    Heart:    Regular rhythm and normal rate, normal S1 and S2, no murmur, no gallop, no rub, no click   Chest Wall:    No abnormalities observed   Abdomen:     Normal bowel sounds, no masses, no organomegaly, soft nontender, nondistended, no guarding, no rebound  tenderness   Extremities:   Moves all extremities well, no edema, no cyanosis, no redness   Pulses:   Pulses " palpable and equal bilaterally. Normal radial, carotid, femoral, dorsalis pedis and posterior tibial pulses bilaterally. Normal abdominal aorta   Skin:  Neurology:   Psychiatric:   No bleeding, bruising or rash   Normal speech and cranial nerve exam, no focal deficit   Alert and oriented x 3, normal mood and affect             Review of Data:      Results from last 7 days   Lab Units 03/24/24  0504 03/23/24 2039   SODIUM mmol/L 143 142   POTASSIUM mmol/L 3.8 3.7   CHLORIDE mmol/L 109* 107   CO2 mmol/L 23.6 22.0   BUN mg/dL 19 14   CREATININE mg/dL 0.70 0.79   CALCIUM mg/dL 8.6 8.9   BILIRUBIN mg/dL  --  1.0   ALK PHOS U/L  --  100   ALT (SGPT) U/L  --  20   AST (SGOT) U/L  --  15   GLUCOSE mg/dL 72 127*     Results from last 7 days   Lab Units 03/24/24  0504 03/24/24  0002 03/23/24 2039   HSTROP T ng/L 27* 23* 23*     @LABRCNTbnp@  Results from last 7 days   Lab Units 03/24/24  0504 03/23/24 2039   WBC 10*3/mm3 7.27 6.89   HEMOGLOBIN g/dL 14.1 15.7   HEMATOCRIT % 42.6 48.0*   PLATELETS 10*3/mm3 215 239         Results from last 7 days   Lab Units 03/23/24 2039   MAGNESIUM mg/dL 1.8     @LABRCNTIP(chol,trig,hdl,ldl)    I personally viewed and interpreted the patient's EKG/Telemetry data  )   Syncope              Assessment and Plan:    Ms. Hawthorne was brought to hospital for evaluation of a syncopal spell that was preceded by prodromal symptoms while she was standing in her kitchen.  She passed out for several minutes.  She reports feeling yesterday during daytime and had to use a walker.  No recent medication changes and no changes in oral intake.  She denies any palpitations, chest discomfort.  ER workup revealed mildly elevated troponin that is flat and EKG was negative for ischemia otherwise no significant findings.    Syncope preceded by prodromal symptoms of lightheadedness.  No palpitation.  Probably she had a hypotensive episodes but no triggers other than standing.  No evidence for arrhythmia  History of  paroxysmal atrial fibrillation on chronic anticoagulation with Eliquis  Essential hypertension that is well-controlled  Hypercholesterolemia on statin  Coronary artery calcification noted on CT chest on statin     She feels fine this morning.  Blood pressure is normal.  I have decreased amlodipine to 2.5 mg p.o. to be taken at night.  She will check blood pressure regularly.  Her daughters are speech and physical therapist at [facility] and where at bedside during examination.  She will start leg exercise.  Outpatient stress test ordered.  Follow-up in cardiology clinic in 2 weeks with BP logs     I have discussed diagnosis and plan of care with Nel Torres MD  03/24/24  12:18 EDT.      Time spent in reviewing chart, discussion and examination:

## 2024-03-24 NOTE — ED NOTES
"Nursing report ED to floor  Kamilla Hawthorne  78 y.o.  female    HPI :  HPI (Adult)  Stated Reason for Visit: syncope    Chief Complaint  Chief Complaint   Patient presents with    Syncope       Admitting doctor:   Robert Velazquez MD    Admitting diagnosis:   The primary encounter diagnosis was Syncope and collapse. Diagnoses of Essential hypertension and Paroxysmal atrial fibrillation were also pertinent to this visit.    Code status:   Current Code Status       Date Active Code Status Order ID Comments User Context       3/23/2024 2154 CPR (Attempt to Resuscitate) [id number]  Hui Hdez APRN ED        Question Answer    Code Status (Patient has no pulse and is not breathing) CPR (Attempt to Resuscitate)    Medical Interventions (Patient has pulse or is breathing) Full Support                    Allergies:   Penicillins    Isolation:   No active isolations    Intake and Output  No intake or output data in the 24 hours ending 03/23/24 2207    Weight:       03/23/24 2019   Weight: 70.8 kg (156 lb)       Most recent vitals:   Vitals:    03/23/24 2019   BP: 122/66   Pulse: 61   Resp: 16   Temp: 98 °F (36.7 °C)   TempSrc: Tympanic   SpO2: 99%   Weight: 70.8 kg (156 lb)   Height: 167.6 cm (66\")       Active LDAs/IV Access:   Lines, Drains & Airways       Active LDAs       Name Placement date Placement time Site Days    Peripheral IV 03/23/24 2024 Left Antecubital 03/23/24 2024  Antecubital  less than 1                    Labs (abnormal labs have a star):   Labs Reviewed   COMPREHENSIVE METABOLIC PANEL - Abnormal; Notable for the following components:       Result Value    Glucose 127 (*)     All other components within normal limits    Narrative:     GFR Normal >60  Chronic Kidney Disease <60  Kidney Failure <15    The GFR formula is only valid for adults with stable renal function between ages 18 and 70.   TROPONIN - Abnormal; Notable for the following components:    HS Troponin T 23 (*)     All other components " within normal limits    Narrative:     High Sensitive Troponin T Reference Range:  <14.0 ng/L- Negative Female for AMI  <22.0 ng/L- Negative Male for AMI  >=14 - Abnormal Female indicating possible myocardial injury.  >=22 - Abnormal Male indicating possible myocardial injury.   Clinicians would have to utilize clinical acumen, EKG, Troponin, and serial changes to determine if it is an Acute Myocardial Infarction or myocardial injury due to an underlying chronic condition.        CBC WITH AUTO DIFFERENTIAL - Abnormal; Notable for the following components:    Hematocrit 48.0 (*)     All other components within normal limits   BNP (IN-HOUSE) - Normal    Narrative:     This assay is used as an aid in the diagnosis of individuals suspected of having heart failure. It can be used as an aid in the diagnosis of acute decompensated heart failure (ADHF) in patients presenting with signs and symptoms of ADHF to the emergency department (ED). In addition, NT-proBNP of <300 pg/mL indicates ADHF is not likely.    Age Range Result Interpretation  NT-proBNP Concentration (pg/mL:      <50             Positive            >450                   Gray                 300-450                    Negative             <300    50-75           Positive            >900                  Gray                300-900                  Negative            <300      >75             Positive            >1800                  Gray                300-1800                  Negative            <300   MAGNESIUM - Normal   HIGH SENSITIVITIY TROPONIN T 2HR   CBC (NO DIFF)   BASIC METABOLIC PANEL   TROPONIN   CBC AND DIFFERENTIAL    Narrative:     The following orders were created for panel order CBC & Differential.  Procedure                               Abnormality         Status                     ---------                               -----------         ------                     CBC Auto Differential[[id number]]        Abnormal            Final  result                 Please view results for these tests on the individual orders.       EKG:   ECG 12 Lead Syncope   Preliminary Result   HEART RATE= 58  bpm   RR Interval= 1034  ms   NH Interval= 196  ms   P Horizontal Axis= 0  deg   P Front Axis= 80  deg   QRSD Interval= 79  ms   QT Interval= 425  ms   QTcB= 418  ms   QRS Axis= 41  deg   T Wave Axis= 43  deg   - ABNORMAL ECG -   Sinus rhythm   LAE, consider biatrial enlargement   ST elevation, consider inferior injury   Electronically Signed By:    Date and Time of Study: 2024-03-23 20:36:02          Meds given in ED:   Medications   sodium chloride 0.9 % flush 10 mL (has no administration in time range)   sodium chloride 0.9 % flush 10 mL (has no administration in time range)   sodium chloride 0.9 % flush 10 mL (has no administration in time range)   sodium chloride 0.9 % infusion 40 mL (has no administration in time range)   ondansetron ODT (ZOFRAN-ODT) disintegrating tablet 4 mg (has no administration in time range)     Or   ondansetron (ZOFRAN) injection 4 mg (has no administration in time range)   nitroglycerin (NITROSTAT) SL tablet 0.4 mg (has no administration in time range)   Potassium Replacement - Follow Nurse / BPA Driven Protocol (has no administration in time range)   Magnesium Standard Dose Replacement - Follow Nurse / BPA Driven Protocol (has no administration in time range)   Phosphorus Replacement - Follow Nurse / BPA Driven Protocol (has no administration in time range)   Calcium Replacement - Follow Nurse / BPA Driven Protocol (has no administration in time range)   acetaminophen (TYLENOL) tablet 650 mg (has no administration in time range)   sennosides-docusate (PERICOLACE) 8.6-50 MG per tablet 2 tablet (has no administration in time range)     And   polyethylene glycol (MIRALAX) packet 17 g (has no administration in time range)     And   bisacodyl (DULCOLAX) EC tablet 5 mg (has no administration in time range)     And   bisacodyl (DULCOLAX)  suppository 10 mg (has no administration in time range)       Imaging results:  XR Chest 1 View    Result Date: 3/23/2024  There is a right upper lobe interstitial lung parenchymal opacification which likely represents scarring although an interstitial infiltrate and/or pneumonia in the region cannot be completely excluded.  This report was finalized on 3/23/2024 9:11 PM by Dr. Krishan Ferreira M.D on Workstation: [employer]       Ambulatory status:   - independent    Social issues:   Social History     Socioeconomic History    Marital status:    Tobacco Use    Smoking status: Former     Current packs/day: 0.00     Average packs/day: 1 pack/day for 20.0 years (20.0 ttl pk-yrs)     Types: Cigarettes     Start date:      Quit date:      Years since quittin.2     Passive exposure: Past    Smokeless tobacco: Never    Tobacco comments:     Caffeine - decaf coffee   Vaping Use    Vaping status: Never Used   Substance and Sexual Activity    Alcohol use: Yes     Alcohol/week: 3.0 standard drinks of alcohol     Types: 3 Glasses of wine per week     Comment: SOCIAL    Drug use: Never    Sexual activity: Defer       Peripheral Neurovascular  Peripheral Neurovascular (Adult)  Peripheral Neurovascular WDL: WDL    Neuro Cognitive  Neuro Cognitive (Adult)  Cognitive/Neuro/Behavioral WDL: WDL  Additional Documentation: NIH Stroke Scale (group)  NIH Stroke Scale  Interval: baseline  1a. Level of Consciousness: 0-->Alert, keenly responsive  1b. LOC Questions: 0-->Answers both questions correctly  1c. LOC Commands: 0-->Performs both tasks correctly  2. Best Gaze: 0-->Normal  3. Visual: 0-->No visual loss  4. Facial Palsy: 0-->Normal symmetrical movements  5a. Motor Arm, Left: 0-->No drift, limb holds 90 (or 45) degrees for full 10 secs  5b. Motor Arm, Right: 0-->No drift, limb holds 90 (or 45) degrees for full 10 secs  6a. Motor Leg, Left: 0-->No drift, leg holds 30 degree position for full 5 secs  6b. Motor Leg,  Right: 0-->No drift, leg holds 30 degree position for full 5 secs  7. Limb Ataxia: 0-->Absent  8. Sensory: 0-->Normal, no sensory loss  9. Best Language: 0-->No aphasia, normal  10. Dysarthria: 0-->Normal  11. Extinction and Inattention (formerly Neglect): 0-->No abnormality  Total (NIH Stroke Scale): 0    Learning  Learning Assessment (Adult)  Learning Readiness and Ability: no barriers identified  Education Provided  Person Taught: patient, family member/friend  Teaching Method: verbal instruction    Respiratory  Respiratory WDL  Respiratory WDL: WDL    Abdominal Pain       Pain Assessments  Pain (Adult)  (0-10) Pain Rating: Rest: 0    NIH Stroke Scale  NIH Stroke Scale  Interval: baseline  1a. Level of Consciousness: 0-->Alert, keenly responsive  1b. LOC Questions: 0-->Answers both questions correctly  1c. LOC Commands: 0-->Performs both tasks correctly  2. Best Gaze: 0-->Normal  3. Visual: 0-->No visual loss  4. Facial Palsy: 0-->Normal symmetrical movements  5a. Motor Arm, Left: 0-->No drift, limb holds 90 (or 45) degrees for full 10 secs  5b. Motor Arm, Right: 0-->No drift, limb holds 90 (or 45) degrees for full 10 secs  6a. Motor Leg, Left: 0-->No drift, leg holds 30 degree position for full 5 secs  6b. Motor Leg, Right: 0-->No drift, leg holds 30 degree position for full 5 secs  7. Limb Ataxia: 0-->Absent  8. Sensory: 0-->Normal, no sensory loss  9. Best Language: 0-->No aphasia, normal  10. Dysarthria: 0-->Normal  11. Extinction and Inattention (formerly Neglect): 0-->No abnormality  Total (NIH Stroke Scale): 0    Esme Jaeger RN  03/23/24 22:07 EDT

## 2024-03-24 NOTE — H&P
[facility]   HISTORY AND PHYSICAL    Patient Name: Kamilla Hawthorne  : 1945  MRN: [id number]  Primary Care Physician:  Ishmael Medellin MD  Date of admission: 3/23/2024    Subjective   Subjective     Chief Complaint:   Chief Complaint   Patient presents with    Syncope         HPI:    Kamilla Hawthorne is a 78 y.o. female, with a past medical history including, but not limited to, cognitive dysfunction, hypertension, atrial fibrillation, hyperlipidemia, presented to the emergency department after having a syncopal episode at home.  Patient states that she was being stirring mashed potatoes when she became very hot and clammy.  She states that the next thing she remembers is waking up in the ambulance.   states that she was out for approximately 20 minutes prior to waking up.  Patient denies any chest pain, shortness of breath, nausea, vomiting, headache, fever, or chills.  She states that she was in her normal state of health until approximately 1 minute prior to having her syncopal episode.  She states that this was similar to the episode she had in 2023.  She states that she is back to her normal state of health at this time.  Cardiology has been consulted to see the patient in the a.m.    Review of Systems   All systems were reviewed and negative except for:    What was mentioned above in the HPI  Personal History     Past Medical History:   Diagnosis Date    Atrial fibrillation     Colon polyp     Family history of colon cancer     Glaucoma     Hypertension     Lung cancer     Melanoma     Mixed hyperlipidemia 2021       Past Surgical History:   Procedure Laterality Date    BRONCHOSCOPY WITH ION ROBOTIC ASSIST Right 6/15/2023    Procedure: BRONCHOSCOPY WITH ION ROBOT WITH FNA, BIOPSIES AND BAL;  Surgeon: Tomas Booker MD;  Location: [facility] ENDOSCOPY;  Service: Robotics - Pulmonary;  Laterality: Right;  PRE OP - LUNG MASS  POST OP - LUNG MASS    CATARACT EXTRACTION Bilateral      w/ lense implant    COLONOSCOPY      COLONOSCOPY N/A 02/04/2019    Procedure: COLONOSCOPY into cecum and TI with cold biopsy polypectomies;  Surgeon: Suhail Rothman MD;  Location:  [facility]OSCOPY;  Service: Gastroenterology    COLONOSCOPY N/A 02/09/2021    Procedure: COLONOSCOPY TO CECUM/TI WITH COLD BX POLYPECTOMIES;  Surgeon: Suhail Rothman MD;  Location: [facility] ENDOSCOPY;  Service: Gastroenterology;  Laterality: N/A;  PERSONAL HX OF POLYPS, FAMILY HX OF COLON CANCER  --DIVERTICULOSIS, POLYPS, INTERNAL HEMORRHOIDS     COLONOSCOPY W/ POLYPECTOMY      LASIK Left        Family History: family history includes Emphysema in her mother; Stroke in her father. Otherwise pertinent FHx was reviewed and not pertinent to current issue.    Social History:  reports that she quit smoking about 21 years ago. Her smoking use included cigarettes. She started smoking about 41 years ago. She has a 20 pack-year smoking history. She has been exposed to tobacco smoke. She has never used smokeless tobacco. She reports current alcohol use of about 3.0 standard drinks of alcohol per week. She reports that she does not use drugs.    Home Medications:  acetaminophen, amLODIPine, apixaban, atorvastatin, cholecalciferol, dorzolamide-timolol, latanoprost, metoprolol tartrate, valsartan, and vitamin C    Allergies:  Allergies   Allergen Reactions    Penicillins Itching       Objective   Objective     Vitals:   Temp:  [98 °F (36.7 °C)] 98 °F (36.7 °C)  Heart Rate:  [61] 61  Resp:  [16] 16  BP: (122)/(66) 122/66  Physical Exam    Constitutional: Awake, alert   Eyes: PERRLA   HENT: NCAT, mucous membranes moist   Neck: Supple, trachea midline   Respiratory: Clear to auscultation bilaterally, nonlabored respirations    Cardiovascular: Regular rhythm no murmurs,palpable pedal pulses bilaterally   Gastrointestinal: Positive bowel sounds, soft, nontender, nondistended   Musculoskeletal: No bilateral ankle edema,   Psychiatric: Appropriate affect,  cooperative   Neurologic: Oriented x 3, speech clear   Skin: No rashes     Result Review    Result Review:  I have personally reviewed the results from the time of this admission to 3/23/2024 21:55 EDT and agree with these findings:  [x]  Laboratory list / accordion  []  Microbiology  [x]  Radiology  [x]  EKG/Telemetry   []  Cardiology/Vascular   []  Pathology  [x]  Old records  []  Other:    In the emergency department high-sensitivity troponin was 23, proBNP 866, glucose 127, all other lab work is at baseline for the patient.  EKG shows sinus rhythm rate of 58.  Chest x-ray shows a right upper lobe interstitial lung proximal opacification which likely represents scarring although interstitial infiltrate and/or pneumonia in that region cannot be completely excluded.      Assessment & Plan   Assessment / Plan     Brief Patient Summary:  Kamilla Hawthorne is a 78 y.o. female who was admitted to the observation unit for further evaluation and treatment of her syncopal episode.    Active Hospital Problems:  Active Hospital Problems    Diagnosis     **Syncope      Plan:     Syncope  -Cardiac monitoring  -Vital signs every 4 hours  -Continuous pulse ox  -Cardiology consult  -High Sensitivity Troponin 23, repeat in a.m.  -EKG sinus rhythm  -N.p.o. after midnight     PAF  -Continue metoprolol, Eliquis    DVT prophylaxis:  Mechanical DVT prophylaxis orders are present.        CODE STATUS:    Code Status (Patient has no pulse and is not breathing): CPR (Attempt to Resuscitate)  Medical Interventions (Patient has pulse or is breathing): Full Support    Admission Status:  I believe this patient meets observation status.    78 minutes have been spent by [facility] providers in the care of this patient while under observation status.      Appropriate PPE worn during patient encounter.  Hand hygeine performed before and after seeing the patient.      Electronically signed by BELLA Story,  03/23/24, 9:55 PM EDT.

## 2024-03-24 NOTE — DISCHARGE INSTRUCTIONS
Reduce dose of amlodipine (Norvasc) to 2.5 mg and begin taking this nightly.  Keep log of blood pressure at home, bring log to follow-up appointment with cardiology.  Follow-up with cardiology in 2 weeks, their office will call you with appointment details.  They will also arrange for outpatient stress test.  Return to the emergency department with worsening symptoms, uncontrolled pain, inability to tolerate oral liquids, fever greater than 101°F not controlled by Tylenol or as needed with emergent concerns.

## 2024-03-24 NOTE — PLAN OF CARE
Problem: Adult Inpatient Plan of Care  Goal: Plan of Care Review  Outcome: Ongoing, Progressing  Flowsheets (Taken 3/24/2024 0040)  Plan of Care Reviewed With: patient  Outcome Evaluation: Patient admitted with syncope. Did not sustain a fall. Troponin is 23. EKG shows NSR. No complaints of pain. Will see Cardiology in the mornings  Goal: Patient-Specific Goal (Individualized)  Outcome: Ongoing, Progressing  Goal: Absence of Hospital-Acquired Illness or Injury  Outcome: Ongoing, Progressing  Intervention: Identify and Manage Fall Risk  Recent Flowsheet Documentation  Taken 3/24/2024 0000 by oTi Gomez RN  Safety Promotion/Fall Prevention:   clutter free environment maintained   fall prevention program maintained   activity supervised   safety round/check completed  Intervention: Prevent Skin Injury  Recent Flowsheet Documentation  Taken 3/24/2024 0000 by Toi Gomez RN  Body Position: position changed independently  Intervention: Prevent and Manage VTE (Venous Thromboembolism) Risk  Recent Flowsheet Documentation  Taken 3/24/2024 0000 by Toi Gomez RN  Activity Management: ambulated to bathroom  Intervention: Prevent Infection  Recent Flowsheet Documentation  Taken 3/24/2024 0000 by Toi Gomez RN  Infection Prevention:   single patient room provided   rest/sleep promoted  Goal: Optimal Comfort and Wellbeing  Outcome: Ongoing, Progressing  Intervention: Provide Person-Centered Care  Recent Flowsheet Documentation  Taken 3/24/2024 0000 by Toi Gomez RN  Trust Relationship/Rapport:   care explained   choices provided  Goal: Readiness for Transition of Care  Outcome: Ongoing, Progressing  Intervention: Mutually Develop Transition Plan  Recent Flowsheet Documentation  Taken 3/23/2024 2318 by Toi Gomez RN  Equipment Currently Used at Home: walker, rolling  Taken 3/23/2024 2316 by Toi Gomez RN  Transportation Anticipated: family or friend will provide  Patient/Family Anticipated Services at Transition:  none  Patient/Family Anticipates Transition to: home  Taken 3/23/2024 2315 by Toi Gomez, RN  Equipment Currently Used at Home: walker, rolling     Problem: Hypertension Comorbidity  Goal: Blood Pressure in Desired Range  Outcome: Ongoing, Progressing     Problem: Osteoarthritis Comorbidity  Goal: Maintenance of Osteoarthritis Symptom Control  Outcome: Ongoing, Progressing  Intervention: Maintain Osteoarthritis Symptom Control  Recent Flowsheet Documentation  Taken 3/24/2024 0000 by Toi Gomez, RN  Activity Management: ambulated to bathroom  Assistive Device Utilized: walker   Goal Outcome Evaluation:  Plan of Care Reviewed With: patient           Outcome Evaluation: Patient admitted with syncope. Did not sustain a fall. Troponin is 23. EKG shows NSR. No complaints of pain. Will see Cardiology in the mornings

## 2024-03-24 NOTE — DISCHARGE SUMMARY
ED OBSERVATION PROGRESS/DISCHARGE SUMMARY    Date of Admission: 3/23/2024   LOS: 0 days   PCP: Ishmael Medellin MD    Subjective patient reports feeling well this morning, voices no complaints.  States she was stirring much potatoes yesterday evening when she began to feel hot and sweaty, subsequently passed out.    Hospital Outcome: 78-year-old female admitted to the observation unit for further evaluation of syncope.  High-sensitivity troponin 23, 23, 27, remainder of laboratory evaluation largely within normal limits.  Chest x-ray shows right upper lobe interstitial lung parenchymal opacification which likely represents scarring although interstitial infiltrate and or pneumonia cannot be completely excluded.  Patient is not having any cough, she is afebrile, no leukocytosis, very low suspicion this represents pneumonia.    Patient was seen by cardiology, discussed with Dr. Torres.  He would like to reduce amlodipine dose to 2.5 mg daily and for this to be taken in the evening.  He would like patient to keep log of blood pressures at home and bring log to follow-up appointment with cardiology.  Follow-up with cardiology in 2 weeks.  He would also like patient to have outpatient stress test.  Discussed with patient who expresses understanding and is in agreement with plan.  Her daughters are at the bedside, they are employees at [employer] and are involved in her care.  Usual return to ER precautions discussed with patient who expresses understanding and is in agreement with plan.    ROS:  General: no fevers, chills  Respiratory: no cough, dyspnea  Cardiovascular: no chest pain, palpitations  Abdomen: No abdominal pain, nausea, vomiting, or diarrhea  Neurologic: No focal weakness    Objective   Physical Exam:  I have reviewed the vital signs.  Temp:  [98 °F (36.7 °C)-98.1 °F (36.7 °C)] 98.1 °F (36.7 °C)  Heart Rate:  [59-79] 59  Resp:  [16] 16  BP: (113-133)/(59-75) 114/59  General Appearance:     Alert, cooperative, no distress  Head:    Normocephalic, atraumatic  Eyes:    Sclerae anicteric  Neck:   Supple, no mass  Lungs: Clear to auscultation bilaterally, respirations unlabored  Heart: Regular rate and rhythm, S1 and S2 normal, no murmur, rub or gallop  Abdomen:  Soft, nontender, bowel sounds active, nondistended  Extremities: No clubbing, cyanosis, or edema to lower extremities  Pulses:  2+ and symmetric in distal lower extremities  Skin: No rashes   Neurologic: Oriented x3, Normal strength to extremities    Results Review:    I have reviewed the labs, radiology results and diagnostic studies.    Results from last 7 days   Lab Units 03/24/24  0504   WBC 10*3/mm3 7.27   HEMOGLOBIN g/dL 14.1   HEMATOCRIT % 42.6   PLATELETS 10*3/mm3 215     Results from last 7 days   Lab Units 03/24/24  0504 03/23/24 2039   SODIUM mmol/L 143 142   POTASSIUM mmol/L 3.8 3.7   CHLORIDE mmol/L 109* 107   CO2 mmol/L 23.6 22.0   BUN mg/dL 19 14   CREATININE mg/dL 0.70 0.79   CALCIUM mg/dL 8.6 8.9   BILIRUBIN mg/dL  --  1.0   ALK PHOS U/L  --  100   ALT (SGPT) U/L  --  20   AST (SGOT) U/L  --  15   GLUCOSE mg/dL 72 127*     Imaging Results (Last 24 Hours)       Procedure Component Value Units Date/Time    XR Chest 1 View [[id number]] Collected: 03/23/24 2058     Updated: 03/23/24 2114    Narrative:      EXAMINATION: SINGLE-VIEW CHEST RADIOGRAPH     HISTORY: 78-year-old female with a history of syncope.     FINDINGS: A portable upright AP chest radiograph was obtained.  Comparison is made to a chest radiograph dated 03/23/2024. There is  interstitial and parenchymal opacification that likely represents  scarring in the right upper lobe. The left lung is clear. The  cardiomediastinal silhouette and osseous structures appear normal.       Impression:      There is a right upper lobe interstitial lung parenchymal  opacification which likely represents scarring although an interstitial  infiltrate and/or pneumonia in the region cannot  be completely excluded.     This report was finalized on 3/23/2024 9:11 PM by Dr. Krishan Ferreira M.D on Workstation: [id number]               I have reviewed the medications.  ---------------------------------------------------------------------------------------------  Assessment & Plan   Assessment/Problem List    Syncope      Plan:    Syncope  -Cardiac monitoring  -Vital signs every 4 hours  -Continuous pulse ox  -High Sensitivity Troponin 23, repeat in a.m.  -EKG sinus rhythm  -Consult cardiology, Dr. Torres  -Reduce amlodipine to 2.5 mg daily, take this at night  -Keep log of blood pressure at home, bring log to follow-up appointment with cardiology  -Outpatient stress test  -Follow-up with cardiology in 2 weeks     PAF  -Continue metoprolol, Eliquis    Disposition: Home    Follow-up after Discharge: Cardiology, PCP    33 minutes has been spent by [facility] providers in the care of this patient while under observation status     This note will serve as discharge summary    ALYSIA Cochran 03/24/24 12:39 EDT    I have worn appropriate PPE during this patient encounter and sanitized my hands with entering and exiting patient room.

## 2024-03-24 NOTE — PLAN OF CARE
Goal Outcome Evaluation:         Patient awaits spouse. She has all belongings and voices understanding of discharge with medication changes.

## 2024-03-25 ENCOUNTER — TELEPHONE (OUTPATIENT)
Dept: CARDIOLOGY | Facility: CLINIC | Age: 79
End: 2024-03-25
Payer: MEDICARE

## 2024-03-25 ENCOUNTER — TRANSITIONAL CARE MANAGEMENT TELEPHONE ENCOUNTER (OUTPATIENT)
Dept: CALL CENTER | Facility: HOSPITAL | Age: 79
End: 2024-03-25
Payer: MEDICARE

## 2024-03-25 LAB
QT INTERVAL: 425 MS
QTC INTERVAL: 418 MS

## 2024-03-25 NOTE — TELEPHONE ENCOUNTER
Caller: Kamilla Hawthorne    Relationship to patient: Self    Best call back number: [phone]     Chief complaint: SYNCOPE    Type of visit: FU    Requested date: 2 WEEKS      If rescheduling, when is the original appointment: NA     Additional notes: PT WAS CONSULTED BY DR BAH WHILE IN THE HOSPITAL  AN WAS TOLD TO MOVE APPT UP TO TWO WEEKS FROM DISCHARGE - PT WAS ALSO GIVEN ORDERS FOR A STRESS TEST SO PT AND FAMILY INQUIRING IF THEY SHOULD HAVE THAT DONE FIRST OR THE FU - THEY ARE SEEKING ADVISEMENT

## 2024-03-25 NOTE — TELEPHONE ENCOUNTER
Called and left voicemail with patient , Micky.  I have left samples of Eliquis 5 mg for them both

## 2024-03-25 NOTE — OUTREACH NOTE
Call Center TCM Note      Flowsheet Row Responses   [facility] patient discharged from? [place]   Does the patient have one of the following disease processes/diagnoses(primary or secondary)? Other   TCM attempt successful? No   Unsuccessful attempts Attempt 2            Julieth Hicks RN    3/25/2024, 13:34 EDT

## 2024-03-25 NOTE — OUTREACH NOTE
Call Center TCM Note      Flowsheet Row Responses   [facility] patient discharged from? [place]   Does the patient have one of the following disease processes/diagnoses(primary or secondary)? Other   TCM attempt successful? No   Unsuccessful attempts Attempt 1  [Estuardo spouse on verbal release - no answer]            Julieth Hicks RN    3/25/2024, 08:23 EDT

## 2024-03-26 ENCOUNTER — TRANSITIONAL CARE MANAGEMENT TELEPHONE ENCOUNTER (OUTPATIENT)
Dept: CALL CENTER | Facility: HOSPITAL | Age: 79
End: 2024-03-26
Payer: MEDICARE

## 2024-03-26 NOTE — OUTREACH NOTE
Call Center TCM Note      Flowsheet Row Responses   [facility] patient discharged from? [place]   Does the patient have one of the following disease processes/diagnoses(primary or secondary)? Other   TCM attempt successful? No   Unsuccessful attempts Attempt 3            Carlie Mcknight RN    3/26/2024, 15:57 EDT

## 2024-03-27 ENCOUNTER — TREATMENT (OUTPATIENT)
Age: 79
End: 2024-03-27
Payer: MEDICARE

## 2024-03-27 DIAGNOSIS — Z91.81 PERSONAL HISTORY OF FALL: ICD-10-CM

## 2024-03-27 DIAGNOSIS — Z74.09 DECREASED FUNCTIONAL MOBILITY AND ENDURANCE: ICD-10-CM

## 2024-03-27 DIAGNOSIS — M25.562 ACUTE PAIN OF LEFT KNEE: Primary | ICD-10-CM

## 2024-03-27 PROCEDURE — 97530 THERAPEUTIC ACTIVITIES: CPT | Performed by: PHYSICAL THERAPIST

## 2024-03-27 PROCEDURE — 97110 THERAPEUTIC EXERCISES: CPT | Performed by: PHYSICAL THERAPIST

## 2024-03-27 PROCEDURE — 97116 GAIT TRAINING THERAPY: CPT | Performed by: PHYSICAL THERAPIST

## 2024-03-27 NOTE — PROGRESS NOTES
"Physical Therapy Daily Treatment Note    [facility]  [address]  [address]  [place] [address]     Patient: Kamilla Hawthorne   : 1945  Referring practitioner: Sofiya Beltrán MD  Date of Initial Visit: Type: THERAPY  Noted: 2024  Today's Date: 3/27/2024  Patient seen for 12 sessions         Kamilla Hawthorne reports: had an episode over the weekend where she \"blanked\" out at dinner and had to go to stay the night in the hospital for observation.   Said it was a \"syncope\" episode but she did not fall or hit head.        Subjective     Objective   -ambulates in/out of clinic without assistive device and normalized step/stride length    See Exercise, Manual, and Modality Logs for complete treatment.   Exercise rationale/ pain free exercise performance  Anatomy and structure of affected musculature  Proper shoe wear/support  Verbal/Tactile cues to ensure correct exercise performance/technique          Assessment/Plan  Compliant/Cooperative with rehab efforts this session.  Subjectively reports no increased symptoms or discomfort with therapeutic exercise today.  Able to perform increased exercise/functional activity without increased LE symptoms or noted LOB episodes with in clinic and outside ambulation as well as stair performance. Benefits from verbal/tactile cues to ensure proper sequencing and exercise performance/technique.          Progress per Plan of Care and Progress strengthening /stabilization /functional activity           Timed:  Manual Therapy:         mins  90227;  Therapeutic Exercise:    25    mins  98703;     Neuromuscular Shelly:        mins  94374;    Therapeutic Activity:      15    mins  01212;     Gait Trainin    mins  70086;     Ultrasound:          mins  28607;    Self Care                    _    mins 29946    Untimed:  Electrical Stimulation:         mins  79812 ( );  Mechanical Traction:         mins  81555;     Timed Treatment:  48  "   mins   Total Treatment:      48  mins  [place], PTA  Physical Therapist  Assistant  [id number]

## 2024-03-28 ENCOUNTER — TELEPHONE (OUTPATIENT)
Dept: INTERNAL MEDICINE | Facility: CLINIC | Age: 79
End: 2024-03-28
Payer: MEDICARE

## 2024-03-28 NOTE — TELEPHONE ENCOUNTER
Caller: Kamilla Hawthorne    Relationship to patient: Self    Best call back number: [phone]    Patient is needing: PATIENT CALLED AND STATED THAT SHE WAS OK WITH MOVING HER APPOINTMENT TO 3 PM    HUB RESCHEDULED THE APPOINTMENT

## 2024-04-02 ENCOUNTER — OFFICE VISIT (OUTPATIENT)
Dept: INTERNAL MEDICINE | Facility: CLINIC | Age: 79
End: 2024-04-02
Payer: MEDICARE

## 2024-04-02 VITALS
DIASTOLIC BLOOD PRESSURE: 66 MMHG | TEMPERATURE: 98 F | WEIGHT: 157 LBS | OXYGEN SATURATION: 98 % | BODY MASS INDEX: 25.23 KG/M2 | RESPIRATION RATE: 16 BRPM | HEIGHT: 66 IN | SYSTOLIC BLOOD PRESSURE: 120 MMHG | HEART RATE: 57 BPM

## 2024-04-02 DIAGNOSIS — R55 SYNCOPE, UNSPECIFIED SYNCOPE TYPE: Primary | ICD-10-CM

## 2024-04-02 NOTE — PROGRESS NOTES
Transitional Care Follow Up Visit  Subjective     Kamilla Hawthorne is a 78 y.o. female who presents for a transitional care management visit.    Within 48 business hours after discharge our office contacted her via telephone to coordinate her care and needs.      I reviewed and discussed the details of that call along with the discharge summary, hospital problems, inpatient lab results, inpatient diagnostic studies, and consultation reports with Kamilla.     Current outpatient and discharge medications have been reconciled for the patient.  Reviewed by: Ishmael Medellin MD          3/26/2024     4:04 PM   Date of TCM Phone Call   [facility]   Date of Admission 3/23/2024   Date of Discharge 3/24/2024   Discharge Disposition Home or Self Care     Risk for Readmission (LACE) Score: 3 (3/24/2024  6:00 AM)      History of Present Illness  Kamilla Hawthorne is a 78 y.o. female with a medical history of atrial fibrillation, hypertension, lung mass who presented to the ED on 3/23/2024 c/o acute syncopal episode.  She felt poorly and hot and sweaty and  sat her in a chair and went into the other room to call 911.  When he came back he found her unresponsive.  She was still sitting in the chair.  No loss of posture.  When the fire department arrived patient remained unresponsive.  EMS states that patient slowly began to wake up and answer some questions, almost as if she was in a postictal state.  She denies history of seizure.  She states that she recalls feeling hot at the dinner table but denies feeling lightheaded or having other symptoms for this occurred.  She had been feeling well all day.  She was back to normal by the time she got to the emergency room.     Course During Hospital Stay:  Cardiac monitoring  -Vital signs every 4 hours  -Continuous pulse ox  -High Sensitivity Troponin 23, repeat in a.m.  -EKG sinus rhythm  -Consult cardiology, Dr. Torres  -Reduce amlodipine to 2.5 mg daily,  take this at night  -Keep log of blood pressure at home, bring log to follow-up appointment with cardiology  -Outpatient stress test  -Follow-up with cardiology in 2 weeks     The following portions of the patient's history were reviewed and updated as appropriate: allergies, current medications, past medical history, and problem list.    Review of Systems   Constitutional:  Negative for chills and fever.   Respiratory:  Negative for shortness of breath and wheezing.    Cardiovascular:  Negative for chest pain, palpitations and leg swelling.       Objective   Physical Exam  Constitutional:       Appearance: Normal appearance.   Cardiovascular:      Rate and Rhythm: Normal rate.      Heart sounds: Normal heart sounds. No murmur heard.     No gallop.   Pulmonary:      Effort: Pulmonary effort is normal. No respiratory distress.      Breath sounds: Normal breath sounds. No wheezing or rales.   Neurological:      Mental Status: She is alert.         Assessment & Plan   Diagnoses and all orders for this visit:    1. Syncope, unspecified syncope type (Primary)      In today for follow-up from hospital discharge 10 days ago.  She was admitted with syncope.  Fairly classic's vasovagal episode.  She had another syncopal spell 9 months ago.  She has had some borderline syncopal spells over the years.  Evaluation in the hospital was unrevealing.  She was seen by cardiology and discharged.  She is set up for a regadenoson stress test on 4/9/2024.  The issue at this point is whether she even needs to have an ischemic evaluation.  Family is concerned about the possibility of injury and bleeding since she takes Eliquis.  She has paroxysmal atrial fibrillation.  Unfortunately that is a problem without a good solution.  She is always going to have some risk of vasovagal episodes.  Blood pressure is 120/66.  Will also check an orthostatic blood pressure today.  She has a Zio patch scheduled as well.  That is currently in place.  EKG  from hospital reviewed independently by me and looks normal from 3/23/2024.  Lab work also reviewed including CBC and CMP.  Also serial troponins.  Medications also reviewed today including her metoprolol dose which seems satisfactory for the time being.    The above information was reviewed again today 04/02/24.  It continues to be accurate as reflected above and is unchanged.  History, physical and review of systems all reviewed and are unchanged.  Medications were reviewed today and continue the current dosing.

## 2024-04-03 ENCOUNTER — TREATMENT (OUTPATIENT)
Age: 79
End: 2024-04-03
Payer: MEDICARE

## 2024-04-03 DIAGNOSIS — Z91.81 PERSONAL HISTORY OF FALL: ICD-10-CM

## 2024-04-03 DIAGNOSIS — M25.562 ACUTE PAIN OF LEFT KNEE: Primary | ICD-10-CM

## 2024-04-03 DIAGNOSIS — Z74.09 DECREASED FUNCTIONAL MOBILITY AND ENDURANCE: ICD-10-CM

## 2024-04-03 NOTE — PROGRESS NOTES
Physical Therapy Daily Treatment Note  [address]  [facility] [address]  P: [phone]  F: [phone]    Patient: Kamilla Hawthorne   : 1945  Referring practitioner: Sofiya Beltrán MD  Date of Initial Visit: Type: THERAPY  Noted: 2024  Today's Date: 4/3/2024  Patient seen for 13 sessions       Visit Diagnoses:    ICD-10-CM ICD-9-CM   1. Acute pain of left knee  M25.562 719.46   2. Personal history of fall  Z91.81 V15.88   3. Decreased functional mobility and endurance  Z74.09 780.99       Kamilla Hawthorne reports:     Subjective   Pt reports that her knee is feeling pretty good today, but ever since being in the hospital she feels like she has trouble getting out of the bed and her seat.   Objective   See Exercise, Manual, and Modality Logs for complete treatment.       Assessment/Plan  Pt able to perform supine to sit transfer independently with minimal tactile cueing and required only verbal cueing. She also did a great job with ambulation around the clinic and over mini obstacles with single tactile cue for balance over obstacles. She did have some increased difficulty with sit to stand transfer without upper extremity support, and benefits from increased height to assist. Continue to strengthen lower extremities to improve independence with functional tasks.    Timed:         Manual Therapy:    0     mins  97769;     Therapeutic Exercise:    26     mins  78449;     Neuromuscular Shelly:    0    mins  25978;    Therapeutic Activity:     12     mins  61680;     Gait Trainin     mins  60115;     Ultrasound:     0     mins  06508;    Ionto                               0    mins   73225  Self Care                       0     mins   93367  Canalith Repos    0     mins 00883      Un-Timed:  Electrical Stimulation:    0     mins  30810 ( );  Dry Needling     0     mins self-pay  Traction     0     mins 68910      Timed Treatment:   38   mins   Total Treatment:     38    mins    Amy Rosenthal, PT  [place] License: [id number]

## 2024-04-08 ENCOUNTER — OFFICE VISIT (OUTPATIENT)
Dept: CARDIOLOGY | Facility: CLINIC | Age: 79
End: 2024-04-08
Payer: MEDICARE

## 2024-04-08 VITALS
DIASTOLIC BLOOD PRESSURE: 77 MMHG | BODY MASS INDEX: 25.75 KG/M2 | HEIGHT: 66 IN | WEIGHT: 160.2 LBS | SYSTOLIC BLOOD PRESSURE: 122 MMHG | OXYGEN SATURATION: 96 % | HEART RATE: 56 BPM

## 2024-04-08 DIAGNOSIS — E78.2 MIXED HYPERLIPIDEMIA: ICD-10-CM

## 2024-04-08 DIAGNOSIS — I10 PRIMARY HYPERTENSION: ICD-10-CM

## 2024-04-08 DIAGNOSIS — I48.0 PAROXYSMAL ATRIAL FIBRILLATION: Primary | ICD-10-CM

## 2024-04-08 PROCEDURE — 1159F MED LIST DOCD IN RCRD: CPT | Performed by: NURSE PRACTITIONER

## 2024-04-08 PROCEDURE — 3078F DIAST BP <80 MM HG: CPT | Performed by: NURSE PRACTITIONER

## 2024-04-08 PROCEDURE — 3074F SYST BP LT 130 MM HG: CPT | Performed by: NURSE PRACTITIONER

## 2024-04-08 PROCEDURE — 99214 OFFICE O/P EST MOD 30 MIN: CPT | Performed by: NURSE PRACTITIONER

## 2024-04-08 PROCEDURE — 1160F RVW MEDS BY RX/DR IN RCRD: CPT | Performed by: NURSE PRACTITIONER

## 2024-04-08 RX ORDER — AMLODIPINE BESYLATE 2.5 MG/1
TABLET ORAL
Qty: 90 TABLET | Refills: 1 | Status: SHIPPED | OUTPATIENT
Start: 2024-04-08

## 2024-04-08 NOTE — PROGRESS NOTES
Date of Office Visit: 24  Encounter Provider: BELLA Mata  Place of Service: [facility] CARDIOLOGY  Patient Name: Kamilla Hawthorne  :1945    Chief Complaint   Patient presents with    Follow-up   :     HPI: Kamilla Hawthorne is a 78 y.o. female  with hypertension, hyperlipidemia, pulmonary nodule, syncope, paroxysmal atrial fibrillation.  History of tobacco use     She is followed by Dr. Estuardo Lazaro.  I will visit with her in follow-up and have reviewed her medical record.     She was hospitalized in 2023 after having recurrent near syncope which escalated to her passing out presenting to the ER on 2023.  She was standing and suddenly felt clammy and dizzy.  She walked to the kitchen and check her blood pressure and another piece of furniture for support but she fell and hit her head.  She denied loss of consciousness or syncopal episode.  Her  came and lifted her head to a seated position and then she passed out.  They called EMS and she was unconscious for about 5 minutes reportedly.  Per EMS her blood pressure was 80 systolic and she was given IV fluid bolus.  She had elevated D-dimer and troponin of 33.  She suffered a left frontal scalp hematoma.  Is found to have a right upper lobe mass.  Bronchoscopy was scheduled.  She also had evidence of paroxysmal atrial fibrillation that was new for her.  She had no evidence of pulmonary embolism.  Echocardiogram showed normal left ventricular systolic function, normal biatrial size and no significant valve disease.  She had robotic bronchoscopy which was positive for non-small cell cancer.      She then was hospitalized after having an unresponsive episode.  She was reportedly sitting at the table eating dinner with her  and he left the room but when he returned he found her sitting in the chair unresponsive.  When EMS arrived she remained unresponsive.  There was concern of possible postictal  "state.  High-sensitivity troponin was 23, 23 and then 27 with a nonacute chest x-ray.  Cardiology was consulted and amlodipine was decreased to 2.5 mg daily to be taken at night.  Zio patch was placed and she was to have an outpatient perfusion stress test.    She presents today for reassessment.  She canceled the stress test because she has not had any chest pain.  She put the Zio patch in the mailbox this morning.  She has no dizziness since hospitalization.  No edema or shortness of breath or chest pain or tightness or pressure.  She denies palpitations.      Allergies   Allergen Reactions    Penicillins Itching           Family and social history reviewed.     ROS  All other systems were reviewed and are negative          Objective:     Vitals:    04/08/24 0938   BP: 122/77   BP Location: Left arm   Patient Position: Sitting   Cuff Size: Adult   Pulse: 56   SpO2: 96%   Weight: 72.7 kg (160 lb 3.2 oz)   Height: 167.6 cm (66\")     Body mass index is 25.86 kg/m².    PHYSICAL EXAM:  Pulmonary:      Effort: Pulmonary effort is normal.      Breath sounds: Normal breath sounds.   Cardiovascular:      Normal rate. Regular rhythm.         Procedures      Current Outpatient Medications   Medication Sig Dispense Refill    acetaminophen (TYLENOL) 500 MG tablet Take 1 tablet by mouth Every 6 (Six) Hours As Needed. Indications: Pain      amLODIPine (NORVASC) 2.5 MG tablet TAKE 1 TABLET BY MOUTH DAILY. 2.5 MG DAILY INDICATIONS: HIGH BLOOD PRESSURE DISORDER 90 tablet 1    atorvastatin (LIPITOR) 10 MG tablet TAKE 1 TABLET BY MOUTH EVERY DAY 90 tablet 1    cholecalciferol (VITAMIN D3) 1000 units tablet Take 1 tablet by mouth Daily. Indications: Vitamin D Deficiency      dorzolamide-timolol (COSOPT) 22.3-6.8 MG/ML ophthalmic solution INSTILL 1 DROP BY OPHTHALMIC ROUTE 2 TIMES EVERY DAY INTO BOTH EYES  6    Eliquis 5 MG tablet tablet TAKE 1 TABLET BY MOUTH 2 (TWO) TIMES A DAY. INDICATIONS: ATRIAL FIBRILLATION 180 tablet 1    " latanoprost (XALATAN) 0.005 % ophthalmic solution INSTILL 1 DROP BY OPHTHALMIC ROUTE EVERY DAY BOTH EYES AT BEDTIME  6    metoprolol tartrate (LOPRESSOR) 25 MG tablet TAKE 0.5 TABLETS BY MOUTH EVERY 12 (TWELVE) HOURS. INDICATIONS: HIGH BLOOD PRESSURE DISORDER 90 tablet 1    valsartan (DIOVAN) 80 MG tablet TAKE 1 TABLET BY MOUTH DAILY FOR HIGH BLOOD PRESSURE 90 tablet 1    vitamin C (ASCORBIC ACID) 500 MG tablet Take 1 tablet by mouth Daily. Indications: Inadequate Vitamin C       No current facility-administered medications for this visit.     Assessment:       Diagnosis Plan   1. Paroxysmal atrial fibrillation        2. Primary hypertension        3. Mixed hyperlipidemia             No orders of the defined types were placed in this encounter.        Plan:       1.  78-year-old female with paroxysmal atrial fibrillation.  CHADS2 Vascor of 4 she is on metoprolol tartrate 12.5 mg twice daily along with Eliquis 5 mg twice daily.   2. Hypertension blood pressure is stable.  Continue current regimen  3.  Hyperlipidemia on atorvastatin 10 mg  4.  Atherosclerotic disease involving the abdominal aorta  5.  Proximal AAA measuring 2.2 cm which is mildly enlarged but no aneurysmal dilation on ultrasound aorta June 2023  6.  Coronary artery calcification noted on CT chest-on statin therapy.  She declines perfusion stress test at this time.  She has no chest pain or tightness or pressure or shortness of breath.  7.  Left knee issue-wearing a brace and in physical therapy currently.  She is ambulating with a cane  8.  Intermittent diarrhea-encouraged to increase fluid hydration  9.Right upper lobe mass non-small cell lung cancer.  She completed radiation.  She continues to follow with radiation oncology  10.  Syncope March 2024.  This occurred when patient was sitting at the dinner table.  Amlodipine was decreased as it was felt that low blood pressure might be contributing.  She likely has some orthostasis component.  2 weeks  Zio patch pending.  We discussed conservative measures such as staying hydrated, wearing compression stockings, sitting or lying down if feeling dizzy or lightheaded or as if she will pass out.    Will call once we have the results of the Zio patch.    Addendum-patient's daughter, Amie Valderrama has given updated patient's amlodipine was 2.5 mg prior to admission.  I have advised to continue current regimen and we will review her Zio patch results once available.        It has been a pleasure to participate in this patient's care.      Thank you,  BELLA Mata      **I used Dragon to dictate this note:**

## 2024-04-10 ENCOUNTER — TREATMENT (OUTPATIENT)
Age: 79
End: 2024-04-10
Payer: MEDICARE

## 2024-04-10 DIAGNOSIS — M25.562 ACUTE PAIN OF LEFT KNEE: Primary | ICD-10-CM

## 2024-04-10 DIAGNOSIS — Z91.81 PERSONAL HISTORY OF FALL: ICD-10-CM

## 2024-04-10 DIAGNOSIS — Z74.09 DECREASED FUNCTIONAL MOBILITY AND ENDURANCE: ICD-10-CM

## 2024-04-10 NOTE — PROGRESS NOTES
"Physical Therapy Daily Treatment Note  [address]  [address]  [facility] [address]  P: [phone]  F: [phone]    Patient: Kamilla Hawthorne   : 1945  Referring practitioner: Sofiya Beltrán MD  Date of Initial Visit: Type: THERAPY  Noted: 2024  Today's Date: 4/10/2024  Patient seen for 14 sessions       Visit Diagnoses:    ICD-10-CM ICD-9-CM   1. Acute pain of left knee  M25.562 719.46   2. Personal history of fall  Z91.81 V15.88   3. Decreased functional mobility and endurance  Z74.09 780.99       Kamilla Hawthorne reports:     Subjective   Pt denies light headedness/dizziness. Pt reports that her knee is feeling \"good\".   Objective   See Exercise, Manual, and Modality Logs for complete treatment.       Assessment/Plan  Pt benefits from one Viviana during sit to stand transfer initially, pt benefits from increased thigh support and verbal cueing to stand without UE support from a lower height. She did a great job the last four reps with good eccentric control on return from transfer. Benefits from continued gluteal strength to continue to improve transfers from various heights and safety when walking over obstacles when step height increased. Continue POC.     Timed:         Manual Therapy:    0     mins  74403;     Therapeutic Exercise:    29     mins  69955;     Neuromuscular Shelly:    0    mins  02015;    Therapeutic Activity:     9     mins  95467;     Gait Trainin     mins  83130;     Ultrasound:     0     mins  27888;    Ionto                               0    mins   72081  Self Care                       0     mins   76652  Canalith Repos    0     mins 67137      Un-Timed:  Electrical Stimulation:    0     mins  51882 ( );  Dry Needling     0     mins self-pay  Traction     0     mins 26872      Timed Treatment:   38   mins   Total Treatment:     38   mins    Amy Rosenthal, PT  [place] License: [id number]                  "

## 2024-04-16 ENCOUNTER — TELEPHONE (OUTPATIENT)
Dept: CARDIOLOGY | Facility: CLINIC | Age: 79
End: 2024-04-16
Payer: MEDICARE

## 2024-04-16 NOTE — TELEPHONE ENCOUNTER
Please inform patient her monitor was benign showing no rhythm changes that could have contributed to her syncopal spell

## 2024-04-16 NOTE — TELEPHONE ENCOUNTER
I spoke with Kamilla Hawthorne and updated pt on results from provider.  They verbalized understanding and have no further questions at this time.    Thank you,    Rosangela DENNEY, RN  Triage [facility] [place] [facility]  04/16/24 08:46 EDT

## 2024-04-17 ENCOUNTER — TREATMENT (OUTPATIENT)
Age: 79
End: 2024-04-17
Payer: MEDICARE

## 2024-04-17 DIAGNOSIS — Z74.09 DECREASED FUNCTIONAL MOBILITY AND ENDURANCE: ICD-10-CM

## 2024-04-17 DIAGNOSIS — Z91.81 PERSONAL HISTORY OF FALL: ICD-10-CM

## 2024-04-17 DIAGNOSIS — M25.562 ACUTE PAIN OF LEFT KNEE: Primary | ICD-10-CM

## 2024-04-17 NOTE — PROGRESS NOTES
"Physical Therapy Daily Treatment Note  [address]  [facility] [address]  P: [phone]  F: [phone]    Patient: Kamilla Hawthorne   : 1945  Referring practitioner: Sofiya Beltrán MD  Date of Initial Visit: Type: THERAPY  Noted: 2024  Today's Date: 2024  Patient seen for 15 sessions       Visit Diagnoses:    ICD-10-CM ICD-9-CM   1. Acute pain of left knee  M25.562 719.46   2. Personal history of fall  Z91.81 V15.88   3. Decreased functional mobility and endurance  Z74.09 780.99       Kamilla Hawthorne reports:     Subjective   Pt reports that her knee is feeling \"good\". She reports she has difficulty getting up from a low seat.   Objective   See Exercise, Manual, and Modality Logs for complete treatment.   Progressed: mini to higher hurdles to improve step height and toe clearance    CGA at gait belt for stair negotiation and sit to stand from low seat for LE strengthening   Assessment/Plan  Pt educated on how sit <> stand without UE push off beneficial for functional strengthening, but at home to use hands for safety. She demonstrates good understanding. Pt had no reports of left knee pain throughout therapeutic interventions. She did a really great job of responding to verbal cue on increasing bilateral hip and knee flexion when stepping over michelle with an increased height, CGA at gait belt provided for safety. Continue to progress towards goals.     Timed:         Manual Therapy:    0     mins  76067;     Therapeutic Exercise:    25     mins  15846;     Neuromuscular Shelly:    0    mins  77278;    Therapeutic Activity:     15     mins  12942;     Gait Trainin     mins  32387;     Ultrasound:     0     mins  37048;    Ionto                               0    mins   28590  Self Care                       0     mins   08226  Canalith Repos    0     mins 20000      Un-Timed:  Electrical Stimulation:    0     mins  14220 ( );  Dry Needling     0     mins " self-pay  Traction     0     mins 03926      Timed Treatment:   40   mins   Total Treatment:     40   mins    Amy Rosenthal, PT  [place] License: [id number]

## 2024-04-24 ENCOUNTER — TREATMENT (OUTPATIENT)
Age: 79
End: 2024-04-24
Payer: MEDICARE

## 2024-04-24 DIAGNOSIS — Z74.09 DECREASED FUNCTIONAL MOBILITY AND ENDURANCE: ICD-10-CM

## 2024-04-24 DIAGNOSIS — Z91.81 PERSONAL HISTORY OF FALL: ICD-10-CM

## 2024-04-24 DIAGNOSIS — M25.562 ACUTE PAIN OF LEFT KNEE: Primary | ICD-10-CM

## 2024-04-24 PROCEDURE — 97110 THERAPEUTIC EXERCISES: CPT | Performed by: PHYSICAL THERAPIST

## 2024-04-24 PROCEDURE — 97530 THERAPEUTIC ACTIVITIES: CPT | Performed by: PHYSICAL THERAPIST

## 2024-04-24 NOTE — PROGRESS NOTES
"Physical Therapy Daily Treatment Note  [address]  [facility] [address]  P: [phone]  F: [phone]    Patient: Kamilla Hawthorne   : 1945  Referring practitioner: Sofiya Beltrán MD  Date of Initial Visit: Type: THERAPY  Noted: 2024  Today's Date: 2024  Patient seen for 16 sessions       Visit Diagnoses:    ICD-10-CM ICD-9-CM   1. Acute pain of left knee  M25.562 719.46   2. Personal history of fall  Z91.81 V15.88   3. Decreased functional mobility and endurance  Z74.09 780.99       Kamilla Hawthorne reports:     Subjective   Pt reports \"My knee is feeling good\" \"I went to the grocery to get some things and flowers and I drove the cart just fine\"  Objective   See Exercise, Manual, and Modality Logs for complete treatment.       Assessment/Plan  Pt was able to perform sit<>stand transfer from a lower height with no assistance safely and independently. Pt reminded that at home, if transferring from a softer surface such as chair/couch to use hands for safety with transfer. She demonstrates good understanding. She is ambulating in clinic without SPC and doing very well. She went to the grocery store per subjective report which was one of her goals at the start of physical therapy. She continues to benefit from v/c and t/c to ensure proper exercise performance. Continue to progress towards goals and continue to progress towards discharge. No reports of knee pain in therapeutic interventions.     Timed:         Manual Therapy:    0     mins  38754;     Therapeutic Exercise:    26     mins  84327;     Neuromuscular Shelly:    0    mins  70035;    Therapeutic Activity:     16     mins  45303;     Gait Trainin     mins  68389;     Ultrasound:     0     mins  79731;    Ionto                               0    mins   17329  Self Care                       0     mins   52018  Canalith Repos    0     mins 21275      Un-Timed:  Electrical Stimulation:    0     mins  64997 (MC " );  Dry Needling     0     mins self-pay  Traction     0     mins 35022      Timed Treatment:   42   mins   Total Treatment:     42   mins    Amy Rosenthal, PT  [place] License: [id number]

## 2024-04-29 ENCOUNTER — OFFICE VISIT (OUTPATIENT)
Dept: INTERNAL MEDICINE | Facility: CLINIC | Age: 79
End: 2024-04-29
Payer: MEDICARE

## 2024-04-29 ENCOUNTER — LAB (OUTPATIENT)
Dept: LAB | Facility: HOSPITAL | Age: 79
End: 2024-04-29
Payer: MEDICARE

## 2024-04-29 VITALS
SYSTOLIC BLOOD PRESSURE: 108 MMHG | RESPIRATION RATE: 16 BRPM | WEIGHT: 160 LBS | OXYGEN SATURATION: 100 % | DIASTOLIC BLOOD PRESSURE: 68 MMHG | BODY MASS INDEX: 25.71 KG/M2 | HEART RATE: 83 BPM | HEIGHT: 66 IN

## 2024-04-29 DIAGNOSIS — I10 ESSENTIAL HYPERTENSION: Primary | Chronic | ICD-10-CM

## 2024-04-29 DIAGNOSIS — E78.2 MIXED HYPERLIPIDEMIA: Chronic | ICD-10-CM

## 2024-04-29 DIAGNOSIS — I48.0 PAROXYSMAL ATRIAL FIBRILLATION: ICD-10-CM

## 2024-04-29 LAB
CHOLEST SERPL-MCNC: 185 MG/DL (ref 0–200)
HDLC SERPL QL: 1.99
HDLC SERPL-MCNC: 93 MG/DL (ref 40–60)
LDLC SERPL CALC-MCNC: 80 MG/DL (ref 0–100)
TRIGL SERPL-MCNC: 65 MG/DL (ref 0–150)
VLDLC SERPL-MCNC: 12 MG/DL (ref 5–40)

## 2024-04-29 PROCEDURE — 99214 OFFICE O/P EST MOD 30 MIN: CPT | Performed by: INTERNAL MEDICINE

## 2024-04-29 PROCEDURE — 36415 COLL VENOUS BLD VENIPUNCTURE: CPT | Performed by: INTERNAL MEDICINE

## 2024-04-29 PROCEDURE — 3074F SYST BP LT 130 MM HG: CPT | Performed by: INTERNAL MEDICINE

## 2024-04-29 PROCEDURE — 80061 LIPID PANEL: CPT | Performed by: INTERNAL MEDICINE

## 2024-04-29 PROCEDURE — 1159F MED LIST DOCD IN RCRD: CPT | Performed by: INTERNAL MEDICINE

## 2024-04-29 PROCEDURE — 3078F DIAST BP <80 MM HG: CPT | Performed by: INTERNAL MEDICINE

## 2024-04-29 PROCEDURE — 1160F RVW MEDS BY RX/DR IN RCRD: CPT | Performed by: INTERNAL MEDICINE

## 2024-04-29 NOTE — PROGRESS NOTES
Subjective   Kamilla Hawthorne is a 78 y.o. female.     Chief Complaint   Patient presents with    Hypertension    Atrial Fibrillation    Hyperlipidemia         Hypertension  This is a chronic problem. Pertinent negatives include no chest pain, palpitations or shortness of breath.   Atrial Fibrillation  Presents for follow-up visit. Symptoms are negative for chest pain, palpitations and shortness of breath. Past medical history includes atrial fibrillation and hyperlipidemia.   Hyperlipidemia  This is a chronic problem. Pertinent negatives include no chest pain or shortness of breath.        The following portions of the patient's history were reviewed and updated as appropriate: allergies, current medications, past social history and problem list.    Outpatient Medications Marked as Taking for the 4/29/24 encounter (Office Visit) with Ishmael Medellin MD   Medication Sig Dispense Refill    acetaminophen (TYLENOL) 500 MG tablet Take 1 tablet by mouth Every 6 (Six) Hours As Needed. Indications: Pain      amLODIPine (NORVASC) 2.5 MG tablet TAKE 1 TABLET BY MOUTH DAILY. 2.5 MG DAILY INDICATIONS: HIGH BLOOD PRESSURE DISORDER 90 tablet 1    atorvastatin (LIPITOR) 10 MG tablet TAKE 1 TABLET BY MOUTH EVERY DAY 90 tablet 1    cholecalciferol (VITAMIN D3) 1000 units tablet Take 1 tablet by mouth Daily. Indications: Vitamin D Deficiency      dorzolamide-timolol (COSOPT) 22.3-6.8 MG/ML ophthalmic solution INSTILL 1 DROP BY OPHTHALMIC ROUTE 2 TIMES EVERY DAY INTO BOTH EYES  6    Eliquis 5 MG tablet tablet TAKE 1 TABLET BY MOUTH 2 (TWO) TIMES A DAY. INDICATIONS: ATRIAL FIBRILLATION 180 tablet 1    latanoprost (XALATAN) 0.005 % ophthalmic solution INSTILL 1 DROP BY OPHTHALMIC ROUTE EVERY DAY BOTH EYES AT BEDTIME  6    metoprolol tartrate (LOPRESSOR) 25 MG tablet TAKE 0.5 TABLETS BY MOUTH EVERY 12 (TWELVE) HOURS. INDICATIONS: HIGH BLOOD PRESSURE DISORDER 90 tablet 1    valsartan (DIOVAN) 80 MG tablet TAKE 1 TABLET BY MOUTH  DAILY FOR HIGH BLOOD PRESSURE 90 tablet 1    vitamin C (ASCORBIC ACID) 500 MG tablet Take 1 tablet by mouth Daily. Indications: Inadequate Vitamin C         Review of Systems   Respiratory:  Negative for cough, shortness of breath and wheezing.    Cardiovascular:  Negative for chest pain, palpitations and leg swelling.   Gastrointestinal:  Negative for abdominal pain, constipation, diarrhea and nausea.       Objective   Vitals:    04/29/24 1301   BP: 108/68   Pulse: 83   Resp: 16   SpO2: 100%      Wt Readings from Last 3 Encounters:   04/29/24 72.6 kg (160 lb)   04/08/24 72.7 kg (160 lb 3.2 oz)   04/02/24 71.2 kg (157 lb)    Body mass index is 25.82 kg/m².      Physical Exam  Constitutional:       Appearance: Normal appearance. She is well-developed.   Neck:      Thyroid: No thyromegaly.   Cardiovascular:      Rate and Rhythm: Normal rate. Rhythm irregularly irregular.      Heart sounds: Normal heart sounds. No murmur heard.     No gallop.   Pulmonary:      Effort: Pulmonary effort is normal. No respiratory distress.      Breath sounds: Normal breath sounds. No wheezing or rales.   Neurological:      Mental Status: She is alert.           Problems Addressed this Visit          Cardiac and Vasculature    Mixed hyperlipidemia (Chronic)    Essential hypertension - Primary (Chronic)    Paroxysmal atrial fibrillation     Diagnoses         Codes Comments    Essential hypertension    -  Primary ICD-10-CM: I10  ICD-9-CM: 401.9     Mixed hyperlipidemia     ICD-10-CM: E78.2  ICD-9-CM: 272.2     Paroxysmal atrial fibrillation     ICD-10-CM: I48.0  ICD-9-CM: 427.31           Assessment & Plan   In for recheck of hypertension and hyperlipidemia today April 2024.  She had a syncopal episode 1 month ago.  She has had no further symptoms.  2-week Zio patch was unrevealing.  Blood pressure is running on the low side today.  Will discontinue amlodipine.  She will continue on valsartan 80 mg daily and metoprolol 25 mg twice daily for  hypertension and those are reviewed today.  Gets annual lab work in March 2025.  Annual preventive exam and annual wellness visit October 2024.  She had a normal bone scan in 2016 and hence not need to repeat until 2031.  Recheck lipids today and every 6 months.  Follow-up 6 months.  She is 3 HPP today.    The above information was reviewed again today 04/29/24.  It continues to be accurate as reflected above and is unchanged.  History, physical and review of systems all reviewed and are unchanged.  Medications were reviewed today and continue the current dosing.               Dragon disclaimer:   Much of this encounter note is an electronic transcription/translation of spoken language to printed text. The electronic translation of spoken language may permit erroneous, or at times, nonsensical words or phrases to be inadvertently transcribed; Although I have reviewed the note for such errors, some may still exist.

## 2024-05-01 ENCOUNTER — TREATMENT (OUTPATIENT)
Age: 79
End: 2024-05-01
Payer: MEDICARE

## 2024-05-01 DIAGNOSIS — Z74.09 DECREASED FUNCTIONAL MOBILITY AND ENDURANCE: ICD-10-CM

## 2024-05-01 DIAGNOSIS — M25.562 ACUTE PAIN OF LEFT KNEE: Primary | ICD-10-CM

## 2024-05-01 DIAGNOSIS — Z91.81 PERSONAL HISTORY OF FALL: ICD-10-CM

## 2024-05-01 PROCEDURE — 97110 THERAPEUTIC EXERCISES: CPT | Performed by: PHYSICAL THERAPIST

## 2024-05-01 PROCEDURE — 97530 THERAPEUTIC ACTIVITIES: CPT | Performed by: PHYSICAL THERAPIST

## 2024-05-01 PROCEDURE — 97116 GAIT TRAINING THERAPY: CPT | Performed by: PHYSICAL THERAPIST

## 2024-05-01 NOTE — PROGRESS NOTES
Physical Therapy Daily Treatment Note  [address]  [facility] [address]  P: [phone]  F: [phone]    Patient: Kamilla Hawthorne   : 1945  Referring practitioner: Sofiya Beltrán MD  Date of Initial Visit: Type: THERAPY  Noted: 2024  Today's Date: 2024  Patient seen for 17 sessions       Visit Diagnoses:    ICD-10-CM ICD-9-CM   1. Acute pain of left knee  M25.562 719.46   2. Personal history of fall  Z91.81 V15.88   3. Decreased functional mobility and endurance  Z74.09 780.99       Kamilla Hawthorne reports:     Subjective   Pt reports that she walked a lot at her grandson's soccer game and her knee did good, but that she was very tired.   Objective   See Exercise, Manual, and Modality Logs for complete treatment.   Ambulates into clinic with SPC    Ambulates throughout clinic without SPC and SBA    Assessment/Plan  PT able to perform six sit to stand transfers without her hands from a lower height and with good eccentric control. Pt able to ambulate safely and independently throughout clinic with good step height and length with no issues with toe clearance. Instructed to continue to use her cane in crowded areas for safety. Pt did a very good job descending stairs with one hand rail and alternating lower extremities, she had no instances of left knee buckling. Re-assess next visit, anticipate discharge.     Timed:         Manual Therapy:    0     mins  61132;     Therapeutic Exercise:    30     mins  04904;     Neuromuscular Shelly:    0    mins  30531;    Therapeutic Activity:     15     mins  92289;     Gait Trainin     mins  28715;     Ultrasound:     0     mins  91796;    Ionto                               0    mins   33975  Self Care                       0     mins   70713  Canalith Repos    0     mins 11163      Un-Timed:  Electrical Stimulation:    0     mins  77875 ( );  Dry Needling     0     mins self-pay  Traction     0     mins  17000      Timed Treatment:   54   mins   Total Treatment:     54   mins    Amy Rosenthal, PT  [place] License: [id number]

## 2024-05-08 ENCOUNTER — TREATMENT (OUTPATIENT)
Age: 79
End: 2024-05-08
Payer: MEDICARE

## 2024-05-08 DIAGNOSIS — Z91.81 PERSONAL HISTORY OF FALL: ICD-10-CM

## 2024-05-08 DIAGNOSIS — M25.562 ACUTE PAIN OF LEFT KNEE: Primary | ICD-10-CM

## 2024-05-08 DIAGNOSIS — Z74.09 DECREASED FUNCTIONAL MOBILITY AND ENDURANCE: ICD-10-CM

## 2024-05-08 PROCEDURE — 97530 THERAPEUTIC ACTIVITIES: CPT | Performed by: PHYSICAL THERAPIST

## 2024-05-08 PROCEDURE — 97110 THERAPEUTIC EXERCISES: CPT | Performed by: PHYSICAL THERAPIST

## 2024-05-09 ENCOUNTER — HOSPITAL ENCOUNTER (OUTPATIENT)
Facility: HOSPITAL | Age: 79
Discharge: HOME | End: 2024-05-09
Admitting: STUDENT IN AN ORGANIZED HEALTH CARE EDUCATION/TRAINING PROGRAM
Payer: MEDICARE

## 2024-05-09 DIAGNOSIS — C34.11 PRIMARY CANCER OF RIGHT UPPER LOBE OF LUNG: ICD-10-CM

## 2024-05-09 PROCEDURE — 71250 CT THORAX DX C-: CPT

## 2024-05-14 ENCOUNTER — TELEPHONE (OUTPATIENT)
Dept: RADIATION ONCOLOGY | Facility: HOSPITAL | Age: 79
End: 2024-05-14
Payer: MEDICARE

## 2024-05-15 ENCOUNTER — OFFICE VISIT (OUTPATIENT)
Dept: RADIATION ONCOLOGY | Facility: HOSPITAL | Age: 79
End: 2024-05-15
Payer: MEDICARE

## 2024-05-15 VITALS
OXYGEN SATURATION: 99 % | WEIGHT: 163.8 LBS | BODY MASS INDEX: 26.44 KG/M2 | SYSTOLIC BLOOD PRESSURE: 180 MMHG | HEART RATE: 52 BPM | DIASTOLIC BLOOD PRESSURE: 87 MMHG

## 2024-05-15 DIAGNOSIS — C34.11 PRIMARY CANCER OF RIGHT UPPER LOBE OF LUNG: Primary | ICD-10-CM

## 2024-05-15 PROCEDURE — G0463 HOSPITAL OUTPT CLINIC VISIT: HCPCS

## 2024-05-15 NOTE — PROGRESS NOTES
[facility] Radiation Oncology   Follow Up    Chief Complaint  Right upper lobe lung lesion with biopsy consistent with neoplastic cells        Diagnosis: Right upper lobe lung lesion with biopsy consistent with neoplastic cells     Overall Stage IB - if Primary Lung Cancer     cT2a: 3.7cm on CT Chest  cN0: per PET CT  cM0: per PET CT and MRI Brain        Radiation Completion Date: 7/24/2023        Prescription:      Site: Right Upper Lobe  Laterality: Right  Total Dose: 4800cGy  Dose per Fraction: 1200cGy  Total Fractions: 4  Daily or BID: Daily  Modality: Photon  Technique: SBRT (2-5fx)  Bolus: No       Interval History:    Kamilla Hawthorne presents for regularly scheduled follow-up approximately 10 months after completion of radiation therapy for right upper lobe lung cancer.  The patient tolerated treatment well and has done fairly well in the interim.  Her main complaint remains knee difficulties due to meniscus tear.  She has no new or concerning pulmonary complaints.      Imaging:      CT Chest 5/9/2024    CONCLUSION: The area of radiation pneumonitis appears smaller. Cluster  of nodules along the pleural surface just inferior to this location  again seen, 3 of which appears slightly larger compared to 02/01/2024,  these are worrisome for potential metastatic deposits. Advise  conservative CT surveillance.      Pathology:      No new relevant pathology      Labs:    Lab Results   Component Value Date    CREATININE 0.70 03/24/2024             Problem List:  Patient Active Problem List   Diagnosis    Essential hypertension    History of melanoma    History of adenomatous polyp of colon    Family history of colon cancer    Mixed hyperlipidemia    Syncope, unspecified syncope type    Lung mass    Paroxysmal atrial fibrillation    Cognitive dysfunction    Syncope          Medications:  Current Outpatient Medications on File Prior to Visit   Medication Sig Dispense Refill    acetaminophen (TYLENOL) 500 MG tablet Take 1  "tablet by mouth Every 6 (Six) Hours As Needed. Indications: Pain      atorvastatin (LIPITOR) 10 MG tablet TAKE 1 TABLET BY MOUTH EVERY DAY 90 tablet 1    cholecalciferol (VITAMIN D3) 1000 units tablet Take 1 tablet by mouth Daily. Indications: Vitamin D Deficiency      dorzolamide-timolol (COSOPT) 22.3-6.8 MG/ML ophthalmic solution INSTILL 1 DROP BY OPHTHALMIC ROUTE 2 TIMES EVERY DAY INTO BOTH EYES  6    Eliquis 5 MG tablet tablet TAKE 1 TABLET BY MOUTH 2 (TWO) TIMES A DAY. INDICATIONS: ATRIAL FIBRILLATION 180 tablet 1    latanoprost (XALATAN) 0.005 % ophthalmic solution INSTILL 1 DROP BY OPHTHALMIC ROUTE EVERY DAY BOTH EYES AT BEDTIME  6    metoprolol tartrate (LOPRESSOR) 25 MG tablet TAKE 0.5 TABLETS BY MOUTH EVERY 12 (TWELVE) HOURS. INDICATIONS: HIGH BLOOD PRESSURE DISORDER 90 tablet 1    valsartan (DIOVAN) 80 MG tablet TAKE 1 TABLET BY MOUTH DAILY FOR HIGH BLOOD PRESSURE 90 tablet 1    vitamin C (ASCORBIC ACID) 500 MG tablet Take 1 tablet by mouth Daily. Indications: Inadequate Vitamin C       No current facility-administered medications on file prior to visit.          Allergies:  Allergies   Allergen Reactions    Penicillins Itching           Vital Signs:  /87   Pulse 52   Wt 74.3 kg (163 lb 12.8 oz)   SpO2 99%   BMI 26.44 kg/m²   Estimated body mass index is 26.44 kg/m² as calculated from the following:    Height as of 4/29/24: 167.6 cm (66\").    Weight as of this encounter: 74.3 kg (163 lb 12.8 oz).  Pain Score    05/15/24 1343   PainSc: 0-No pain         ECOG: Restricted in physically strenuous activity but ambulatory and able to carry out work of a light or sedentary nature, e.g., light house work, office work = 1    Physical Exam  Vitals reviewed.   Constitutional:       General: She is not in acute distress.     Appearance: Normal appearance.   HENT:      Head: Normocephalic and atraumatic.   Eyes:      Extraocular Movements: Extraocular movements intact.      Pupils: Pupils are equal, round, " and reactive to light.   Pulmonary:      Effort: Pulmonary effort is normal.   Abdominal:      General: Abdomen is flat.      Palpations: Abdomen is soft.   Musculoskeletal:      Cervical back: Normal range of motion.   Skin:     General: Skin is warm and dry.   Neurological:      General: No focal deficit present.      Mental Status: She is alert and oriented to person, place, and time.   Psychiatric:         Mood and Affect: Mood normal.         Behavior: Behavior normal.          Result Review :  The following data was reviewed by: Benjie Valenzuela MD on 05/15/2024:  Labs: Last Creatinine   Data reviewed : Radiologic studies CT Chest             Diagnoses and all orders for this visit:    1. Primary cancer of right upper lobe of lung (Primary)        Assessment:    Kamilla Hawthorne presents for regularly scheduled follow-up approximately 10 months after completion of radiation therapy for right upper lobe lung cancer.  The patient tolerated treatment well and has done fairly well in the interim.  Her main complaint remains knee difficulties due to meniscus tear.  She has no new or concerning pulmonary complaints.    I met with the patient and reviewed the results of her most recent CT chest in detail.  Overall, this is consistent with improvement of her previous postradiation inflammation surrounding her treated right upper lobe lesion.  Inferior and medially to this she has had some previous nodularity first noted in her February 2024 CT scan.  This has had some small increase in size.  Unable to determine on CT imaging if postradiation in nature, cannot rule out malignant involvement.  Conservative surveillance is recommended by radiology.  I discussed with the patient that we could perform repeat CT chest or order PET/CT now.  I answered her questions with regards to  discussed with the patient that PET/CT may not determining malignancy versus inflammatory changes.  Subsequent evaluation could involve biopsy.   Patient is uncertain whether she wishes to proceed with CT chest or PET/CT.  She is going to take a few days to think about this and reach out to our department.      Plan:    -CT chest in 3 months versus PET/CT now due to increasing nodularity inferior and medial to her prior treatment field.  Patient is going to think about her options and reach out with her decision in the next several days.  I think either approach is reasonable in this situation.       I spent 30 minutes caring for Kamilla on this date of service. This time includes time spent by me in the following activities:preparing for the visit, reviewing tests, obtaining and/or reviewing a separately obtained history, documenting information in the medical record, independently interpreting results and communicating that information with the patient/family/caregiver, and care coordination  Follow Up   No follow-ups on file.  Patient was given instructions and counseling regarding her condition or for health maintenance advice. Please see specific information pulled into the AVS if appropriate.     Benjie Valenzuela MD

## 2024-05-16 DIAGNOSIS — C34.11 PRIMARY CANCER OF RIGHT UPPER LOBE OF LUNG: Primary | ICD-10-CM

## 2024-07-08 RX ORDER — VALSARTAN 80 MG/1
80 TABLET ORAL DAILY
Qty: 90 TABLET | Refills: 1 | Status: SHIPPED | OUTPATIENT
Start: 2024-07-08

## 2024-08-05 RX ORDER — APIXABAN 5 MG/1
TABLET, FILM COATED ORAL
Qty: 180 TABLET | Refills: 1 | Status: SHIPPED | OUTPATIENT
Start: 2024-08-05

## 2024-08-16 ENCOUNTER — HOSPITAL ENCOUNTER (OUTPATIENT)
Facility: HOSPITAL | Age: 79
Discharge: HOME | End: 2024-08-16
Payer: MEDICARE

## 2024-08-16 DIAGNOSIS — C34.11 PRIMARY CANCER OF RIGHT UPPER LOBE OF LUNG: ICD-10-CM

## 2024-08-16 PROCEDURE — 71250 CT THORAX DX C-: CPT

## 2024-08-21 ENCOUNTER — TELEPHONE (OUTPATIENT)
Dept: RADIATION ONCOLOGY | Facility: HOSPITAL | Age: 79
End: 2024-08-21
Payer: MEDICARE

## 2024-08-22 ENCOUNTER — OFFICE VISIT (OUTPATIENT)
Dept: RADIATION ONCOLOGY | Facility: HOSPITAL | Age: 79
End: 2024-08-22
Payer: MEDICARE

## 2024-08-22 DIAGNOSIS — C34.11 PRIMARY CANCER OF RIGHT UPPER LOBE OF LUNG: Primary | ICD-10-CM

## 2024-08-22 PROCEDURE — G0463 HOSPITAL OUTPT CLINIC VISIT: HCPCS

## 2024-08-22 NOTE — PROGRESS NOTES
[facility] Radiation Oncology   Follow Up    Chief Complaint  Right upper lobe lung lesion with biopsy consistent with neoplastic cells        Diagnosis: Right upper lobe lung lesion with biopsy consistent with neoplastic cells     Overall Stage IB - if Primary Lung Cancer     cT2a: 3.7cm on CT Chest  cN0: per PET CT  cM0: per PET CT and MRI Brain        Radiation Completion Date: 7/24/2023        Prescription:      Site: Right Upper Lobe  Laterality: Right  Total Dose: 4800cGy  Dose per Fraction: 1200cGy  Total Fractions: 4  Daily or BID: Daily  Modality: Photon  Technique: SBRT (2-5fx)  Bolus: No      Interval History:    Kamilla Hawthorne presents for regularly scheduled follow-up approximately 1 year after completion of radiation therapy to a right upper lobe lung lesion.  The patient tolerated treatment well and did fairly well in the interim.  She is struggling primarily orthopedic issues, but is also having a fair amount of memory issues.  We have been following a cluster of nodules just inferior to her treated lesion along the pleural surface near the vertebra in the right lower lobe.      Imaging:      CT Chest       Pathology:      No new relevant pathology      Labs:    Lab Results   Component Value Date    CREATININE 0.70 03/24/2024             Problem List:  Patient Active Problem List   Diagnosis    Essential hypertension    History of melanoma    History of adenomatous polyp of colon    Family history of colon cancer    Mixed hyperlipidemia    Syncope, unspecified syncope type    Lung mass    Paroxysmal atrial fibrillation    Cognitive dysfunction    Syncope          Medications:  Current Outpatient Medications on File Prior to Visit   Medication Sig Dispense Refill    acetaminophen (TYLENOL) 500 MG tablet Take 1 tablet by mouth Every 6 (Six) Hours As Needed. Indications: Pain      atorvastatin (LIPITOR) 10 MG tablet TAKE 1 TABLET BY MOUTH EVERY DAY 90 tablet 1    cholecalciferol (VITAMIN D3) 1000 units  "tablet Take 1 tablet by mouth Daily. Indications: Vitamin D Deficiency      dorzolamide-timolol (COSOPT) 22.3-6.8 MG/ML ophthalmic solution INSTILL 1 DROP BY OPHTHALMIC ROUTE 2 TIMES EVERY DAY INTO BOTH EYES  6    Eliquis 5 MG tablet tablet TAKE 1 TABLET BY MOUTH 2 (TWO) TIMES A DAY. INDICATIONS: ATRIAL FIBRILLATION 180 tablet 1    latanoprost (XALATAN) 0.005 % ophthalmic solution INSTILL 1 DROP BY OPHTHALMIC ROUTE EVERY DAY BOTH EYES AT BEDTIME  6    metoprolol tartrate (LOPRESSOR) 25 MG tablet TAKE 0.5 TABLETS BY MOUTH EVERY 12 (TWELVE) HOURS. INDICATIONS: HIGH BLOOD PRESSURE DISORDER 90 tablet 1    valsartan (DIOVAN) 80 MG tablet TAKE 1 TABLET BY MOUTH EVERY DAY FOR HIGH BLOOD PRESSURE 90 tablet 1    vitamin C (ASCORBIC ACID) 500 MG tablet Take 1 tablet by mouth Daily. Indications: Inadequate Vitamin C       No current facility-administered medications on file prior to visit.          Allergies:  Allergies   Allergen Reactions    Penicillins Itching         Vital Signs:  There were no vitals taken for this visit.  Estimated body mass index is 26.44 kg/m² as calculated from the following:    Height as of 4/29/24: 167.6 cm (66\").    Weight as of 5/15/24: 74.3 kg (163 lb 12.8 oz).  There were no vitals filed for this visit.      ECOG: Ambulatory and capable of all selfcare but unable to carry out any work activities; up and about more than 50% of waking hours = 2    Physical Exam  Vitals reviewed.   Constitutional:       General: She is not in acute distress.     Appearance: Normal appearance.   HENT:      Head: Normocephalic and atraumatic.   Eyes:      Extraocular Movements: Extraocular movements intact.      Pupils: Pupils are equal, round, and reactive to light.   Pulmonary:      Effort: Pulmonary effort is normal.   Abdominal:      General: Abdomen is flat.      Palpations: Abdomen is soft.   Musculoskeletal:      Cervical back: Normal range of motion.   Skin:     General: Skin is warm and dry.   Neurological: "      Mental Status: She is alert.   Psychiatric:         Mood and Affect: Mood normal.         Behavior: Behavior normal.          Result Review :  The following data was reviewed by: Benjie Valenzuela MD on 08/22/2024:  Labs: Last Creatinine   Data reviewed : Radiologic studies CT Chest             Diagnoses and all orders for this visit:    1. Primary cancer of right upper lobe of lung (Primary)        Assessment:    Kamilla Hawthorne presents for regularly scheduled follow-up approximately 1 year after completion of radiation therapy to a right upper lobe lung lesion.  The patient tolerated treatment well and did fairly well in the interim.  She is struggling primarily orthopedic issues, but is also having a fair amount of memory issues.  We have been following a cluster of nodules just inferior to her treated lesion along the pleural surface near the vertebra in the right lower lobe.    I met with the patient and reviewed the results of her most recent CT chest in detail.  This is again showed concern for possible growth in this cluster of nodules inferior to her treatment field and along the pleural surface abutting the vertebral column.  I again recommended PET/CT after thorough discussion regarding her prior treatment field and her postradiation imaging.  The patient was amenable to this.  I discussed that this may lead towards biopsy in multidisciplinary tumor board discussion.  If need be, I do think that additional radiation therapy could be used should she have evidence of malignancy in these nodules.      Plan:    -Plan on PET/CT for evaluation of growing lung nodules inferior to her prior treatment field       I spent 30 minutes caring for Kamilla on this date of service. This time includes time spent by me in the following activities:preparing for the visit, reviewing tests, obtaining and/or reviewing a separately obtained history, ordering medications, tests, or procedures, documenting information in the  medical record, independently interpreting results and communicating that information with the patient/family/caregiver, and care coordination  Follow Up   No follow-ups on file.  Patient was given instructions and counseling regarding her condition or for health maintenance advice. Please see specific information pulled into the AVS if appropriate.     Benjie Valenzuela MD

## 2024-09-04 ENCOUNTER — OFFICE VISIT (OUTPATIENT)
Dept: CARDIOLOGY | Facility: CLINIC | Age: 79
End: 2024-09-04
Payer: MEDICARE

## 2024-09-04 VITALS
SYSTOLIC BLOOD PRESSURE: 124 MMHG | BODY MASS INDEX: 26.61 KG/M2 | HEIGHT: 66 IN | OXYGEN SATURATION: 97 % | DIASTOLIC BLOOD PRESSURE: 70 MMHG | WEIGHT: 165.6 LBS | HEART RATE: 54 BPM

## 2024-09-04 DIAGNOSIS — I48.0 PAROXYSMAL ATRIAL FIBRILLATION: Primary | ICD-10-CM

## 2024-09-04 DIAGNOSIS — E78.2 MIXED HYPERLIPIDEMIA: ICD-10-CM

## 2024-09-04 DIAGNOSIS — I10 PRIMARY HYPERTENSION: ICD-10-CM

## 2024-09-04 PROCEDURE — 3078F DIAST BP <80 MM HG: CPT | Performed by: INTERNAL MEDICINE

## 2024-09-04 PROCEDURE — 3074F SYST BP LT 130 MM HG: CPT | Performed by: INTERNAL MEDICINE

## 2024-09-04 PROCEDURE — 1160F RVW MEDS BY RX/DR IN RCRD: CPT | Performed by: INTERNAL MEDICINE

## 2024-09-04 PROCEDURE — 99214 OFFICE O/P EST MOD 30 MIN: CPT | Performed by: INTERNAL MEDICINE

## 2024-09-04 PROCEDURE — 1159F MED LIST DOCD IN RCRD: CPT | Performed by: INTERNAL MEDICINE

## 2024-09-04 RX ORDER — AMLODIPINE BESYLATE 2.5 MG/1
2.5 TABLET ORAL DAILY
COMMUNITY
Start: 2024-06-29

## 2024-09-04 NOTE — PROGRESS NOTES
[facility]      Patient Name: Kamilla Hawthorne  :1945  Age: 78 y.o.  Encounter Provider:  Estuardo Lazaro Jr, MD      Chief Complaint: Hospital follow-up atrial fibrillation      HPI  Kamilla Hawthorne is a 78 y.o. female who I saw in the hospital for syncope and had a fairly normal work-up until day 3 where she was found to be in atrial fibrillation with RVR.  Spontaneous cardioversion.     Last clinic visit note: Patient was placed on anticoagulation and has been doing well since hospital stay.  Unfortunately on that hospital stay she was diagnosed with breast cancer and shows to have radiation only.  She notes mild dyspnea on exertion since being started on radiation therapy.  No adjuvant chemotherapy at patient request.  She has not experienced palpitations, dizziness or syncope since leaving the hospital.  No angina.  No orthopnea, PND or edema.  Social and family history was reviewed and is not pertinent to this clinic visit.    She was unfortunately diagnosed with non-small cell lung cancer.  She had a repeat episode of syncope for which she was evaluated with comprehensive cardiac panel.  No significant arrhythmias or structural heart disease.  She has experienced no further episodes.  With activities of daily living she denies chest pain or shortness of air.  Functional passively limited by chronic knee pain.  No orthopnea, PND or edema.  Cardiac complaints time of interview.    The following portions of the patient's history were reviewed and updated as appropriate: allergies, current medications, past family history, past medical history, past social history, past surgical history and problem list.      Review of Systems   Constitutional: Negative for chills and fever.   HENT:  Negative for hoarse voice and sore throat.    Eyes:  Negative for double vision and photophobia.   Cardiovascular:  Positive for dyspnea on exertion. Negative for chest pain, leg swelling, near-syncope,  "orthopnea, palpitations, paroxysmal nocturnal dyspnea and syncope.   Respiratory:  Negative for cough and wheezing.    Skin:  Negative for poor wound healing and rash.   Musculoskeletal:  Negative for arthritis and joint swelling.   Gastrointestinal:  Negative for bloating, abdominal pain, hematemesis and hematochezia.   Neurological:  Negative for dizziness and focal weakness.   Psychiatric/Behavioral:  Negative for depression and suicidal ideas.      OBJECTIVE:   Vital Signs  Vitals:    09/04/24 1101   BP: 124/70   Pulse: 54   SpO2: 97%     Estimated body mass index is 27.14 kg/m² as calculated from the following:    Height as of this encounter: 166.4 cm (65.5\").    Weight as of this encounter: 75.1 kg (165 lb 9.6 oz).    Vitals reviewed.   Constitutional:       Appearance: Healthy appearance. Not in distress.   Neck:      Vascular: No JVR. JVD normal.   Pulmonary:      Effort: Pulmonary effort is normal.      Breath sounds: Normal breath sounds. No wheezing. No rhonchi. No rales.   Chest:      Chest wall: Not tender to palpatation.   Cardiovascular:      PMI at left midclavicular line. Normal rate. Regular rhythm. Normal S1. Normal S2.       Murmurs: There is no murmur.      No gallop.  No click. No rub.   Pulses:     Intact distal pulses.   Edema:     Peripheral edema absent.   Abdominal:      General: Bowel sounds are normal.      Palpations: Abdomen is soft.      Tenderness: There is no abdominal tenderness.   Musculoskeletal: Normal range of motion.         General: No tenderness. Skin:     General: Skin is warm and dry.   Neurological:      General: No focal deficit present.      Mental Status: Alert and oriented to person, place and time.       Procedures    Lipid Panel          4/29/2024    14:25   Lipid Panel   Total Cholesterol 185    Triglycerides 65    HDL Cholesterol 93    VLDL Cholesterol 12    LDL Cholesterol  80         BUN   Date Value Ref Range Status   03/24/2024 19 8 - 23 mg/dL Final "     Creatinine   Date Value Ref Range Status   03/24/2024 0.70 0.57 - 1.00 mg/dL Final     Potassium   Date Value Ref Range Status   03/24/2024 3.8 3.5 - 5.2 mmol/L Final     ALT (SGPT)   Date Value Ref Range Status   03/23/2024 20 1 - 33 U/L Final     AST (SGOT)   Date Value Ref Range Status   03/23/2024 15 1 - 32 U/L Final           ASSESSMENT:     77-year-old female with recently diagnosed breast cancer and hospitalization for syncope at which time she was also diagnosed with atrial fibrillation presents for hospital follow-up      PLAN OF CARE:     Syncope -comprehensive cardiac workup with no recent recurrence.  We will continue to monitor closely.  PAF -no recent symptoms that would be concerning for recurrence of A-fib.  Continue metoprolol.  No bleeding complications on apixaban we will continue the same.  Breast cancer -radiation therapy following with radiation oncology and hematology oncology.  Hypertension -seemingly well-controlled at this time.    Return to clinic 12 months             Discharge Medications            Accurate as of September 4, 2024 11:04 AM. If you have any questions, ask your nurse or doctor.                Continue These Medications        Instructions Start Date   acetaminophen 500 MG tablet  Commonly known as: TYLENOL   500 mg, Oral, Every 6 Hours PRN      amLODIPine 2.5 MG tablet  Commonly known as: NORVASC   2.5 mg, Oral, Daily      atorvastatin 10 MG tablet  Commonly known as: LIPITOR   TAKE 1 TABLET BY MOUTH EVERY DAY      cholecalciferol 25 MCG (1000 UT) tablet  Commonly known as: VITAMIN D3   1 tablet, Oral, Daily      dorzolamide-timolol 2-0.5 % ophthalmic solution  Commonly known as: COSOPT   INSTILL 1 DROP BY OPHTHALMIC ROUTE 2 TIMES EVERY DAY INTO BOTH EYES      Eliquis 5 MG tablet tablet  Generic drug: apixaban   TAKE 1 TABLET BY MOUTH 2 (TWO) TIMES A DAY. INDICATIONS: ATRIAL FIBRILLATION      latanoprost 0.005 % ophthalmic solution  Commonly known as: XALATAN    INSTILL 1 DROP BY OPHTHALMIC ROUTE EVERY DAY BOTH EYES AT BEDTIME      metoprolol tartrate 25 MG tablet  Commonly known as: LOPRESSOR   12.5 mg, Oral, Every 12 Hours Scheduled      valsartan 80 MG tablet  Commonly known as: DIOVAN   80 mg, Oral, Daily, for high blood pressure      vitamin C 500 MG tablet  Commonly known as: ASCORBIC ACID   1 tablet, Oral, Daily               Thank you for allowing me to participate in the care of your patient,      Sincerely,   Estuardo Lazaro MD  [facility]  09/04/24  11:04 EDT

## 2024-09-09 ENCOUNTER — HOSPITAL ENCOUNTER (OUTPATIENT)
Dept: PET IMAGING | Facility: HOSPITAL | Age: 79
Discharge: HOME | End: 2024-09-09
Payer: MEDICARE

## 2024-09-09 DIAGNOSIS — C34.11 PRIMARY CANCER OF RIGHT UPPER LOBE OF LUNG: ICD-10-CM

## 2024-09-09 LAB — GLUCOSE BLDC GLUCOMTR-MCNC: 91 MG/DL (ref 70–130)

## 2024-09-09 PROCEDURE — 82948 REAGENT STRIP/BLOOD GLUCOSE: CPT

## 2024-09-09 PROCEDURE — A9552 F18 FDG: HCPCS | Performed by: STUDENT IN AN ORGANIZED HEALTH CARE EDUCATION/TRAINING PROGRAM

## 2024-09-09 PROCEDURE — 0 FLUDEOXYGLUCOSE F18 SOLUTION: Performed by: STUDENT IN AN ORGANIZED HEALTH CARE EDUCATION/TRAINING PROGRAM

## 2024-09-09 PROCEDURE — 78815 PET IMAGE W/CT SKULL-THIGH: CPT

## 2024-09-09 RX ADMIN — FLUDEOXYGLUCOSE F 18 1 DOSE: 200 INJECTION, SOLUTION INTRAVENOUS at 10:15

## 2024-09-12 DIAGNOSIS — E78.2 MIXED HYPERLIPIDEMIA: Chronic | ICD-10-CM

## 2024-09-12 RX ORDER — ATORVASTATIN CALCIUM 10 MG/1
TABLET, FILM COATED ORAL
Qty: 90 TABLET | Refills: 1 | Status: SHIPPED | OUTPATIENT
Start: 2024-09-12

## 2024-09-13 ENCOUNTER — TELEPHONE (OUTPATIENT)
Dept: RADIATION ONCOLOGY | Facility: CLINIC | Age: 79
End: 2024-09-13
Payer: MEDICARE

## 2024-09-16 DIAGNOSIS — C34.11 PRIMARY CANCER OF RIGHT UPPER LOBE OF LUNG: Primary | ICD-10-CM

## 2024-09-19 ENCOUNTER — PATIENT OUTREACH (OUTPATIENT)
Dept: OTHER | Facility: HOSPITAL | Age: 79
End: 2024-09-19
Payer: MEDICARE

## 2024-09-19 DIAGNOSIS — R91.8 LUNG MASS: Primary | Chronic | ICD-10-CM

## 2024-09-19 DIAGNOSIS — C34.11 PRIMARY CANCER OF RIGHT UPPER LOBE OF LUNG: Primary | ICD-10-CM

## 2024-09-20 ENCOUNTER — PATIENT OUTREACH (OUTPATIENT)
Dept: OTHER | Facility: HOSPITAL | Age: 79
End: 2024-09-20
Payer: MEDICARE

## 2024-09-26 ENCOUNTER — HOSPITAL ENCOUNTER (OUTPATIENT)
Dept: RADIATION ONCOLOGY | Facility: HOSPITAL | Age: 79
Setting detail: RADIATION/ONCOLOGY SERIES
End: 2024-09-26
Payer: MEDICARE

## 2024-09-26 ENCOUNTER — HOSPITAL ENCOUNTER (OUTPATIENT)
Dept: RADIATION ONCOLOGY | Facility: HOSPITAL | Age: 79
Discharge: HOME | End: 2024-09-26

## 2024-09-26 PROCEDURE — 77334 RADIATION TREATMENT AID(S): CPT | Performed by: STUDENT IN AN ORGANIZED HEALTH CARE EDUCATION/TRAINING PROGRAM

## 2024-09-26 PROCEDURE — 77470 SPECIAL RADIATION TREATMENT: CPT | Performed by: STUDENT IN AN ORGANIZED HEALTH CARE EDUCATION/TRAINING PROGRAM

## 2024-09-27 RX ORDER — METOPROLOL TARTRATE 25 MG/1
12.5 TABLET, FILM COATED ORAL EVERY 12 HOURS SCHEDULED
Qty: 90 TABLET | Refills: 1 | Status: SHIPPED | OUTPATIENT
Start: 2024-09-27

## 2024-09-30 ENCOUNTER — TELEPHONE (OUTPATIENT)
Dept: OTHER | Facility: HOSPITAL | Age: 79
End: 2024-09-30
Payer: MEDICARE

## 2024-09-30 NOTE — TELEPHONE ENCOUNTER
[facility] MULTIDISCIPLINARY CLINIC  SURVIVORSHIP SERVICES CARE COORDINATION NOTE  PHONE      Call placed to patient's daughter(Xi Valderrama)  RE: open referral for older adult functional assessment visit.    Spoke with patients daughter and LVM on 9-30-24 to check on status of scheduling an OAFA per referral from  Will await return call     Introduced myself and reviewed purpose and goals of functional assessment and what to expect..     older adult functional assessment information and self-report tools.

## 2024-10-01 ENCOUNTER — HOSPITAL ENCOUNTER (OUTPATIENT)
Dept: RADIATION ONCOLOGY | Facility: HOSPITAL | Age: 79
Setting detail: RADIATION/ONCOLOGY SERIES
End: 2024-10-01
Payer: MEDICARE

## 2024-10-02 ENCOUNTER — TELEPHONE (OUTPATIENT)
Dept: RADIATION ONCOLOGY | Facility: CLINIC | Age: 79
End: 2024-10-02
Payer: MEDICARE

## 2024-10-02 NOTE — TELEPHONE ENCOUNTER
Returned PT's call regarding questions about her radiation treatment date. Relayed information from our clinical coordinator, Shameka, that her plan is still in process with no further updates at this time. PT verbally understood.

## 2024-10-03 ENCOUNTER — PATIENT OUTREACH (OUTPATIENT)
Dept: OTHER | Facility: HOSPITAL | Age: 79
End: 2024-10-03
Payer: MEDICARE

## 2024-10-03 NOTE — PROGRESS NOTES
Reviewed chart. CT sim 9/26    Called patient. Left message that I was just touching base to see how she was doing. Wanted to know if she had any questions/concerns or resource/supportive care needs; requested cb    Call from patient. She states she is doing well. She appreciated the information about TAT between CT simulation and starting treatment. She has no questions//or concerns..

## 2024-10-07 ENCOUNTER — HOSPITAL ENCOUNTER (OUTPATIENT)
Dept: RADIATION ONCOLOGY | Facility: HOSPITAL | Age: 79
Discharge: HOME | End: 2024-10-07
Payer: MEDICARE

## 2024-10-07 PROCEDURE — 77338 DESIGN MLC DEVICE FOR IMRT: CPT | Performed by: STUDENT IN AN ORGANIZED HEALTH CARE EDUCATION/TRAINING PROGRAM

## 2024-10-07 PROCEDURE — 77301 RADIOTHERAPY DOSE PLAN IMRT: CPT | Performed by: STUDENT IN AN ORGANIZED HEALTH CARE EDUCATION/TRAINING PROGRAM

## 2024-10-07 PROCEDURE — 77293 RESPIRATOR MOTION MGMT SIMUL: CPT | Performed by: STUDENT IN AN ORGANIZED HEALTH CARE EDUCATION/TRAINING PROGRAM

## 2024-10-07 PROCEDURE — 77300 RADIATION THERAPY DOSE PLAN: CPT | Performed by: STUDENT IN AN ORGANIZED HEALTH CARE EDUCATION/TRAINING PROGRAM

## 2024-10-09 ENCOUNTER — PATIENT OUTREACH (OUTPATIENT)
Dept: OTHER | Facility: HOSPITAL | Age: 79
End: 2024-10-09
Payer: MEDICARE

## 2024-10-09 NOTE — PROGRESS NOTES
Call from patient. She states she hasn't heard back about when she is starting radiation. Transferred her to radiation department to discuss.

## 2024-10-10 ENCOUNTER — PATIENT OUTREACH (OUTPATIENT)
Dept: OTHER | Facility: HOSPITAL | Age: 79
End: 2024-10-10
Payer: MEDICARE

## 2024-10-10 ENCOUNTER — TELEPHONE (OUTPATIENT)
Dept: RADIATION ONCOLOGY | Facility: HOSPITAL | Age: 79
End: 2024-10-10
Payer: MEDICARE

## 2024-10-10 NOTE — TELEPHONE ENCOUNTER
Called patient and gave her appointment date and times for her radiation treatments per Shameka. Patient verbalized understanding.

## 2024-10-10 NOTE — TELEPHONE ENCOUNTER
Patient called to speak to Shameka regarding scheduling her radiation appointments. Will pass this on to Shameka.

## 2024-10-14 ENCOUNTER — HOSPITAL ENCOUNTER (OUTPATIENT)
Dept: RADIATION ONCOLOGY | Facility: HOSPITAL | Age: 79
Discharge: HOME | End: 2024-10-14
Payer: MEDICARE

## 2024-10-14 LAB
RAD ONC ARIA COURSE ID: NORMAL
RAD ONC ARIA COURSE INTENT: NORMAL
RAD ONC ARIA COURSE LAST TREATMENT DATE: NORMAL
RAD ONC ARIA COURSE START DATE: NORMAL
RAD ONC ARIA COURSE TREATMENT ELAPSED DAYS: 0
RAD ONC ARIA FIRST TREATMENT DATE: NORMAL
RAD ONC ARIA PLAN FRACTIONS TREATED TO DATE: 1
RAD ONC ARIA PLAN ID: NORMAL
RAD ONC ARIA PLAN PRESCRIBED DOSE PER FRACTION: 10 GY
RAD ONC ARIA PLAN PRIMARY REFERENCE POINT: NORMAL
RAD ONC ARIA PLAN TOTAL FRACTIONS PRESCRIBED: 5
RAD ONC ARIA PLAN TOTAL PRESCRIBED DOSE: 5000 CGY
RAD ONC ARIA REFERENCE POINT DOSAGE GIVEN TO DATE: 10 GY
RAD ONC ARIA REFERENCE POINT ID: NORMAL
RAD ONC ARIA REFERENCE POINT SESSION DOSAGE GIVEN: 10 GY

## 2024-10-14 PROCEDURE — 77435 SBRT MANAGEMENT: CPT | Performed by: STUDENT IN AN ORGANIZED HEALTH CARE EDUCATION/TRAINING PROGRAM

## 2024-10-14 PROCEDURE — 77373 STRTCTC BDY RAD THER TX DLVR: CPT | Performed by: STUDENT IN AN ORGANIZED HEALTH CARE EDUCATION/TRAINING PROGRAM

## 2024-10-16 ENCOUNTER — HOSPITAL ENCOUNTER (OUTPATIENT)
Dept: RADIATION ONCOLOGY | Facility: HOSPITAL | Age: 79
Discharge: HOME | End: 2024-10-16

## 2024-10-16 LAB

## 2024-10-16 PROCEDURE — 77373 STRTCTC BDY RAD THER TX DLVR: CPT | Performed by: STUDENT IN AN ORGANIZED HEALTH CARE EDUCATION/TRAINING PROGRAM

## 2024-10-18 ENCOUNTER — HOSPITAL ENCOUNTER (OUTPATIENT)
Dept: RADIATION ONCOLOGY | Facility: HOSPITAL | Age: 79
Discharge: HOME | End: 2024-10-18

## 2024-10-18 LAB
RAD ONC ARIA COURSE END DATE: NORMAL
RAD ONC ARIA COURSE ID: NORMAL
RAD ONC ARIA COURSE ID: NORMAL
RAD ONC ARIA COURSE INTENT: NORMAL
RAD ONC ARIA COURSE INTENT: NORMAL
RAD ONC ARIA COURSE LAST TREATMENT DATE: NORMAL
RAD ONC ARIA COURSE LAST TREATMENT DATE: NORMAL
RAD ONC ARIA COURSE START DATE: NORMAL
RAD ONC ARIA COURSE START DATE: NORMAL
RAD ONC ARIA COURSE TREATMENT ELAPSED DAYS: 4
RAD ONC ARIA COURSE TREATMENT ELAPSED DAYS: 5
RAD ONC ARIA FIRST TREATMENT DATE: NORMAL
RAD ONC ARIA FIRST TREATMENT DATE: NORMAL
RAD ONC ARIA PLAN FRACTIONS TREATED TO DATE: 3
RAD ONC ARIA PLAN FRACTIONS TREATED TO DATE: 4
RAD ONC ARIA PLAN ID: NORMAL
RAD ONC ARIA PLAN ID: NORMAL
RAD ONC ARIA PLAN NAME: NORMAL
RAD ONC ARIA PLAN PRESCRIBED DOSE PER FRACTION: 10 GY
RAD ONC ARIA PLAN PRESCRIBED DOSE PER FRACTION: 12 GY
RAD ONC ARIA PLAN PRIMARY REFERENCE POINT: NORMAL
RAD ONC ARIA PLAN PRIMARY REFERENCE POINT: NORMAL
RAD ONC ARIA PLAN TOTAL FRACTIONS PRESCRIBED: 4
RAD ONC ARIA PLAN TOTAL FRACTIONS PRESCRIBED: 5
RAD ONC ARIA PLAN TOTAL PRESCRIBED DOSE: 4800 CGY
RAD ONC ARIA PLAN TOTAL PRESCRIBED DOSE: 5000 CGY
RAD ONC ARIA REFERENCE POINT DOSAGE GIVEN TO DATE: 30 GY
RAD ONC ARIA REFERENCE POINT DOSAGE GIVEN TO DATE: 48 GY
RAD ONC ARIA REFERENCE POINT ID: NORMAL
RAD ONC ARIA REFERENCE POINT ID: NORMAL
RAD ONC ARIA REFERENCE POINT SESSION DOSAGE GIVEN: 10 GY

## 2024-10-18 PROCEDURE — 77336 RADIATION PHYSICS CONSULT: CPT | Performed by: STUDENT IN AN ORGANIZED HEALTH CARE EDUCATION/TRAINING PROGRAM

## 2024-10-18 PROCEDURE — 77373 STRTCTC BDY RAD THER TX DLVR: CPT | Performed by: STUDENT IN AN ORGANIZED HEALTH CARE EDUCATION/TRAINING PROGRAM

## 2024-10-21 ENCOUNTER — HOSPITAL ENCOUNTER (OUTPATIENT)
Dept: RADIATION ONCOLOGY | Facility: HOSPITAL | Age: 79
Discharge: HOME | End: 2024-10-21
Payer: MEDICARE

## 2024-10-21 LAB
RAD ONC ARIA COURSE ID: NORMAL
RAD ONC ARIA COURSE INTENT: NORMAL
RAD ONC ARIA COURSE LAST TREATMENT DATE: NORMAL
RAD ONC ARIA COURSE START DATE: NORMAL
RAD ONC ARIA COURSE TREATMENT ELAPSED DAYS: 7
RAD ONC ARIA FIRST TREATMENT DATE: NORMAL
RAD ONC ARIA PLAN FRACTIONS TREATED TO DATE: 4
RAD ONC ARIA PLAN ID: NORMAL
RAD ONC ARIA PLAN PRESCRIBED DOSE PER FRACTION: 10 GY
RAD ONC ARIA PLAN PRIMARY REFERENCE POINT: NORMAL
RAD ONC ARIA PLAN TOTAL FRACTIONS PRESCRIBED: 5
RAD ONC ARIA PLAN TOTAL PRESCRIBED DOSE: 5000 CGY
RAD ONC ARIA REFERENCE POINT DOSAGE GIVEN TO DATE: 40 GY
RAD ONC ARIA REFERENCE POINT ID: NORMAL
RAD ONC ARIA REFERENCE POINT SESSION DOSAGE GIVEN: 10 GY

## 2024-10-21 PROCEDURE — 77373 STRTCTC BDY RAD THER TX DLVR: CPT | Performed by: STUDENT IN AN ORGANIZED HEALTH CARE EDUCATION/TRAINING PROGRAM

## 2024-10-24 ENCOUNTER — TREATMENT (OUTPATIENT)
Dept: RADIATION ONCOLOGY | Facility: HOSPITAL | Age: 79
End: 2024-10-24

## 2024-10-24 ENCOUNTER — RADIATION ONCOLOGY WEEKLY ASSESSMENT (OUTPATIENT)
Dept: RADIATION ONCOLOGY | Facility: HOSPITAL | Age: 79
End: 2024-10-24
Payer: MEDICARE

## 2024-10-24 VITALS
OXYGEN SATURATION: 98 % | HEART RATE: 87 BPM | WEIGHT: 164 LBS | SYSTOLIC BLOOD PRESSURE: 148 MMHG | BODY MASS INDEX: 26.88 KG/M2 | DIASTOLIC BLOOD PRESSURE: 84 MMHG

## 2024-10-24 DIAGNOSIS — C34.11 PRIMARY CANCER OF RIGHT UPPER LOBE OF LUNG: Primary | ICD-10-CM

## 2024-10-24 DIAGNOSIS — R91.8 LUNG MASS: ICD-10-CM

## 2024-10-24 LAB
RAD ONC ARIA COURSE END DATE: NORMAL
RAD ONC ARIA COURSE ID: NORMAL
RAD ONC ARIA COURSE ID: NORMAL
RAD ONC ARIA COURSE INTENT: NORMAL
RAD ONC ARIA COURSE INTENT: NORMAL
RAD ONC ARIA COURSE LAST TREATMENT DATE: NORMAL
RAD ONC ARIA COURSE LAST TREATMENT DATE: NORMAL
RAD ONC ARIA COURSE START DATE: NORMAL
RAD ONC ARIA COURSE START DATE: NORMAL
RAD ONC ARIA COURSE TREATMENT ELAPSED DAYS: 10
RAD ONC ARIA COURSE TREATMENT ELAPSED DAYS: 10
RAD ONC ARIA FIRST TREATMENT DATE: NORMAL
RAD ONC ARIA FIRST TREATMENT DATE: NORMAL
RAD ONC ARIA PLAN FRACTIONS TREATED TO DATE: 5
RAD ONC ARIA PLAN FRACTIONS TREATED TO DATE: 5
RAD ONC ARIA PLAN ID: NORMAL
RAD ONC ARIA PLAN ID: NORMAL
RAD ONC ARIA PLAN NAME: NORMAL
RAD ONC ARIA PLAN PRESCRIBED DOSE PER FRACTION: 10 GY
RAD ONC ARIA PLAN PRESCRIBED DOSE PER FRACTION: 10 GY
RAD ONC ARIA PLAN PRIMARY REFERENCE POINT: NORMAL
RAD ONC ARIA PLAN PRIMARY REFERENCE POINT: NORMAL
RAD ONC ARIA PLAN TOTAL FRACTIONS PRESCRIBED: 5
RAD ONC ARIA PLAN TOTAL FRACTIONS PRESCRIBED: 5
RAD ONC ARIA PLAN TOTAL PRESCRIBED DOSE: 5000 CGY
RAD ONC ARIA PLAN TOTAL PRESCRIBED DOSE: 5000 CGY
RAD ONC ARIA REFERENCE POINT DOSAGE GIVEN TO DATE: 50 GY
RAD ONC ARIA REFERENCE POINT DOSAGE GIVEN TO DATE: 50 GY
RAD ONC ARIA REFERENCE POINT ID: NORMAL
RAD ONC ARIA REFERENCE POINT ID: NORMAL
RAD ONC ARIA REFERENCE POINT SESSION DOSAGE GIVEN: 10 GY

## 2024-10-24 PROCEDURE — 77373 STRTCTC BDY RAD THER TX DLVR: CPT | Performed by: STUDENT IN AN ORGANIZED HEALTH CARE EDUCATION/TRAINING PROGRAM

## 2024-10-24 NOTE — PROGRESS NOTES
Radiation Oncology  On-Treatment Note      Patient: Kamilla Hawthorne    MRN: [id number]    Attending Physician: Benjie Valenzuela MD     Diagnosis:     ICD-10-CM ICD-9-CM   1. Primary cancer of right upper lobe of lung  C34.11 162.3   2. Lung mass  R91.8 786.6       Radiation Therapy Visit:  Continue radiation therapy, Dosimetry plan remains acceptable, Films reviewed and remains acceptable, Pain assessed, Pain management planned, Radiation dose schedule reviewed and remains acceptable, Radiation technique remains acceptable, and Symptoms within expected range    Radiation Treatments       Active   Reference Points   Rx: RUL Retx   Most recent treatment: Dose given: 1,000 cGy (on 10/24/2024)   Total: Dose given: 5,000 cGy   Elapsed Days: 10                      Physical Examination:  Vitals: Blood pressure 148/84, pulse 87, weight 74.4 kg (164 lb), SpO2 98%, not currently breastfeeding.  Pain Score    10/24/24 1614   PainSc: 0-No pain       Ambulatory and capable of all selfcare but unable to carry out any work activities; up and about more than 50% of waking hours = 2    We examined the relevant areas: yes  Findings are within the expected range for this stage of treatment: yes  -------------------------------------------------------------------------------------------------------------------    ACTION ITEMS:  Patient tolerating treatment well and as expected for this stage in their treatment and Continue radiation therapy as planned    Estimated Completion Date: 10/25/2024    Follow up in 3 months with CT Chest      Benjie Valenzuela MD  Radiation Oncology

## 2024-10-25 NOTE — PROGRESS NOTES
Radiation Treatment Summary Note      Patient Name: Kamilla Hawthorne  : 1945    Attending Provider: Benjie Valenzuela MD      Diagnosis:     ICD-10-CM ICD-9-CM   1. Primary cancer of right upper lobe of lung  C34.11 162.3       Radiation Start Date: 10/14/2024    Radiation Completion Date: 10/24/2024      Prescription:     Site: RUL  Laterality: Right  Total Dose: 5000cGy  Dose per Fraction: 1000cGy  Total Fractions: 5  Daily or BID:  QOD  Modality: Photon  Technique: SBRT (2-5fx)  Bolus: No    Final Delivered Dose Deviated From Initially Prescribed Dose: No    Concurrent Chemotherapy: No    Patient Tolerated Treatment Without Unexpected Side Effects/Complications: Yes    ECOG: Ambulatory and capable of all selfcare but unable to carry out any work activities; up and about more than 50% of waking hours = 2    Pain Management Plan: None Indicated/PRN OTC    Follow-Up Plan: 3 months    Imaging Ordered for Follow-Up: Yes, describe: CT Chest        Benjie Valenzuela MD

## 2024-10-29 ENCOUNTER — TELEPHONE (OUTPATIENT)
Dept: OTHER | Facility: HOSPITAL | Age: 79
End: 2024-10-29
Payer: MEDICARE

## 2024-10-29 NOTE — TELEPHONE ENCOUNTER
[facility] MULTIDISCIPLINARY CLINIC  SURVIVORSHIP SERVICES CARE COORDINATION NOTE  PHONE      Call placed to patient  RE: open referral for survivorship care plan and treatment summary visit.    Left voice mail     Introduced myself and reviewed purpose and goals of survivorship visit as well as what to expect..    Patient mailed name and contact information for Survivorship Clinic [phone]

## 2024-11-01 ENCOUNTER — LAB (OUTPATIENT)
Dept: LAB | Facility: HOSPITAL | Age: 79
End: 2024-11-01
Payer: MEDICARE

## 2024-11-01 ENCOUNTER — OFFICE VISIT (OUTPATIENT)
Dept: INTERNAL MEDICINE | Facility: CLINIC | Age: 79
End: 2024-11-01
Payer: MEDICARE

## 2024-11-01 VITALS
RESPIRATION RATE: 16 BRPM | OXYGEN SATURATION: 98 % | WEIGHT: 160 LBS | TEMPERATURE: 97.8 F | BODY MASS INDEX: 25.71 KG/M2 | SYSTOLIC BLOOD PRESSURE: 110 MMHG | HEIGHT: 66 IN | HEART RATE: 59 BPM | DIASTOLIC BLOOD PRESSURE: 68 MMHG

## 2024-11-01 DIAGNOSIS — Z00.00 ENCOUNTER FOR PREVENTIVE HEALTH EXAMINATION: Primary | ICD-10-CM

## 2024-11-01 DIAGNOSIS — E78.2 MIXED HYPERLIPIDEMIA: ICD-10-CM

## 2024-11-01 LAB
CHOLEST SERPL-MCNC: 166 MG/DL (ref 0–200)
HDLC SERPL QL: 2.24
HDLC SERPL-MCNC: 74 MG/DL (ref 40–60)
LDLC SERPL CALC-MCNC: 77 MG/DL (ref 0–100)
TRIGL SERPL-MCNC: 80 MG/DL (ref 0–150)
VLDLC SERPL-MCNC: 15 MG/DL (ref 5–40)

## 2024-11-01 PROCEDURE — 36415 COLL VENOUS BLD VENIPUNCTURE: CPT | Performed by: INTERNAL MEDICINE

## 2024-11-01 PROCEDURE — 80061 LIPID PANEL: CPT | Performed by: INTERNAL MEDICINE

## 2024-11-01 NOTE — PROGRESS NOTES
Subjective   The ABCs of the Annual Wellness Visit  Medicare Wellness Visit      Kamilla Hawthorne is a 78 y.o. patient who presents for a Medicare Wellness Visit.    The following portions of the patient's history were reviewed and   updated as appropriate: allergies, current medications, past family history, past medical history, past social history, past surgical history, and problem list.    Compared to one year ago, the patient's physical   health is the same.  Compared to one year ago, the patient's mental   health is the same.    Recent Hospitalizations:  This patient has had a [facility] admission record on file within the last 365 days.  Current Medical Providers:  Patient Care Team:  Ishmael Medellin MD as PCP - General (Internal Medicine)  Ishmael Lozano Jr., MD (Dermatology)  Suhail Rothman MD as Consulting Physician (Gastroenterology)  Eros Shah MD as Referring Physician (Thoracic Surgery)  Benjie Valenzuela MD as Consulting Physician (Radiation Oncology)  Jose Decker MD as Consulting Physician (Hematology and Oncology)  Jeanie Rivera RN as Nurse Navigator    Outpatient Medications Prior to Visit   Medication Sig Dispense Refill    acetaminophen (TYLENOL) 500 MG tablet Take 1 tablet by mouth Every 6 (Six) Hours As Needed. Indications: Pain      amLODIPine (NORVASC) 2.5 MG tablet Take 1 tablet by mouth Daily.      atorvastatin (LIPITOR) 10 MG tablet TAKE 1 TABLET BY MOUTH EVERY DAY 90 tablet 1    cholecalciferol (VITAMIN D3) 1000 units tablet Take 1 tablet by mouth Daily. Indications: Vitamin D Deficiency      dorzolamide-timolol (COSOPT) 22.3-6.8 MG/ML ophthalmic solution INSTILL 1 DROP BY OPHTHALMIC ROUTE 2 TIMES EVERY DAY INTO BOTH EYES  6    Eliquis 5 MG tablet tablet TAKE 1 TABLET BY MOUTH 2 (TWO) TIMES A DAY. INDICATIONS: ATRIAL FIBRILLATION 180 tablet 1    latanoprost (XALATAN) 0.005 % ophthalmic solution INSTILL 1 DROP BY OPHTHALMIC ROUTE EVERY DAY BOTH EYES AT BEDTIME  6     "metoprolol tartrate (LOPRESSOR) 25 MG tablet TAKE 0.5 TABLETS BY MOUTH EVERY 12 (TWELVE) HOURS. INDICATIONS: HIGH BLOOD PRESSURE DISORDER 90 tablet 1    valsartan (DIOVAN) 80 MG tablet TAKE 1 TABLET BY MOUTH EVERY DAY FOR HIGH BLOOD PRESSURE 90 tablet 1    vitamin C (ASCORBIC ACID) 500 MG tablet Take 1 tablet by mouth Daily. Indications: Inadequate Vitamin C       No facility-administered medications prior to visit.     No opioid medication identified on active medication list. I have reviewed chart for other potential  high risk medication/s and harmful drug interactions in the elderly.      Aspirin is not on active medication list.  Aspirin use is not indicated based on review of current medical condition/s. Risk of harm outweighs potential benefits.  .    Patient Active Problem List   Diagnosis    Essential hypertension    History of melanoma    History of adenomatous polyp of colon    Family history of colon cancer    Mixed hyperlipidemia    Syncope, unspecified syncope type    Lung mass    Paroxysmal atrial fibrillation    Cognitive dysfunction    Syncope     Advance Care Planning Advance Directive is not on file.  ACP discussion was held with the patient during this visit. Patient has an advance directive (not in EMR), copy requested.            Objective   Vitals:    11/01/24 1308   BP: 110/68   Pulse: 59   Resp: 16   Temp: 97.8 °F (36.6 °C)   TempSrc: Temporal   SpO2: 98%   Weight: 72.6 kg (160 lb)   Height: 166.4 cm (65.5\")   PainSc: 0-No pain       Estimated body mass index is 26.22 kg/m² as calculated from the following:    Height as of this encounter: 166.4 cm (65.5\").    Weight as of this encounter: 72.6 kg (160 lb).            Does the patient have evidence of cognitive impairment? No                                                                                               Health  Risk Assessment    Smoking Status:  Social History     Tobacco Use   Smoking Status Former    Current packs/day: 0.00 "    Average packs/day: 1 pack/day for 20.0 years (20.0 ttl pk-yrs)    Types: Cigarettes    Start date:     Quit date:     Years since quittin.8    Passive exposure: Past   Smokeless Tobacco Never   Tobacco Comments    Caffeine - decaf coffee     Alcohol Consumption:  Social History     Substance and Sexual Activity   Alcohol Use Yes    Alcohol/week: 3.0 standard drinks of alcohol    Types: 3 Glasses of wine per week    Comment: SOCIAL       Fall Risk Screen  STEADI Fall Risk Assessment was completed, and patient is at LOW risk for falls.Assessment completed on:2024    Depression Screenin/1/2024     1:17 PM   PHQ-2/PHQ-9 Depression Screening   Little interest or pleasure in doing things Not at all   Feeling down, depressed, or hopeless Not at all     Health Habits and Functional and Cognitive Screenin/1/2024     1:16 PM   Functional & Cognitive Status   Do you have difficulty preparing food and eating? No   Do you have difficulty bathing yourself, getting dressed or grooming yourself? No   Do you have difficulty using the toilet? No   Do you have difficulty moving around from place to place? Yes   Do you have trouble with steps or getting out of a bed or a chair? Yes   Current Diet Well Balanced Diet   Dental Exam Up to date   Eye Exam Up to date   Exercise (times per week) 2 times per week   Current Exercises Include Home Exercise Program (TV, Computer, Etc.)   Do you need help using the phone?  No   Are you deaf or do you have serious difficulty hearing?  No   Do you need help to go to places out of walking distance? Yes   Do you need help shopping? No   Do you need help preparing meals?  No   Do you need help with housework?  No   Do you need help with laundry? No   Do you need help taking your medications? No   Do you need help managing money? No   Do you ever drive or ride in a car without wearing a seat belt? No   Have you felt unusual stress, anger or loneliness in the last  month? No   Who do you live with? Spouse   If you need help, do you have trouble finding someone available to you? No   Have you been bothered in the last four weeks by sexual problems? No   Do you have difficulty concentrating, remembering or making decisions? No           Age-appropriate Screening Schedule:  Refer to the list below for future screening recommendations based on patient's age, sex and/or medical conditions. Orders for these recommended tests are listed in the plan section. The patient has been provided with a written plan.    Health Maintenance List  Health Maintenance   Topic Date Due    ANNUAL WELLNESS VISIT  10/04/2024    COVID-19 Vaccine (8 - 2023-24 season) 11/06/2024    DXA SCAN  08/11/2031 (Originally 8/11/2018)    BMI FOLLOWUP  01/09/2025    LIPID PANEL  04/29/2025    MAMMOGRAM  10/27/2025    COLORECTAL CANCER SCREENING  02/09/2026    TDAP/TD VACCINES (2 - Td or Tdap) 01/01/2027    HEPATITIS C SCREENING  Completed    RSV Vaccine - Adults  Completed    INFLUENZA VACCINE  Completed    Pneumococcal Vaccine 65+  Completed    ZOSTER VACCINE  Completed    LUNG CANCER SCREENING  Discontinued                                                                                                                                                CMS Preventative Services Quick Reference  Risk Factors Identified During Encounter  Fall Risk-High or Moderate: Information on Fall Prevention Shared in After Visit Summary  Normal Balance     The above risks/problems have been discussed with the patient.  Pertinent information has been shared with the patient in the After Visit Summary.  An After Visit Summary and PPPS were made available to the patient.    Follow Up:   Next Medicare Wellness visit to be scheduled in 1 year.     Assessment & Plan  Encounter for preventive health examination              Follow Up:   No follow-ups on file.

## 2024-11-01 NOTE — PROGRESS NOTES
Subjective   Kamilla Hawthorne is a 78 y.o. female.     Chief Complaint   Patient presents with    Annual Exam    Hyperlipidemia    Hypertension         History of Present Illness  In today for annual preventive exam.  Sleep is good.  Gets 10 hours per night.  Interrupted.  Exercises 2 days/week mainly walking.  Energy is good.  Diet is well-balanced.    Hyperlipidemia  This is a chronic problem. The current episode started more than 1 year ago. Pertinent negatives include no chest pain, myalgias or shortness of breath.   Hypertension  This is a chronic problem. The current episode started more than 1 year ago. Pertinent negatives include no chest pain, headaches, palpitations or shortness of breath.        The following portions of the patient's history were reviewed and updated as appropriate: allergies, current medications, past social history and problem list.    Outpatient Medications Marked as Taking for the 11/1/24 encounter (Office Visit) with Ishmael Medellin MD   Medication Sig Dispense Refill    acetaminophen (TYLENOL) 500 MG tablet Take 1 tablet by mouth Every 6 (Six) Hours As Needed. Indications: Pain      amLODIPine (NORVASC) 2.5 MG tablet Take 1 tablet by mouth Daily.      atorvastatin (LIPITOR) 10 MG tablet TAKE 1 TABLET BY MOUTH EVERY DAY 90 tablet 1    cholecalciferol (VITAMIN D3) 1000 units tablet Take 1 tablet by mouth Daily. Indications: Vitamin D Deficiency      dorzolamide-timolol (COSOPT) 22.3-6.8 MG/ML ophthalmic solution INSTILL 1 DROP BY OPHTHALMIC ROUTE 2 TIMES EVERY DAY INTO BOTH EYES  6    Eliquis 5 MG tablet tablet TAKE 1 TABLET BY MOUTH 2 (TWO) TIMES A DAY. INDICATIONS: ATRIAL FIBRILLATION 180 tablet 1    latanoprost (XALATAN) 0.005 % ophthalmic solution INSTILL 1 DROP BY OPHTHALMIC ROUTE EVERY DAY BOTH EYES AT BEDTIME  6    metoprolol tartrate (LOPRESSOR) 25 MG tablet TAKE 0.5 TABLETS BY MOUTH EVERY 12 (TWELVE) HOURS. INDICATIONS: HIGH BLOOD PRESSURE DISORDER 90 tablet 1     valsartan (DIOVAN) 80 MG tablet TAKE 1 TABLET BY MOUTH EVERY DAY FOR HIGH BLOOD PRESSURE 90 tablet 1    vitamin C (ASCORBIC ACID) 500 MG tablet Take 1 tablet by mouth Daily. Indications: Inadequate Vitamin C         Review of Systems   Constitutional:  Negative for appetite change, chills, diaphoresis, fatigue, fever and unexpected weight change.   HENT:  Positive for hearing loss.    Respiratory:  Negative for cough, chest tightness, shortness of breath and wheezing.    Cardiovascular:  Negative for chest pain, palpitations and leg swelling.   Gastrointestinal:  Negative for abdominal pain, anal bleeding, blood in stool, constipation, diarrhea, nausea, rectal pain and vomiting.   Endocrine: Negative for cold intolerance, heat intolerance and polyuria.   Genitourinary:  Negative for difficulty urinating, dysuria, flank pain, frequency, hematuria and urgency.   Musculoskeletal:  Negative for arthralgias, back pain and myalgias.   Allergic/Immunologic: Negative for environmental allergies.   Neurological:  Negative for dizziness, syncope, speech difficulty, weakness, light-headedness, numbness and headaches.   Hematological:  Does not bruise/bleed easily.   Psychiatric/Behavioral:  Negative for agitation, confusion, dysphoric mood and sleep disturbance. The patient is not nervous/anxious.        Objective   Vitals:    11/01/24 1308   BP: 110/68   Pulse: 59   Resp: 16   Temp: 97.8 °F (36.6 °C)   SpO2: 98%      Wt Readings from Last 3 Encounters:   11/01/24 72.6 kg (160 lb)   10/24/24 74.4 kg (164 lb)   09/04/24 75.1 kg (165 lb 9.6 oz)    Body mass index is 26.22 kg/m².      Physical Exam  Vitals and nursing note reviewed.   Constitutional:       Appearance: Normal appearance. She is well-developed.   HENT:      Right Ear: Tympanic membrane and external ear normal.      Left Ear: Tympanic membrane and external ear normal.      Nose: Nose normal.   Eyes:      Extraocular Movements: Extraocular movements intact.       Conjunctiva/sclera: Conjunctivae normal.      Pupils: Pupils are equal, round, and reactive to light.   Neck:      Thyroid: No thyromegaly.      Vascular: No JVD.   Cardiovascular:      Rate and Rhythm: Normal rate and regular rhythm.      Heart sounds: Normal heart sounds. No murmur heard.     No gallop.   Pulmonary:      Effort: Pulmonary effort is normal. No respiratory distress.      Breath sounds: Normal breath sounds. No wheezing or rales.   Abdominal:      General: Bowel sounds are normal. There is no distension.      Palpations: Abdomen is soft. There is no mass.      Tenderness: There is no abdominal tenderness. There is no guarding.      Hernia: No hernia is present.   Musculoskeletal:         General: Normal range of motion.      Cervical back: Normal range of motion and neck supple.   Lymphadenopathy:      Cervical: No cervical adenopathy.   Skin:     General: Skin is warm and dry.   Neurological:      General: No focal deficit present.      Mental Status: She is alert and oriented to person, place, and time.      Cranial Nerves: No cranial nerve deficit.      Coordination: Coordination normal.      Deep Tendon Reflexes: Reflexes normal.   Psychiatric:         Mood and Affect: Mood normal.         Behavior: Behavior normal.         Thought Content: Thought content normal.         Judgment: Judgment normal.       Procedures      Problems Addressed this Visit    None  Visit Diagnoses       Encounter for preventive health examination    -  Primary          Diagnoses         Codes Comments    Encounter for preventive health examination    -  Primary ICD-10-CM: Z00.00  ICD-9-CM: V70.0           Assessment & Plan   In for annual preventive exam and annual wellness visit today November 2024.  She has a history of hypertension, hyperlipidemia and PAF.  Likely lung cancer with a PET avid mass in the right upper lung.  She has either cognitive dysfunction or hearing loss or both as she has trouble with answering  questions and following directions today.  Previously declined lung biopsy.  Treated with radiation therapy.  Family history of colon cancer and personal history of adenomatous colon polyps.  Distant history of malignant melanoma.  She relates 3 near syncopal episodes between 2019 and 2021.  They sound like pretty typical vasovagal episodes.  We checked a TTE and a 24-hour Holter.  It does not appear that dehydration was an issue.  I suppose she might of accidentally overtaken her medicine.  No problems since then.  She is due for comprehensive lab work March 2024 including a CBC, CMP.  We will plan to monitor at 6-month intervals with lipids every 6 months.  Vaccinations are up-to-date including flu, COVID and RSV.  Left ear is about 50% wax occluded and irrigated free today.    Prevention counseling was performed today. The counseling performed was routine health maintenance topics including BMI and exercise.    The above information was reviewed again today 11/01/24.  It continues to be accurate as reflected above and is unchanged.  History, physical and review of systems all reviewed and are unchanged.  Medications were reviewed today and continue the current dosing.         Dragon disclaimer:   Much of this encounter note is an electronic transcription/translation of spoken language to printed text. The electronic translation of spoken language may permit erroneous, or at times, nonsensical words or phrases to be inadvertently transcribed; Although I have reviewed the note for such errors, some may still exist.

## 2024-11-05 ENCOUNTER — PATIENT OUTREACH (OUTPATIENT)
Dept: OTHER | Facility: HOSPITAL | Age: 79
End: 2024-11-05
Payer: MEDICARE

## 2024-11-05 DIAGNOSIS — R91.8 LUNG MASS: Primary | ICD-10-CM

## 2024-11-05 NOTE — PROGRESS NOTES
Reviewed chart. Rec'd 5 SBRT 10/14-10/24/24; plan f/u 3 mths    Called patient, spoke with her  (she is in the shower).  He states she is doing well after radiation other than complaints of fatigue.  We discussed common side effects to radiation, average duration and onset of these.  We also discussed the first surveillance scan in 3 months. Looks like the order has not been placed. I will contact Dr. Valenzuela about this.      We discussed the call they rec'd about Survivorship. I discussed what this visit entails.  They are not interested at this time as they are overwhelmed with appts and information at this time.  We discussed this can occur during and after cancer treatment.    We again discussed integrative therapies and other [facility] at [facility] as well as [employer] and [facility]. Patient's  expressed gratitude for my support and denied any additional needs at this time. I will continue to follow; encouraged patient/ to call as needed.        Message to Dr. Valenzuela about 3 mth CT scan

## 2024-11-06 ENCOUNTER — PATIENT OUTREACH (OUTPATIENT)
Dept: OTHER | Facility: HOSPITAL | Age: 79
End: 2024-11-06
Payer: MEDICARE

## 2024-11-06 NOTE — PROGRESS NOTES
Call from patient. She stated that she spoke with Dr. Valenzuela's office. The patient has been scheduled for her CT can and f/u Dr. Valenzuela 2/14/25.    The patient plans on connecting with [employer] and trying their Yogi Chi class.  I gave the days/times per the most recent [employer] brochure. Offered to give the patient the number for [employer] although she says she already has it.    I will continue to follow; encouraged her to call as needed.

## 2024-11-08 ENCOUNTER — TRANSCRIBE ORDERS (OUTPATIENT)
Dept: ADMINISTRATIVE | Facility: HOSPITAL | Age: 79
End: 2024-11-08
Payer: MEDICARE

## 2024-11-08 DIAGNOSIS — Z12.31 SCREENING MAMMOGRAM, ENCOUNTER FOR: Primary | ICD-10-CM

## 2024-12-30 RX ORDER — AMLODIPINE BESYLATE 2.5 MG/1
TABLET ORAL
Qty: 90 TABLET | Refills: 1 | Status: SHIPPED | OUTPATIENT
Start: 2024-12-30

## 2024-12-30 RX ORDER — VALSARTAN 80 MG/1
80 TABLET ORAL DAILY
Qty: 90 TABLET | Refills: 1 | Status: SHIPPED | OUTPATIENT
Start: 2024-12-30

## 2025-02-05 ENCOUNTER — HOSPITAL ENCOUNTER (OUTPATIENT)
Facility: HOSPITAL | Age: 80
Discharge: HOME | End: 2025-02-05
Admitting: STUDENT IN AN ORGANIZED HEALTH CARE EDUCATION/TRAINING PROGRAM
Payer: MEDICARE

## 2025-02-05 DIAGNOSIS — R91.8 LUNG MASS: ICD-10-CM

## 2025-02-05 PROCEDURE — 71250 CT THORAX DX C-: CPT

## 2025-02-12 NOTE — PROGRESS NOTES
[facility] Radiation Oncology   Follow Up    Chief Complaint  RUL Lung Cancer      Diagnosis: Recurrent RUL Lung Cancer s/p SBRT      Radiation Completion Date: 7/24/2023        Prescription:      Site: Right Upper Lobe  Laterality: Right  Total Dose: 4800cGy  Dose per Fraction: 1200cGy  Total Fractions: 4  Daily or BID: Daily  Modality: Photon  Technique: SBRT (2-5fx)  Bolus: No      Radiation Completion Date: 10/24/2024       Prescription:      Site: RUL  Laterality: Right  Total Dose: 5000cGy  Dose per Fraction: 1000cGy  Total Fractions: 5  Daily or BID:  QOD  Modality: Photon  Technique: SBRT (2-5fx)  Bolus: No      Interval History:    Kamilla Hawthorne presents for regularly scheduled follow-up approximately 18 months after completion of radiation therapy for a right upper lobe lung cancer.  Late last year she had recurrence slightly inferior to her prior radiation field.  She received radiation to this lesion which completed in October 2024.  The patient has been struggling with dementia, but no other new or concerning pulmonary complaints.        Imaging:      CT Chest 2/5/2025    IMPRESSION:  1. Interval radiation treatment to a cluster of nodularity in the  superior segment of the right lower lobe which has decreased in size.  There is new adjacent lung consolidation consistent with radiation  fibrosis  2. 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 Stable groundglass opacities in the left lung      Pathology:      No new relevant pathology       Labs:    Lab Results   Component Value Date    CREATININE 0.70 03/24/2024             Problem List:  Patient Active Problem List   Diagnosis    Essential hypertension    History of melanoma    History of adenomatous polyp of colon    Family history of colon cancer    Mixed hyperlipidemia    Syncope, unspecified syncope type    Lung mass    Paroxysmal atrial fibrillation    Cognitive dysfunction    Syncope          Medications:  Current  "Outpatient Medications on File Prior to Visit   Medication Sig Dispense Refill    acetaminophen (TYLENOL) 500 MG tablet Take 1 tablet by mouth Every 6 (Six) Hours As Needed. Indications: Pain      amLODIPine (NORVASC) 2.5 MG tablet TAKE 1 TABLET BY MOUTH DAILY. 2.5 MG DAILY INDICATIONS: HIGH BLOOD PRESSURE DISORDER 90 tablet 1    cholecalciferol (VITAMIN D3) 1000 units tablet Take 1 tablet by mouth Daily. Indications: Vitamin D Deficiency      dorzolamide-timolol (COSOPT) 22.3-6.8 MG/ML ophthalmic solution INSTILL 1 DROP BY OPHTHALMIC ROUTE 2 TIMES EVERY DAY INTO BOTH EYES  6    Eliquis 5 MG tablet tablet TAKE 1 TABLET BY MOUTH 2 (TWO) TIMES A DAY. INDICATIONS: ATRIAL FIBRILLATION 180 tablet 1    latanoprost (XALATAN) 0.005 % ophthalmic solution INSTILL 1 DROP BY OPHTHALMIC ROUTE EVERY DAY BOTH EYES AT BEDTIME  6    valsartan (DIOVAN) 80 MG tablet TAKE 1 TABLET BY MOUTH EVERY DAY FOR HIGH BLOOD PRESSURE 90 tablet 1    vitamin C (ASCORBIC ACID) 500 MG tablet Take 1 tablet by mouth Daily. Indications: Inadequate Vitamin C      [DISCONTINUED] atorvastatin (LIPITOR) 10 MG tablet TAKE 1 TABLET BY MOUTH EVERY DAY 90 tablet 1    [DISCONTINUED] metoprolol tartrate (LOPRESSOR) 25 MG tablet TAKE 0.5 TABLETS BY MOUTH EVERY 12 (TWELVE) HOURS. INDICATIONS: HIGH BLOOD PRESSURE DISORDER 90 tablet 1     No current facility-administered medications on file prior to visit.          Allergies:  Allergies   Allergen Reactions    Penicillins Itching         Vital Signs:  /92   Pulse 56   Wt 74.4 kg (164 lb)   SpO2 99%   BMI 26.88 kg/m²   Estimated body mass index is 26.88 kg/m² as calculated from the following:    Height as of 11/1/24: 166.4 cm (65.5\").    Weight as of this encounter: 74.4 kg (164 lb).  Pain Score    02/14/25 1530   PainSc:   9   PainLoc: Back         ECOG: Capable of only limited selfcare; confined to bed or chair more than 50% of waking hours = 3    Physical Exam  Vitals reviewed.   Constitutional:       " General: She is not in acute distress.     Appearance: Normal appearance.   HENT:      Head: Normocephalic and atraumatic.   Eyes:      Extraocular Movements: Extraocular movements intact.      Pupils: Pupils are equal, round, and reactive to light.   Pulmonary:      Effort: Pulmonary effort is normal.   Abdominal:      General: Abdomen is flat.      Palpations: Abdomen is soft.   Musculoskeletal:      Cervical back: Normal range of motion.   Skin:     General: Skin is warm and dry.   Neurological:      General: No focal deficit present.      Mental Status: She is alert and oriented to person, place, and time.   Psychiatric:         Mood and Affect: Mood normal.         Behavior: Behavior normal.          Result Review :  The following data was reviewed by: Benjie Valenzuela MD on 02/14/2025:  Labs: Last Creatinine   Data reviewed : Radiologic studies CT Chest             Diagnoses and all orders for this visit:    1. Lung mass (Primary)        Assessment:    Kamilla Hawthorne presents for regularly scheduled follow-up approximately 18 months after completion of radiation therapy for a right upper lobe lung cancer.  Late last year she had recurrence slightly inferior to her prior radiation field.  She received radiation to this lesion which completed in October 2024.  The patient has been struggling with dementia, but no other new or concerning pulmonary complaints.    Met with the patient and discussed her interval history in detail.  I reviewed the results of her most recent CT chest.  Overall, this is consistent with decrease in size of her treated lesions.  There were no other new or concerning findings.  There is postradiation changes around the area of her prior treatment as well as her most recent treatment.  We will continue to monitor with CT chest in 3 months.      Plan:    -Follow-up in 3 months with CT chest       I spent 30 minutes caring for Kamilla on this date of service. This time includes time spent by me  in the following activities:preparing for the visit, reviewing tests, obtaining and/or reviewing a separately obtained history, documenting information in the medical record, independently interpreting results and communicating that information with the patient/family/caregiver, and care coordination  Follow Up   No follow-ups on file.  Patient was given instructions and counseling regarding her condition or for health maintenance advice. Please see specific information pulled into the AVS if appropriate.     Benjie Valenzuela MD

## 2025-02-13 ENCOUNTER — TELEPHONE (OUTPATIENT)
Dept: RADIATION ONCOLOGY | Facility: HOSPITAL | Age: 80
End: 2025-02-13
Payer: MEDICARE

## 2025-02-14 ENCOUNTER — OFFICE VISIT (OUTPATIENT)
Dept: RADIATION ONCOLOGY | Facility: HOSPITAL | Age: 80
End: 2025-02-14
Payer: MEDICARE

## 2025-02-14 VITALS
DIASTOLIC BLOOD PRESSURE: 92 MMHG | WEIGHT: 164 LBS | BODY MASS INDEX: 26.88 KG/M2 | SYSTOLIC BLOOD PRESSURE: 173 MMHG | OXYGEN SATURATION: 99 % | HEART RATE: 56 BPM

## 2025-02-14 DIAGNOSIS — R91.8 LUNG MASS: Primary | ICD-10-CM

## 2025-02-14 PROCEDURE — G0463 HOSPITAL OUTPT CLINIC VISIT: HCPCS | Performed by: STUDENT IN AN ORGANIZED HEALTH CARE EDUCATION/TRAINING PROGRAM

## 2025-02-16 DIAGNOSIS — E78.2 MIXED HYPERLIPIDEMIA: Chronic | ICD-10-CM

## 2025-02-17 ENCOUNTER — PATIENT OUTREACH (OUTPATIENT)
Dept: OTHER | Facility: HOSPITAL | Age: 80
End: 2025-02-17
Payer: MEDICARE

## 2025-02-17 RX ORDER — METOPROLOL TARTRATE 25 MG/1
12.5 TABLET, FILM COATED ORAL EVERY 12 HOURS SCHEDULED
Qty: 90 TABLET | Refills: 1 | Status: SHIPPED | OUTPATIENT
Start: 2025-02-17

## 2025-02-17 RX ORDER — ATORVASTATIN CALCIUM 10 MG/1
TABLET, FILM COATED ORAL
Qty: 90 TABLET | Refills: 1 | Status: SHIPPED | OUTPATIENT
Start: 2025-02-17

## 2025-02-17 NOTE — PROGRESS NOTES
Reviewed chart. Rec'd 5 SBRT for recurrent RUL cancer 10/14-10/24/24, met with Dr. Valenzuela 2/14. 2/5 CT scan showed area had decreased. Plan CT scan in 3 months    Called patient, unable to reach her.  Spoke with patient's . We discussed her appt with Dr. Valenzuela last week. They were very pleased with her CT results.    They are in the process of moving to independent living this Friday.     The patient/ would like to cancel her survivorship visit due to the move.  They will call back to reschedule.     The patient denies any questions/concerns or ongoing resource needs.    Since she is stable, I will close her case to navigation. Encouraged patient/family to call in the future if needed.

## 2025-02-18 ENCOUNTER — TELEPHONE (OUTPATIENT)
Dept: RADIATION ONCOLOGY | Facility: HOSPITAL | Age: 80
End: 2025-02-18
Payer: MEDICARE

## 2025-02-18 DIAGNOSIS — R91.8 LUNG MASS: Primary | ICD-10-CM

## 2025-02-18 NOTE — TELEPHONE ENCOUNTER
Called pt to give her CT appt that was scheduled and schedule her follow up with Dr. Valenzuela. Pt did not answer, LVM on both home and mobile number.

## 2025-02-20 ENCOUNTER — HOSPITAL ENCOUNTER (OUTPATIENT)
Dept: MAMMOGRAPHY | Facility: HOSPITAL | Age: 80
Discharge: HOME | End: 2025-02-20
Admitting: STUDENT IN AN ORGANIZED HEALTH CARE EDUCATION/TRAINING PROGRAM
Payer: MEDICARE

## 2025-02-20 DIAGNOSIS — Z12.31 SCREENING MAMMOGRAM, ENCOUNTER FOR: ICD-10-CM

## 2025-02-20 PROCEDURE — 77063 BREAST TOMOSYNTHESIS BI: CPT

## 2025-02-20 PROCEDURE — 77067 SCR MAMMO BI INCL CAD: CPT

## 2025-02-25 ENCOUNTER — TELEPHONE (OUTPATIENT)
Dept: RADIATION ONCOLOGY | Facility: HOSPITAL | Age: 80
End: 2025-02-25
Payer: MEDICARE

## 2025-02-25 NOTE — TELEPHONE ENCOUNTER
Call placed to patient in regards to Mammogram results per MD order. Patient did not answer LVM to call office back

## 2025-03-17 ENCOUNTER — TELEPHONE (OUTPATIENT)
Dept: INTERNAL MEDICINE | Facility: CLINIC | Age: 80
End: 2025-03-17

## 2025-03-17 DIAGNOSIS — Z74.09 IMMOBILITY: Primary | ICD-10-CM

## 2025-03-17 NOTE — TELEPHONE ENCOUNTER
Caller: Cesario Brambila    Relationship: Emergency Contact    Best call back number:      [phone]       What is the medical concern/diagnosis: PHYSICAL THERAPY     What specialty or service is being requested: PHYSICAL THERAPY TWICE A WEEK.     What is the provider, practice or medical service name: [facility]    What is the office location: [facility]     What is the office phone number: [phone]    FAX NUMBER- [phone]    Any additional details: PATIENT'S DAUGHTER CALLING IN STATING PATIENT JUST MOVED TO AN INDEPENDENT LIVING FACILITY.     SHE IS NEEDING ORDERS FOR PHYSICAL THERAPY BECAUSE SHE IS HAVING TROUBLE GETTING IN AND OUT OF THE BED.     PLEASE CALL DAUGHTER TO CONFIRM OR DENY.

## 2025-05-05 ENCOUNTER — OFFICE VISIT (OUTPATIENT)
Dept: INTERNAL MEDICINE | Facility: CLINIC | Age: 80
End: 2025-05-05
Payer: MEDICARE

## 2025-05-05 ENCOUNTER — LAB (OUTPATIENT)
Dept: LAB | Facility: HOSPITAL | Age: 80
End: 2025-05-05
Payer: MEDICARE

## 2025-05-05 VITALS
HEIGHT: 66 IN | DIASTOLIC BLOOD PRESSURE: 68 MMHG | HEART RATE: 86 BPM | BODY MASS INDEX: 26.52 KG/M2 | WEIGHT: 165 LBS | SYSTOLIC BLOOD PRESSURE: 112 MMHG | RESPIRATION RATE: 16 BRPM | TEMPERATURE: 97.7 F | OXYGEN SATURATION: 97 %

## 2025-05-05 DIAGNOSIS — E78.2 MIXED HYPERLIPIDEMIA: Chronic | ICD-10-CM

## 2025-05-05 DIAGNOSIS — I48.0 PAROXYSMAL ATRIAL FIBRILLATION: ICD-10-CM

## 2025-05-05 DIAGNOSIS — Z79.899 MEDICATION MANAGEMENT: ICD-10-CM

## 2025-05-05 DIAGNOSIS — I10 ESSENTIAL HYPERTENSION: Primary | Chronic | ICD-10-CM

## 2025-05-05 DIAGNOSIS — R73.09 ABNORMAL BLOOD SUGAR: Primary | ICD-10-CM

## 2025-05-05 LAB
ALBUMIN SERPL-MCNC: 4.2 G/DL (ref 3.5–5.2)
ALBUMIN/GLOB SERPL: 1.4 G/DL
ALP SERPL-CCNC: 130 U/L (ref 39–117)
ALT SERPL W P-5'-P-CCNC: 20 U/L (ref 1–33)
ANION GAP SERPL CALCULATED.3IONS-SCNC: 13.6 MMOL/L (ref 5–15)
AST SERPL-CCNC: 23 U/L (ref 1–32)
BASOPHILS # BLD AUTO: 0.05 10*3/MM3 (ref 0–0.2)
BASOPHILS NFR BLD AUTO: 0.7 % (ref 0–1.5)
BILIRUB SERPL-MCNC: 0.9 MG/DL (ref 0–1.2)
BUN SERPL-MCNC: 16 MG/DL (ref 8–23)
BUN/CREAT SERPL: 18.2 (ref 7–25)
CALCIUM SPEC-SCNC: 9.3 MG/DL (ref 8.6–10.5)
CHLORIDE SERPL-SCNC: 104 MMOL/L (ref 98–107)
CHOLEST SERPL-MCNC: 188 MG/DL (ref 0–200)
CO2 SERPL-SCNC: 24.4 MMOL/L (ref 22–29)
CREAT SERPL-MCNC: 0.88 MG/DL (ref 0.57–1)
DEPRECATED RDW RBC AUTO: 43.1 FL (ref 37–54)
EGFRCR SERPLBLD CKD-EPI 2021: 66.9 ML/MIN/1.73
EOSINOPHIL # BLD AUTO: 0.05 10*3/MM3 (ref 0–0.4)
EOSINOPHIL NFR BLD AUTO: 0.7 % (ref 0.3–6.2)
ERYTHROCYTE [DISTWIDTH] IN BLOOD BY AUTOMATED COUNT: 12.8 % (ref 12.3–15.4)
GLOBULIN UR ELPH-MCNC: 3.1 GM/DL
GLUCOSE SERPL-MCNC: 182 MG/DL (ref 65–99)
HBA1C MFR BLD: 5.5 % (ref 4.8–5.6)
HCT VFR BLD AUTO: 44.7 % (ref 34–46.6)
HDLC SERPL QL: 2.09
HDLC SERPL-MCNC: 90 MG/DL (ref 40–60)
HGB BLD-MCNC: 14.7 G/DL (ref 12–15.9)
IMM GRANULOCYTES # BLD AUTO: 0.05 10*3/MM3 (ref 0–0.05)
IMM GRANULOCYTES NFR BLD AUTO: 0.7 % (ref 0–0.5)
LDLC SERPL CALC-MCNC: 77 MG/DL (ref 0–100)
LYMPHOCYTES # BLD AUTO: 1.66 10*3/MM3 (ref 0.7–3.1)
LYMPHOCYTES NFR BLD AUTO: 22.4 % (ref 19.6–45.3)
MCH RBC QN AUTO: 30.5 PG (ref 26.6–33)
MCHC RBC AUTO-ENTMCNC: 32.9 G/DL (ref 31.5–35.7)
MCV RBC AUTO: 92.7 FL (ref 79–97)
MONOCYTES # BLD AUTO: 0.4 10*3/MM3 (ref 0.1–0.9)
MONOCYTES NFR BLD AUTO: 5.4 % (ref 5–12)
NEUTROPHILS NFR BLD AUTO: 5.19 10*3/MM3 (ref 1.7–7)
NEUTROPHILS NFR BLD AUTO: 70.1 % (ref 42.7–76)
NRBC BLD AUTO-RTO: 0 /100 WBC (ref 0–0.2)
PLATELET # BLD AUTO: 277 10*3/MM3 (ref 140–450)
PMV BLD AUTO: 9.2 FL (ref 6–12)
POTASSIUM SERPL-SCNC: 3.9 MMOL/L (ref 3.5–5.2)
PROT SERPL-MCNC: 7.3 G/DL (ref 6–8.5)
RBC # BLD AUTO: 4.82 10*6/MM3 (ref 3.77–5.28)
SODIUM SERPL-SCNC: 142 MMOL/L (ref 136–145)
TRIGL SERPL-MCNC: 127 MG/DL (ref 0–150)
VLDLC SERPL-MCNC: 21 MG/DL (ref 5–40)
WBC NRBC COR # BLD AUTO: 7.4 10*3/MM3 (ref 3.4–10.8)

## 2025-05-05 PROCEDURE — 80053 COMPREHEN METABOLIC PANEL: CPT | Performed by: INTERNAL MEDICINE

## 2025-05-05 PROCEDURE — 36415 COLL VENOUS BLD VENIPUNCTURE: CPT | Performed by: INTERNAL MEDICINE

## 2025-05-05 PROCEDURE — 83036 HEMOGLOBIN GLYCOSYLATED A1C: CPT | Performed by: INTERNAL MEDICINE

## 2025-05-05 PROCEDURE — 80061 LIPID PANEL: CPT | Performed by: INTERNAL MEDICINE

## 2025-05-05 PROCEDURE — 85025 COMPLETE CBC W/AUTO DIFF WBC: CPT | Performed by: INTERNAL MEDICINE

## 2025-05-05 NOTE — PROGRESS NOTES
Subjective   Kamilla Hawthorne is a 79 y.o. female.     Chief Complaint   Patient presents with    Hyperlipidemia    Hypertension         Hyperlipidemia  This is a chronic problem. The current episode started more than 1 year ago. Pertinent negatives include no chest pain or shortness of breath.   Hypertension  Chronicity:  Chronic  Onset:  More than 1 year ago  Associated symptoms: no chest pain, no palpitations and no shortness of breath         The following portions of the patient's history were reviewed and updated as appropriate: allergies, current medications, past social history and problem list.    Outpatient Medications Marked as Taking for the 5/5/25 encounter (Office Visit) with Ishmael Medellin MD   Medication Sig Dispense Refill    acetaminophen (TYLENOL) 500 MG tablet Take 1 tablet by mouth Every 6 (Six) Hours As Needed. Indications: Pain      amLODIPine (NORVASC) 2.5 MG tablet TAKE 1 TABLET BY MOUTH DAILY. 2.5 MG DAILY INDICATIONS: HIGH BLOOD PRESSURE DISORDER 90 tablet 1    atorvastatin (LIPITOR) 10 MG tablet TAKE 1 TABLET BY MOUTH EVERY DAY 90 tablet 1    cholecalciferol (VITAMIN D3) 1000 units tablet Take 1 tablet by mouth Daily. Indications: Vitamin D Deficiency      dorzolamide-timolol (COSOPT) 22.3-6.8 MG/ML ophthalmic solution INSTILL 1 DROP BY OPHTHALMIC ROUTE 2 TIMES EVERY DAY INTO BOTH EYES  6    Eliquis 5 MG tablet tablet TAKE 1 TABLET BY MOUTH 2 (TWO) TIMES A DAY. INDICATIONS: ATRIAL FIBRILLATION 180 tablet 1    latanoprost (XALATAN) 0.005 % ophthalmic solution INSTILL 1 DROP BY OPHTHALMIC ROUTE EVERY DAY BOTH EYES AT BEDTIME  6    metoprolol tartrate (LOPRESSOR) 25 MG tablet TAKE 0.5 TABLETS BY MOUTH EVERY 12 (TWELVE) HOURS. INDICATIONS: HIGH BLOOD PRESSURE DISORDER 90 tablet 1    valsartan (DIOVAN) 80 MG tablet TAKE 1 TABLET BY MOUTH EVERY DAY FOR HIGH BLOOD PRESSURE 90 tablet 1    vitamin C (ASCORBIC ACID) 500 MG tablet Take 1 tablet by mouth Daily. Indications: Inadequate Vitamin C          Review of Systems   Respiratory:  Negative for shortness of breath and wheezing.    Cardiovascular:  Positive for leg swelling. Negative for chest pain and palpitations.   Neurological:  Positive for light-headedness.       Objective   Vitals:    05/05/25 1359   BP: 112/68   Pulse: 86   Resp: 16   Temp: 97.7 °F (36.5 °C)   SpO2: 97%      Wt Readings from Last 3 Encounters:   05/05/25 74.8 kg (165 lb)   02/14/25 74.4 kg (164 lb)   11/01/24 72.6 kg (160 lb)    Body mass index is 27.04 kg/m².      Physical Exam  Constitutional:       Appearance: Normal appearance. She is well-developed.   Neck:      Thyroid: No thyromegaly.   Cardiovascular:      Rate and Rhythm: Normal rate and regular rhythm.      Heart sounds: Normal heart sounds. No murmur heard.     No gallop.   Pulmonary:      Effort: Pulmonary effort is normal. No respiratory distress.      Breath sounds: Normal breath sounds. No wheezing or rales.   Abdominal:      General: Bowel sounds are normal.      Palpations: Abdomen is soft. There is no mass.      Tenderness: There is no abdominal tenderness. There is no guarding.   Neurological:      Mental Status: She is alert.           Problems Addressed this Visit          Cardiac and Vasculature    Mixed hyperlipidemia (Chronic)    Essential hypertension - Primary (Chronic)    Paroxysmal atrial fibrillation     Diagnoses         Codes Comments      Essential hypertension    -  Primary ICD-10-CM: I10  ICD-9-CM: 401.9       Mixed hyperlipidemia     ICD-10-CM: E78.2  ICD-9-CM: 272.2       Paroxysmal atrial fibrillation     ICD-10-CM: I48.0  ICD-9-CM: 427.31           Assessment & Plan   In today May 2025 for recheck of hypertension, hyperlipidemia and atrial fibrillation.  Memory seems to be declining.  Had an MRI of the brain in 2023 that was unremarkable.  She has had 1 B12 level that was normal.  She is now in the assisted living facility and thriving with more social interactions there.   She will get some  thigh-high support hose and continue to monitor her blood pressure at home.  Annual preventive exam was November 2024.  Annual lab work is due today May 2025 including CBC, CMP, lipids.    The above information was reviewed again today 05/05/25.  It continues to be accurate as reflected above and is unchanged.  History, physical and review of systems all reviewed and are unchanged.  Medications were reviewed today and continue the current dosing.             Dragon disclaimer:   Part of this note may be an electronic transcription/translation of spoken language to printed text using the Dragon Dictation System.

## 2025-05-19 ENCOUNTER — HOSPITAL ENCOUNTER (OUTPATIENT)
Facility: HOSPITAL | Age: 80
Discharge: HOME | End: 2025-05-19
Admitting: STUDENT IN AN ORGANIZED HEALTH CARE EDUCATION/TRAINING PROGRAM
Payer: MEDICARE

## 2025-05-19 DIAGNOSIS — R91.8 LUNG MASS: ICD-10-CM

## 2025-05-19 PROCEDURE — 71250 CT THORAX DX C-: CPT

## 2025-05-30 ENCOUNTER — OFFICE VISIT (OUTPATIENT)
Dept: RADIATION ONCOLOGY | Facility: HOSPITAL | Age: 80
End: 2025-05-30
Payer: MEDICARE

## 2025-05-30 VITALS
OXYGEN SATURATION: 97 % | RESPIRATION RATE: 16 BRPM | HEART RATE: 59 BPM | SYSTOLIC BLOOD PRESSURE: 170 MMHG | DIASTOLIC BLOOD PRESSURE: 86 MMHG

## 2025-05-30 DIAGNOSIS — C34.11 PRIMARY CANCER OF RIGHT UPPER LOBE OF LUNG: Primary | ICD-10-CM

## 2025-05-30 PROCEDURE — G0463 HOSPITAL OUTPT CLINIC VISIT: HCPCS | Performed by: STUDENT IN AN ORGANIZED HEALTH CARE EDUCATION/TRAINING PROGRAM

## 2025-05-30 NOTE — PROGRESS NOTES
[facility] Radiation Oncology   Follow Up    Chief Complaint  RUL Lung Cancer        Diagnosis: Recurrent RUL Lung Cancer s/p SBRT        Radiation Completion Date: 7/24/2023        Prescription:      Site: Right Upper Lobe  Laterality: Right  Total Dose: 4800cGy  Dose per Fraction: 1200cGy  Total Fractions: 4  Daily or BID: Daily  Modality: Photon  Technique: SBRT (2-5fx)  Bolus: No        Radiation Completion Date: 10/24/2024        Prescription:      Site: RUL  Laterality: Right  Total Dose: 5000cGy  Dose per Fraction: 1000cGy  Total Fractions: 5  Daily or BID:  QOD  Modality: Photon  Technique: SBRT (2-5fx)  Bolus: No      Interval History:    Kamilla Hawthorne presents for regularly scheduled follow-up approximately 22 months after completion of radiation therapy for a right upper lobe lung cancer.  Late last year she had recurrence slightly inferior to her prior radiation field.  She received radiation to this lesion which completed in October 2024. The patient has been struggling with dementia, but no other new or concerning pulmonary complaints.  They have moved into assisted living.      Imaging:      CT Chest 5/19/2025    IMPRESSION:  1.  Interval decrease in the degree of masslike pulmonary consolidation  within the right upper and superior aspects of the right lower lobe  since 2/5/2025, at least in part representing evolving postradiation  changes. Nodular area of opacification within the medial aspect of the  right lower lobe measuring up to 0.6 cm grossly unchanged. Residual  malignancy cannot be excluded and continued close interval follow-up is  recommended.  2.  Scattered nodular groundglass opacification with index areas which  appear grossly similar over multiple prior CTs.  3.  Other findings above      Pathology:      No new relevant pathology       Labs:    Lab Results   Component Value Date    CREATININE 0.88 05/05/2025             Problem List:  Patient Active Problem List   Diagnosis    Essential  "hypertension    History of melanoma    History of adenomatous polyp of colon    Family history of colon cancer    Mixed hyperlipidemia    Syncope, unspecified syncope type    Lung mass    Paroxysmal atrial fibrillation    Cognitive dysfunction    Syncope          Medications:  Current Outpatient Medications on File Prior to Visit   Medication Sig Dispense Refill    acetaminophen (TYLENOL) 500 MG tablet Take 1 tablet by mouth Every 6 (Six) Hours As Needed. Indications: Pain      amLODIPine (NORVASC) 2.5 MG tablet TAKE 1 TABLET BY MOUTH DAILY. 2.5 MG DAILY INDICATIONS: HIGH BLOOD PRESSURE DISORDER 90 tablet 1    atorvastatin (LIPITOR) 10 MG tablet TAKE 1 TABLET BY MOUTH EVERY DAY 90 tablet 1    cholecalciferol (VITAMIN D3) 1000 units tablet Take 1 tablet by mouth Daily. Indications: Vitamin D Deficiency      dorzolamide-timolol (COSOPT) 22.3-6.8 MG/ML ophthalmic solution INSTILL 1 DROP BY OPHTHALMIC ROUTE 2 TIMES EVERY DAY INTO BOTH EYES  6    Eliquis 5 MG tablet tablet TAKE 1 TABLET BY MOUTH 2 (TWO) TIMES A DAY. INDICATIONS: ATRIAL FIBRILLATION 180 tablet 1    latanoprost (XALATAN) 0.005 % ophthalmic solution INSTILL 1 DROP BY OPHTHALMIC ROUTE EVERY DAY BOTH EYES AT BEDTIME  6    metoprolol tartrate (LOPRESSOR) 25 MG tablet TAKE 0.5 TABLETS BY MOUTH EVERY 12 (TWELVE) HOURS. INDICATIONS: HIGH BLOOD PRESSURE DISORDER 90 tablet 1    valsartan (DIOVAN) 80 MG tablet TAKE 1 TABLET BY MOUTH EVERY DAY FOR HIGH BLOOD PRESSURE 90 tablet 1    vitamin C (ASCORBIC ACID) 500 MG tablet Take 1 tablet by mouth Daily. Indications: Inadequate Vitamin C       No current facility-administered medications on file prior to visit.        Allergies:  Allergies   Allergen Reactions    Penicillins Itching       Vital Signs:  /86   Pulse 59   Resp 16   SpO2 97%   Estimated body mass index is 27.04 kg/m² as calculated from the following:    Height as of 5/5/25: 166.4 cm (65.5\").    Weight as of 5/5/25: 74.8 kg (165 lb).  Pain Score    " 05/30/25 1303   PainSc: 0-No pain         ECOG: Capable of only limited selfcare; confined to bed or chair more than 50% of waking hours = 3    Physical Exam  Vitals reviewed.   Constitutional:       General: She is not in acute distress.     Appearance: Normal appearance.   HENT:      Head: Normocephalic and atraumatic.   Eyes:      Extraocular Movements: Extraocular movements intact.      Pupils: Pupils are equal, round, and reactive to light.   Pulmonary:      Effort: Pulmonary effort is normal.   Abdominal:      General: Abdomen is flat.      Palpations: Abdomen is soft.   Musculoskeletal:      Cervical back: Normal range of motion.   Skin:     General: Skin is warm and dry.   Neurological:      Mental Status: She is alert.   Psychiatric:         Mood and Affect: Mood normal.         Behavior: Behavior normal.          Result Review :  The following data was reviewed by: Benjie Valenzuela MD on 05/30/2025:  Labs: Last Creatinine   Data reviewed : Radiologic studies CT Chest            Diagnoses and all orders for this visit:    1. Primary cancer of right upper lobe of lung (Primary)  -     CT Chest Without Contrast; Future        Assessment:    Kamilla Hawthorne presents for regularly scheduled follow-up approximately 22 months after completion of radiation therapy for a right upper lobe lung cancer.  Late last year she had recurrence slightly inferior to her prior radiation field.  She received radiation to this lesion which completed in October 2024. The patient has been struggling with dementia, but no other new or concerning pulmonary complaints.  They have moved into assisted living.    I met with the patient and reviewed the results of her most recent CT chest in detail.  Overall, this is consistent with stability of her previously treated disease.  There is some expected postradiation change but this is not significantly worse.  There is some evidence of maturation of her radiation fibrosis, but no evidence  strong enough to suggest progression of her underlying malignancy.  She can follow-up with me in 4 months with repeat CT chest.      Plan:    -Follow-up in 4 months with repeat CT chest       I spent 30 minutes caring for Kamilla on this date of service. This time includes time spent by me in the following activities:preparing for the visit, reviewing tests, obtaining and/or reviewing a separately obtained history, documenting information in the medical record, independently interpreting results and communicating that information with the patient/family/caregiver, and care coordination  Follow Up   No follow-ups on file.  Patient was given instructions and counseling regarding her condition or for health maintenance advice. Please see specific information pulled into the AVS if appropriate.     Benjie Valenzuela MD

## 2025-06-02 RX ORDER — APIXABAN 5 MG/1
TABLET, FILM COATED ORAL
Qty: 180 TABLET | Refills: 1 | Status: SHIPPED | OUTPATIENT
Start: 2025-06-02

## 2025-06-30 RX ORDER — VALSARTAN 80 MG/1
80 TABLET ORAL DAILY
Qty: 90 TABLET | Refills: 1 | Status: SHIPPED | OUTPATIENT
Start: 2025-06-30

## 2025-06-30 RX ORDER — AMLODIPINE BESYLATE 2.5 MG/1
TABLET ORAL
Qty: 90 TABLET | Refills: 1 | Status: SHIPPED | OUTPATIENT
Start: 2025-06-30

## 2025-08-15 RX ORDER — CALCIUM CARBONATE 160(400)MG
1 TABLET,CHEWABLE ORAL DAILY
Qty: 1 EACH | Refills: 0 | Status: SHIPPED | OUTPATIENT
Start: 2025-08-15

## 2025-08-18 ENCOUNTER — TELEPHONE (OUTPATIENT)
Dept: INTERNAL MEDICINE | Facility: CLINIC | Age: 80
End: 2025-08-18
Payer: MEDICARE

## (undated) DEVICE — SWIVEL CONNECTOR

## (undated) DEVICE — LN SMPL CO2 SHTRM SD STREAM W/M LUER

## (undated) DEVICE — TRAP,MUCUS SPECIMEN, 80CC: Brand: MEDLINE

## (undated) DEVICE — CANN O2 ETCO2 FITS ALL CONN CO2 SMPL A/ 7IN DISP LF

## (undated) DEVICE — CANN NASL CO2 TRULINK W/O2 A/

## (undated) DEVICE — FRCP BIOP SUPER TRAX 1.7MM 110CM

## (undated) DEVICE — MSK AIRWY LARYNG LMA PILOT SZ4

## (undated) DEVICE — SINGLE-USE BIOPSY FORCEPS: Brand: RADIAL JAW 4

## (undated) DEVICE — BIOPSY NEEDLE, 21G: Brand: FLEXISION

## (undated) DEVICE — ADAPT CLN BIOGUARD AIR/H2O DISP

## (undated) DEVICE — THE TORRENT IRRIGATION SCOPE CONNECTOR IS USED WITH THE TORRENT IRRIGATION TUBING TO PROVIDE IRRIGATION FLUIDS SUCH AS STERILE WATER DURING GASTROINTESTINAL ENDOSCOPIC PROCEDURES WHEN USED IN CONJUNCTION WITH AN IRRIGATION PUMP (OR ELECTROSURGICAL UNIT).: Brand: TORRENT

## (undated) DEVICE — ADAPT SWVL FIBROPTIC BRONCH

## (undated) DEVICE — TUBING, SUCTION, 1/4" X 10', STRAIGHT: Brand: MEDLINE

## (undated) DEVICE — SINGLE USE BIOPSY VALVE MAJ-210: Brand: SINGLE USE BIOPSY VALVE (STERILE)

## (undated) DEVICE — SENSR O2 OXIMAX FNGR A/ 18IN NONSTR

## (undated) DEVICE — VISION PROBE ADAPTER AND SUCTION ADAPTER

## (undated) DEVICE — Device: Brand: DEFENDO AIR/WATER/SUCTION AND BIOPSY VALVE

## (undated) DEVICE — KT ORCA ORCAPOD DISP STRL

## (undated) DEVICE — SINGLE USE SUCTION VALVE MAJ-209: Brand: SINGLE USE SUCTION VALVE (STERILE)

## (undated) DEVICE — VITAL SIGNS™ JACKSON-REES CIRCUITS: Brand: VITAL SIGNS™